# Patient Record
Sex: MALE | Race: WHITE | NOT HISPANIC OR LATINO | Employment: STUDENT | URBAN - METROPOLITAN AREA
[De-identification: names, ages, dates, MRNs, and addresses within clinical notes are randomized per-mention and may not be internally consistent; named-entity substitution may affect disease eponyms.]

---

## 2018-02-02 ENCOUNTER — OFFICE VISIT (OUTPATIENT)
Dept: URGENT CARE | Facility: CLINIC | Age: 18
End: 2018-02-02
Payer: COMMERCIAL

## 2018-02-02 VITALS
DIASTOLIC BLOOD PRESSURE: 70 MMHG | RESPIRATION RATE: 18 BRPM | HEART RATE: 90 BPM | HEIGHT: 68 IN | WEIGHT: 140 LBS | SYSTOLIC BLOOD PRESSURE: 117 MMHG | OXYGEN SATURATION: 99 % | TEMPERATURE: 99 F | BODY MASS INDEX: 21.22 KG/M2

## 2018-02-02 DIAGNOSIS — R05.9 COUGH: ICD-10-CM

## 2018-02-02 DIAGNOSIS — R52 BODY ACHES: ICD-10-CM

## 2018-02-02 DIAGNOSIS — J10.1 INFLUENZA B: Primary | ICD-10-CM

## 2018-02-02 LAB
CTP QC/QA: YES
CTP QC/QA: YES
FLUAV AG NPH QL: NEGATIVE
FLUBV AG NPH QL: POSITIVE
S PYO RRNA THROAT QL PROBE: NEGATIVE

## 2018-02-02 PROCEDURE — 99203 OFFICE O/P NEW LOW 30 MIN: CPT | Mod: S$GLB,,, | Performed by: FAMILY MEDICINE

## 2018-02-02 PROCEDURE — 3008F BODY MASS INDEX DOCD: CPT | Mod: S$GLB,,, | Performed by: FAMILY MEDICINE

## 2018-02-02 PROCEDURE — 87804 INFLUENZA ASSAY W/OPTIC: CPT | Mod: QW,S$GLB,, | Performed by: FAMILY MEDICINE

## 2018-02-02 PROCEDURE — 87880 STREP A ASSAY W/OPTIC: CPT | Mod: QW,S$GLB,, | Performed by: FAMILY MEDICINE

## 2018-02-02 RX ORDER — CODEINE PHOSPHATE AND GUAIFENESIN 10; 100 MG/5ML; MG/5ML
5 SOLUTION ORAL 3 TIMES DAILY PRN
Qty: 120 ML | Refills: 0 | Status: SHIPPED | OUTPATIENT
Start: 2018-02-02 | End: 2018-02-09

## 2018-02-02 RX ORDER — IBUPROFEN 800 MG/1
800 TABLET ORAL 3 TIMES DAILY
Qty: 30 TABLET | Refills: 0 | Status: SHIPPED | OUTPATIENT
Start: 2018-02-02 | End: 2018-02-12

## 2018-02-02 RX ORDER — OSELTAMIVIR PHOSPHATE 75 MG/1
75 CAPSULE ORAL 2 TIMES DAILY
Qty: 10 CAPSULE | Refills: 0 | Status: SHIPPED | OUTPATIENT
Start: 2018-02-02 | End: 2018-02-07

## 2018-02-02 NOTE — PATIENT INSTRUCTIONS
Influenza (Adult)    Influenza is also called the flu. It is a viral illness that affects the air passages of your lungs. It is different from the common cold. The flu can easily be passed from one to person to another. It may be spread through the air by coughing and sneezing. Or it can be spread by touching the sick person and then touching your own eyes, nose, or mouth.  The flu starts 1 to 3 days after you are exposed to the flu virus. It may last for 1 to 2 weeks but many people feel tired or fatigued for many weeks afterward. You usually dont need to take antibiotics unless you have a complication. This might be an ear or sinus infection or pneumonia.  Symptoms of the flu may be mild or severe. They can include extreme tiredness (wanting to stay in bed all day), chills, fevers, muscle aches, soreness with eye movement, headache, and a dry, hacking cough.  Home care  Follow these guidelines when caring for yourself at home:  · Avoid being around cigarette smoke, whether yours or other peoples.  · Acetaminophen or ibuprofen will help ease your fever, muscle aches, and headache. Dont give aspirin to anyone younger than 18 who has the flu. Aspirin can harm the liver.  · Nausea and loss of appetite are common with the flu. Eat light meals. Drink 6 to 8 glasses of liquids every day. Good choices are water, sport drinks, soft drinks without caffeine, juices, tea, and soup. Extra fluids will also help loosen secretions in your nose and lungs.  · Over-the-counter cold medicines will not make the flu go away faster. But the medicines may help with coughing, sore throat, and congestion in your nose and sinuses. Dont use a decongestant if you have high blood pressure.  · Stay home until your fever has been gone for at least 24 hours without using medicine to reduce fever.  Follow-up care  Follow up with your healthcare provider, or as advised, if you are not getting better over the next week.  If you are age 65 or  older, talk with your provider about getting a pneumococcal vaccine every 5 years. You should also get this vaccine if you have chronic asthma or COPD. All adults should get a flu vaccine every fall. Ask your provider about this.  When to seek medical advice  Call your healthcare provider right away if any of these occur:  · Cough with lots of colored mucus (sputum) or blood in your mucus  · Chest pain, shortness of breath, wheezing, or trouble breathing  · Severe headache, or face, neck, or ear pain  · New rash with fever  · Fever of 100.4°F (38°C) or higher, or as directed by your healthcare provider  · Confusion, behavior change, or seizure  · Severe weakness or dizziness  · You get a new fever or cough after getting better for a few days  Date Last Reviewed: 1/1/2017  © 3349-5491 [employer]. [address] [place] [address], [place] [address]. All rights reserved. This information is not intended as a substitute for professional medical care. Always follow your healthcare professional's instructions.      Follow up with your doctor in a few days as needed.  Return to the urgent care or go to the ER if symptoms get worse.    Francis Mustafa MD

## 2018-02-02 NOTE — PROGRESS NOTES
"Subjective:       Patient ID: Fernie Rice is a 17 y.o. male.    Vitals:  height is 5' 8" (1.727 m) and weight is 63.5 kg (140 lb). His temperature is 98.9 °F (37.2 °C). His blood pressure is 117/70 and his pulse is 90. His respiration is 18 and oxygen saturation is 99%.     Chief Complaint: URI    Pt states symptoms headaches, chills, body aches and cough that have been going on for about a week      URI    This is a new problem. The current episode started in the past 7 days. The problem has been waxing and waning. Associated symptoms include congestion, coughing, ear pain, headaches, sinus pain and a sore throat. Pertinent negatives include no abdominal pain, chest pain, nausea or wheezing.     Review of Systems   Constitution: Positive for chills and fever. Negative for malaise/fatigue.   HENT: Positive for congestion, ear pain, sinus pain and sore throat. Negative for hoarse voice.    Eyes: Negative for discharge and redness.   Cardiovascular: Negative for chest pain, dyspnea on exertion and leg swelling.   Respiratory: Positive for cough and sputum production. Negative for shortness of breath and wheezing.    Musculoskeletal: Negative for myalgias.   Gastrointestinal: Negative for abdominal pain and nausea.   Neurological: Positive for headaches.       Objective:      Physical Exam   Constitutional: He is oriented to person, place, and time. He appears well-developed and well-nourished.   HENT:   Head: Normocephalic and atraumatic.   Right Ear: External ear normal.   Left Ear: External ear normal.   Erythematous pharynx, no exudate, no lesion, uvula midline.   Eyes: EOM are normal. Pupils are equal, round, and reactive to light.   Neck: Normal range of motion. Neck supple. No JVD present. No tracheal deviation present. No thyromegaly present.   Cardiovascular: Normal rate, regular rhythm and normal heart sounds.  Exam reveals no gallop and no friction rub.    No murmur heard.  Pulmonary/Chest: Breath sounds " normal. No respiratory distress. He has no wheezes. He has no rales. He exhibits no tenderness.   Abdominal: Soft. Bowel sounds are normal. He exhibits no distension and no mass. There is no tenderness. There is no rebound and no guarding. No hernia.   Musculoskeletal: Normal range of motion. He exhibits no edema, tenderness or deformity.   Lymphadenopathy:     He has no cervical adenopathy.   Neurological: He is alert and oriented to person, place, and time. He displays normal reflexes. No cranial nerve deficit. He exhibits normal muscle tone. Coordination normal.   Skin: Capillary refill takes less than 2 seconds.   Psychiatric: He has a normal mood and affect. His behavior is normal. Judgment and thought content normal.   Vitals reviewed.      Assessment:       1. Influenza B    2. Body aches    3. Cough        Plan:         Influenza B  -     oseltamivir (TAMIFLU) 75 MG capsule; Take 1 capsule (75 mg total) by mouth 2 (two) times daily.  Dispense: 10 capsule; Refill: 0    Body aches  -     POCT Influenza A/B  -     POCT rapid strep A  -     ibuprofen (ADVIL,MOTRIN) 800 MG tablet; Take 1 tablet (800 mg total) by mouth 3 (three) times daily.  Dispense: 30 tablet; Refill: 0    Cough  -     guaifenesin-codeine 100-10 mg/5 ml (CHERATUSSIN AC)  mg/5 mL syrup; Take 5 mLs by mouth 3 (three) times daily as needed for Cough.  Dispense: 120 mL; Refill: 0      Follow up with your doctor in a few days as needed.  Return to the urgent care or go to the ER if symptoms get worse.    Francis Mustafa MD

## 2018-02-16 ENCOUNTER — OFFICE VISIT (OUTPATIENT)
Dept: URGENT CARE | Facility: CLINIC | Age: 18
End: 2018-02-16
Payer: COMMERCIAL

## 2018-02-16 VITALS
RESPIRATION RATE: 18 BRPM | WEIGHT: 140 LBS | HEIGHT: 68 IN | DIASTOLIC BLOOD PRESSURE: 87 MMHG | HEART RATE: 97 BPM | BODY MASS INDEX: 21.22 KG/M2 | OXYGEN SATURATION: 99 % | SYSTOLIC BLOOD PRESSURE: 120 MMHG | TEMPERATURE: 101 F

## 2018-02-16 DIAGNOSIS — J98.01 BRONCHOSPASM: ICD-10-CM

## 2018-02-16 DIAGNOSIS — R50.9 FEVER, UNSPECIFIED FEVER CAUSE: ICD-10-CM

## 2018-02-16 DIAGNOSIS — J11.1 FLU SYNDROME: Primary | ICD-10-CM

## 2018-02-16 DIAGNOSIS — B30.9 VIRAL CONJUNCTIVITIS OF LEFT EYE: ICD-10-CM

## 2018-02-16 PROCEDURE — 99214 OFFICE O/P EST MOD 30 MIN: CPT | Mod: S$GLB,,, | Performed by: PHYSICIAN ASSISTANT

## 2018-02-16 PROCEDURE — 94640 AIRWAY INHALATION TREATMENT: CPT | Mod: S$GLB,,, | Performed by: FAMILY MEDICINE

## 2018-02-16 PROCEDURE — 3008F BODY MASS INDEX DOCD: CPT | Mod: S$GLB,,, | Performed by: PHYSICIAN ASSISTANT

## 2018-02-16 RX ORDER — OLOPATADINE HYDROCHLORIDE 2 MG/ML
1 SOLUTION/ DROPS OPHTHALMIC DAILY
Qty: 1 BOTTLE | Refills: 0 | Status: SHIPPED | OUTPATIENT
Start: 2018-02-16 | End: 2019-08-15

## 2018-02-16 RX ORDER — IPRATROPIUM BROMIDE 0.5 MG/2.5ML
0.5 SOLUTION RESPIRATORY (INHALATION)
Status: COMPLETED | OUTPATIENT
Start: 2018-02-16 | End: 2018-02-16

## 2018-02-16 RX ORDER — PROMETHAZINE HYDROCHLORIDE AND DEXTROMETHORPHAN HYDROBROMIDE 6.25; 15 MG/5ML; MG/5ML
5 SYRUP ORAL
Qty: 118 ML | Refills: 0 | Status: SHIPPED | OUTPATIENT
Start: 2018-02-16 | End: 2018-02-23

## 2018-02-16 RX ORDER — LEVALBUTEROL INHALATION SOLUTION 1.25 MG/3ML
1.25 SOLUTION RESPIRATORY (INHALATION)
Status: COMPLETED | OUTPATIENT
Start: 2018-02-16 | End: 2018-02-16

## 2018-02-16 RX ORDER — IBUPROFEN 200 MG
600 TABLET ORAL
Status: COMPLETED | OUTPATIENT
Start: 2018-02-16 | End: 2018-02-16

## 2018-02-16 RX ORDER — PREDNISONE 20 MG/1
20 TABLET ORAL DAILY
Qty: 3 TABLET | Refills: 0 | Status: SHIPPED | OUTPATIENT
Start: 2018-02-16 | End: 2018-02-19

## 2018-02-16 RX ORDER — OSELTAMIVIR PHOSPHATE 75 MG/1
75 CAPSULE ORAL 2 TIMES DAILY
Qty: 10 CAPSULE | Refills: 0 | Status: SHIPPED | OUTPATIENT
Start: 2018-02-16 | End: 2018-02-21

## 2018-02-16 RX ADMIN — LEVALBUTEROL INHALATION SOLUTION 1.25 MG: 1.25 SOLUTION RESPIRATORY (INHALATION) at 11:02

## 2018-02-16 RX ADMIN — Medication 600 MG: at 11:02

## 2018-02-16 RX ADMIN — IPRATROPIUM BROMIDE 0.5 MG: 0.5 SOLUTION RESPIRATORY (INHALATION) at 11:02

## 2018-02-16 NOTE — PATIENT INSTRUCTIONS
Please return here or go to the Emergency Department for any concerns or worsening of condition.  If you were prescribed antibiotics, please take them to completion.  If you were prescribed a narcotic medication, do not drive or operate heavy equipment or machinery while taking these medications.  Please follow up with your primary care doctor or specialist as needed.    If you  smoke, please stop smoking.    Symptomatic treatment:    Tylenol every 4 hours  Ibuprofen every 6 hours  salt water gargles  Cold-eeze helps to reduce the duration of sore throat symptoms  Cepachol helps to numb the discomfort  Chloroseptic spray  Nasal saline spray reduces inflammation and dryness  Warm face compresses as often as you can  Vicks vapor rub at night  Flonase OTC or Nasacort OTC  Simple foods like chicken noodle soup help  Mucinex DM or use Coricidin HBP if you have hypertension  Zyrtec/Claritin/Xyzal during the day and Benadryl at night may help if allergy component   Pedialyte helps with dehydration if lacking appetite  Rest as much as you can      Influenza (Adult)    Influenza is also called the flu. It is a viral illness that affects the air passages of your lungs. It is different from the common cold. The flu can easily be passed from one to person to another. It may be spread through the air by coughing and sneezing. Or it can be spread by touching the sick person and then touching your own eyes, nose, or mouth.  The flu starts 1 to 3 days after you are exposed to the flu virus. It may last for 1 to 2 weeks but many people feel tired or fatigued for many weeks afterward. You usually dont need to take antibiotics unless you have a complication. This might be an ear or sinus infection or pneumonia.  Symptoms of the flu may be mild or severe. They can include extreme tiredness (wanting to stay in bed all day), chills, fevers, muscle aches, soreness with eye movement, headache, and a dry, hacking cough.  Home care  Follow  these guidelines when caring for yourself at home:  · Avoid being around cigarette smoke, whether yours or other peoples.  · Acetaminophen or ibuprofen will help ease your fever, muscle aches, and headache. Dont give aspirin to anyone younger than 18 who has the flu. Aspirin can harm the liver.  · Nausea and loss of appetite are common with the flu. Eat light meals. Drink 6 to 8 glasses of liquids every day. Good choices are water, sport drinks, soft drinks without caffeine, juices, tea, and soup. Extra fluids will also help loosen secretions in your nose and lungs.  · Over-the-counter cold medicines will not make the flu go away faster. But the medicines may help with coughing, sore throat, and congestion in your nose and sinuses. Dont use a decongestant if you have high blood pressure.  · Stay home until your fever has been gone for at least 24 hours without using medicine to reduce fever.  Follow-up care  Follow up with your healthcare provider, or as advised, if you are not getting better over the next week.  If you are age 65 or older, talk with your provider about getting a pneumococcal vaccine every 5 years. You should also get this vaccine if you have chronic asthma or COPD. All adults should get a flu vaccine every fall. Ask your provider about this.  When to seek medical advice  Call your healthcare provider right away if any of these occur:  · Cough with lots of colored mucus (sputum) or blood in your mucus  · Chest pain, shortness of breath, wheezing, or trouble breathing  · Severe headache, or face, neck, or ear pain  · New rash with fever  · Fever of 100.4°F (38°C) or higher, or as directed by your healthcare provider  · Confusion, behavior change, or seizure  · Severe weakness or dizziness  · You get a new fever or cough after getting better for a few days  Date Last Reviewed: 1/1/2017  © 8692-3978 [email or web address]. [address] [place] [address], [place] [address]. All rights reserved. This  information is not intended as a substitute for professional medical care. Always follow your healthcare professional's instructions.

## 2018-02-16 NOTE — PROGRESS NOTES
"Subjective:       Patient ID: Fernie Rice is a 17 y.o. male.    Vitals:  height is 5' 8" (1.727 m) and weight is 63.5 kg (140 lb). His oral temperature is 100.7 °F (38.2 °C) (abnormal). His blood pressure is 120/87 and his pulse is 97. His respiration is 18 and oxygen saturation is 99%.     Chief Complaint: Cough    Cough   This is a new problem. The current episode started in the past 7 days. The problem has been gradually improving. The problem occurs every few minutes. The cough is non-productive. Associated symptoms include a fever, myalgias, rhinorrhea, sweats and wheezing. Pertinent negatives include no chest pain, chills, ear congestion, ear pain, eye redness, headaches, heartburn, hemoptysis, nasal congestion, postnasal drip, rash, sore throat, shortness of breath or weight loss. The symptoms are aggravated by lying down. He has tried nothing for the symptoms. The treatment provided no relief. His past medical history is significant for asthma. There is no history of bronchiectasis, bronchitis, COPD, emphysema, environmental allergies or pneumonia.     Review of Systems   Constitution: Positive for fever and malaise/fatigue. Negative for chills and weight loss.   HENT: Positive for congestion and rhinorrhea. Negative for ear pain, hoarse voice, postnasal drip and sore throat.    Eyes: Negative for discharge and redness.   Cardiovascular: Negative for chest pain, dyspnea on exertion and leg swelling.   Respiratory: Positive for cough and wheezing. Negative for hemoptysis, shortness of breath and sputum production.    Skin: Negative for rash.   Musculoskeletal: Positive for myalgias. Negative for muscle cramps.   Gastrointestinal: Negative for abdominal pain, heartburn and nausea.   Neurological: Negative for headaches.   Allergic/Immunologic: Negative for environmental allergies.       Objective:      Physical Exam   Constitutional: He is oriented to person, place, and time. He appears well-developed and " well-nourished. He appears listless. He is cooperative.  Non-toxic appearance. He appears ill. No distress.   HENT:   Head: Normocephalic and atraumatic.   Right Ear: Hearing, external ear and ear canal normal. Tympanic membrane is not erythematous and not bulging.   Left Ear: Hearing, external ear and ear canal normal. Tympanic membrane is not erythematous and not bulging.   Nose: Mucosal edema (mild) and rhinorrhea present. No nasal deformity. No epistaxis. Right sinus exhibits no maxillary sinus tenderness and no frontal sinus tenderness. Left sinus exhibits no maxillary sinus tenderness and no frontal sinus tenderness.   Mouth/Throat: Uvula is midline and mucous membranes are normal. No trismus in the jaw. Normal dentition. No uvula swelling. Posterior oropharyngeal erythema present. No oropharyngeal exudate or posterior oropharyngeal edema. Tonsils are 1+ on the right. Tonsils are 1+ on the left. No tonsillar exudate.   Dull TM bilaterally   Eyes: Conjunctivae and lids are normal. No scleral icterus.   Sclera clear bilat   Neck: Trachea normal, full passive range of motion without pain and phonation normal. Neck supple.   Cardiovascular: Normal rate, regular rhythm, normal heart sounds, intact distal pulses and normal pulses.    Pulmonary/Chest: Effort normal. No respiratory distress. He has no decreased breath sounds. He has wheezes (no wheezes post breathing treatment). He has no rhonchi. He has no rales.   Dry cough on exam   Abdominal: Soft. Normal appearance and bowel sounds are normal. He exhibits no distension. There is no tenderness.   Musculoskeletal: Normal range of motion. He exhibits no edema or deformity.   Neurological: He is oriented to person, place, and time. He appears listless. He exhibits normal muscle tone. Coordination normal.   Skin: Skin is warm, dry and intact. He is not diaphoretic. No pallor.   Psychiatric: He has a normal mood and affect. His speech is normal and behavior is normal.  Judgment and thought content normal. Cognition and memory are normal.   Nursing note and vitals reviewed.      Assessment:       1. Flu syndrome    2. Bronchospasm    3. Viral conjunctivitis of left eye    4. Fever, unspecified fever cause        Plan:         Flu syndrome  -     oseltamivir (TAMIFLU) 75 MG capsule; Take 1 capsule (75 mg total) by mouth 2 (two) times daily.  Dispense: 10 capsule; Refill: 0  -     promethazine-dextromethorphan (PROMETHAZINE-DM) 6.25-15 mg/5 mL Syrp; Take 5 mLs by mouth every 4 to 6 hours as needed.  Dispense: 118 mL; Refill: 0  -     predniSONE (DELTASONE) 20 MG tablet; Take 1 tablet (20 mg total) by mouth once daily.  Dispense: 3 tablet; Refill: 0    Bronchospasm  -     levalbuterol nebulizer solution 1.25 mg; Take 3 mLs (1.25 mg total) by nebulization one time.  -     ipratropium 0.02 % nebulizer solution 0.5 mg; Take 2.5 mLs (0.5 mg total) by nebulization one time.  -     predniSONE (DELTASONE) 20 MG tablet; Take 1 tablet (20 mg total) by mouth once daily.  Dispense: 3 tablet; Refill: 0    Viral conjunctivitis of left eye  -     olopatadine (PATADAY) 0.2 % Drop; Place 1 drop into both eyes once daily.  Dispense: 1 Bottle; Refill: 0    Fever, unspecified fever cause  -     ibuprofen tablet 600 mg; Take 3 tablets (600 mg total) by mouth one time.      Patient Instructions   Please return here or go to the Emergency Department for any concerns or worsening of condition.  If you were prescribed antibiotics, please take them to completion.  If you were prescribed a narcotic medication, do not drive or operate heavy equipment or machinery while taking these medications.  Please follow up with your primary care doctor or specialist as needed.    If you  smoke, please stop smoking.    Symptomatic treatment:    Tylenol every 4 hours  Ibuprofen every 6 hours  salt water gargles  Cold-eeze helps to reduce the duration of sore throat symptoms  Cepachol helps to numb the  discomfort  Chloroseptic spray  Nasal saline spray reduces inflammation and dryness  Warm face compresses as often as you can  Vicks vapor rub at night  Flonase OTC or Nasacort OTC  Simple foods like chicken noodle soup help  Mucinex DM or use Coricidin HBP if you have hypertension  Zyrtec/Claritin/Xyzal during the day and Benadryl at night may help if allergy component   Pedialyte helps with dehydration if lacking appetite  Rest as much as you can      Influenza (Adult)    Influenza is also called the flu. It is a viral illness that affects the air passages of your lungs. It is different from the common cold. The flu can easily be passed from one to person to another. It may be spread through the air by coughing and sneezing. Or it can be spread by touching the sick person and then touching your own eyes, nose, or mouth.  The flu starts 1 to 3 days after you are exposed to the flu virus. It may last for 1 to 2 weeks but many people feel tired or fatigued for many weeks afterward. You usually dont need to take antibiotics unless you have a complication. This might be an ear or sinus infection or pneumonia.  Symptoms of the flu may be mild or severe. They can include extreme tiredness (wanting to stay in bed all day), chills, fevers, muscle aches, soreness with eye movement, headache, and a dry, hacking cough.  Home care  Follow these guidelines when caring for yourself at home:  · Avoid being around cigarette smoke, whether yours or other peoples.  · Acetaminophen or ibuprofen will help ease your fever, muscle aches, and headache. Dont give aspirin to anyone younger than 18 who has the flu. Aspirin can harm the liver.  · Nausea and loss of appetite are common with the flu. Eat light meals. Drink 6 to 8 glasses of liquids every day. Good choices are water, sport drinks, soft drinks without caffeine, juices, tea, and soup. Extra fluids will also help loosen secretions in your nose and lungs.  · Over-the-counter  cold medicines will not make the flu go away faster. But the medicines may help with coughing, sore throat, and congestion in your nose and sinuses. Dont use a decongestant if you have high blood pressure.  · Stay home until your fever has been gone for at least 24 hours without using medicine to reduce fever.  Follow-up care  Follow up with your healthcare provider, or as advised, if you are not getting better over the next week.  If you are age 65 or older, talk with your provider about getting a pneumococcal vaccine every 5 years. You should also get this vaccine if you have chronic asthma or COPD. All adults should get a flu vaccine every fall. Ask your provider about this.  When to seek medical advice  Call your healthcare provider right away if any of these occur:  · Cough with lots of colored mucus (sputum) or blood in your mucus  · Chest pain, shortness of breath, wheezing, or trouble breathing  · Severe headache, or face, neck, or ear pain  · New rash with fever  · Fever of 100.4°F (38°C) or higher, or as directed by your healthcare provider  · Confusion, behavior change, or seizure  · Severe weakness or dizziness  · You get a new fever or cough after getting better for a few days  Date Last Reviewed: 1/1/2017  © 5254-7746 [employer]. [address] [place] [address], [place] [address]. All rights reserved. This information is not intended as a substitute for professional medical care. Always follow your healthcare professional's instructions.

## 2018-05-21 ENCOUNTER — OFFICE VISIT (OUTPATIENT)
Dept: URGENT CARE | Facility: CLINIC | Age: 18
End: 2018-05-21
Payer: COMMERCIAL

## 2018-05-21 VITALS
HEIGHT: 68 IN | DIASTOLIC BLOOD PRESSURE: 69 MMHG | WEIGHT: 148 LBS | TEMPERATURE: 97 F | HEART RATE: 91 BPM | OXYGEN SATURATION: 96 % | BODY MASS INDEX: 22.43 KG/M2 | RESPIRATION RATE: 18 BRPM | SYSTOLIC BLOOD PRESSURE: 126 MMHG

## 2018-05-21 DIAGNOSIS — Z87.09 HISTORY OF ASTHMA: ICD-10-CM

## 2018-05-21 DIAGNOSIS — J02.9 PHARYNGITIS, UNSPECIFIED ETIOLOGY: Primary | ICD-10-CM

## 2018-05-21 DIAGNOSIS — J02.9 SORE THROAT: ICD-10-CM

## 2018-05-21 DIAGNOSIS — R05.9 COUGH: ICD-10-CM

## 2018-05-21 LAB
CTP QC/QA: YES
S PYO RRNA THROAT QL PROBE: NEGATIVE

## 2018-05-21 PROCEDURE — 87880 STREP A ASSAY W/OPTIC: CPT | Mod: QW,S$GLB,, | Performed by: FAMILY MEDICINE

## 2018-05-21 PROCEDURE — 3008F BODY MASS INDEX DOCD: CPT | Mod: CPTII,S$GLB,, | Performed by: FAMILY MEDICINE

## 2018-05-21 PROCEDURE — 96372 THER/PROPH/DIAG INJ SC/IM: CPT | Mod: S$GLB,,, | Performed by: FAMILY MEDICINE

## 2018-05-21 PROCEDURE — 99214 OFFICE O/P EST MOD 30 MIN: CPT | Mod: 25,S$GLB,, | Performed by: FAMILY MEDICINE

## 2018-05-21 RX ORDER — AZITHROMYCIN 250 MG/1
TABLET, FILM COATED ORAL
Qty: 6 TABLET | Refills: 0 | Status: SHIPPED | OUTPATIENT
Start: 2018-05-21 | End: 2019-08-14

## 2018-05-21 RX ORDER — BETAMETHASONE SODIUM PHOSPHATE AND BETAMETHASONE ACETATE 3; 3 MG/ML; MG/ML
9 INJECTION, SUSPENSION INTRA-ARTICULAR; INTRALESIONAL; INTRAMUSCULAR; SOFT TISSUE
Status: COMPLETED | OUTPATIENT
Start: 2018-05-21 | End: 2018-05-21

## 2018-05-21 RX ORDER — ALBUTEROL SULFATE 90 UG/1
2 AEROSOL, METERED RESPIRATORY (INHALATION) EVERY 4 HOURS PRN
Qty: 1 INHALER | Refills: 0 | Status: SHIPPED | OUTPATIENT
Start: 2018-05-21 | End: 2018-06-20

## 2018-05-21 RX ADMIN — BETAMETHASONE SODIUM PHOSPHATE AND BETAMETHASONE ACETATE 9 MG: 3; 3 INJECTION, SUSPENSION INTRA-ARTICULAR; INTRALESIONAL; INTRAMUSCULAR; SOFT TISSUE at 12:05

## 2018-05-21 NOTE — PATIENT INSTRUCTIONS
When You Have a Sore Throat    A sore throat can be painful. There are many reasons why you may have a sore throat. Your healthcare provider will work with you to find the cause of your sore throat. He or she will also find the best treatment for you.  What causes a sore throat?  Sore throats can be caused or worsened by:  · Cold or flu viruses  · Bacteria  · Irritants such as tobacco smoke or air pollution  · Acid reflux  A healthy throat  The tonsils are on the sides of the throat near the base of the tongue. They collect viruses and bacteria and help fight infection. The throat (pharynx) is the passage for air. Mucus from the nasal cavity also moves down the passage.  An inflamed throat  The tonsils and pharynx can become inflamed due to a cold or flu virus. Postnasal drip (excess mucus draining from the nasal cavity) can irritate the throat. It can also make the throat or tonsils more likely to be infected by bacteria. Severe, untreated tonsillitis in children or adults can cause a pocket of pus (abscess) to form near the tonsil.  Your evaluation  A medical evaluation can help find the cause of your sore throat. It can also help your healthcare provider choose the best treatment for you. The evaluation may include a health history, physical exam, and diagnostic tests.  Health history  Your healthcare provider may ask you the following:  · How long has the sore throat lasted and how have you been treating it?  · Do you have any other symptoms, such as body aches, fever, or cough?  · Does your sore throat recur? If so, how often? How many days of school or work have you missed because of a sore throat?  · Do you have trouble eating or swallowing?  · Have you been told that you snore or have other sleep problems?  · Do you have bad breath?  · Do you cough up bad-tasting mucus?  Physical exam  During the exam, your healthcare provider checks your ears, nose, and throat for problems. He or she also checks for  "swelling in the neck, and may listen to your chest.  Possible tests  Other tests your healthcare provider may perform include:  · A throat swab to check for bacteria such as streptococcus (the bacteria that causes strep throat)  · A blood test to check for mononucleosis (a viral infection)  · A chest X-ray to rule out pneumonia, especially if you have a cough  Treating a sore throat  Treatment depends on many factors. What is the likely cause? Is the problem recent? Does it keep coming back? In many cases, the best thing to do is to treat the symptoms, rest, and let the problem heal itself. Antibiotics may help clear up some bacterial infections. For cases of severe or recurring tonsillitis, the tonsils may need to be removed.  Relieving your symptoms  · Dont smoke, and avoid secondhand smoke.  · For children, try throat sprays or Popsicles. Adults and older children may try lozenges.  · Drink warm liquids to soothe the throat and help thin mucus. Avoid alcohol, spicy foods, and acidic drinks such as orange juice. These can irritate the throat.  · Gargle with warm saltwater (1 teaspoon of salt to 8 ounces of warm water).  · Use a humidifier to keep air moist and relieve throat dryness.  · Try over-the-counter pain relievers such as acetaminophen or ibuprofen. Use as directed, and dont exceed the recommended dose. Dont give aspirin to children.   Are antibiotics needed?  If your sore throat is due to a bacterial infection, antibiotics may speed healing and prevent complications. Although group A streptococcus ("strep throat" or GAS) is the major treatable infection for a sore throat, GAS causes only 5% to 15% of sore throats in adults who seek medical care. Most sore throats are caused by cold or flu viruses. And antibiotics dont treat viral illness. In fact, using antibiotics when theyre not needed may produce bacteria that are harder to kill. Your healthcare provider will prescribe antibiotics only if he or " she thinks they are likely to help.  If antibiotics are prescribed  Take the medicine exactly as directed. Be sure to finish your prescription even if youre feeling better. And be sure to ask your healthcare provider or pharmacist what side effects are common and what to do about them.  Is surgery needed?  In some cases, tonsils need to be removed. This is often done as outpatient (same-day) surgery. Your healthcare provider may advise removing the tonsils in cases of:  · Several severe bouts of tonsillitis in a year. Severe episodes include those that lead to missed days of school or work, or that need to be treated with antibiotics.  · Tonsillitis that causes breathing problems during sleep  · Tonsillitis caused by food particles collecting in pouches in the tonsils (cryptic tonsillitis)  Call your healthcare provider if any of the following occur:  · Symptoms worsen, or new symptoms develop.  · Swollen tonsils make breathing difficult.  · The pain is severe enough to keep you from drinking liquids.  · A skin rash, hives, or wheezing develops. Any of these could signal an allergic reaction to antibiotics.  · Symptoms dont improve within a week.  · Symptoms dont improve within 2 to 3 days of starting antibiotics.   Date Last Reviewed: 10/1/2016  © 4136-0739 [employer]. [address] [place] [address], [place] [address]. All rights reserved. This information is not intended as a substitute for professional medical care. Always follow your healthcare professional's instructions.      Follow up with your doctor in a few days as needed.  Return to the urgent care or go to the ER if symptoms get worse.    Francis Mustafa MD

## 2018-05-21 NOTE — LETTER
May 21, 2018      [facility] [place]  [address]  [place] [address]  Phone: [phone]  Fax: [phone]       Patient: Fernie Rice   YOB: 2000  Date of Visit: 05/21/2018    To Whom It May Concern:    Luis Rice  was at [facility] on 05/21/2018. He may return to work/school on 5/22/18 with no restrictions.   He can use albuterol inhaler every 4 hours as needed for difficulty of breathing.  If you have any questions or concerns, or if I can be of further assistance, please do not hesitate to contact me.    Sincerely,    Francis Mustafa MD

## 2018-05-21 NOTE — PROGRESS NOTES
"Subjective:       Patient ID: Fernie Rice is a 17 y.o. male.    Vitals:  height is 5' 8" (1.727 m) and weight is 67.1 kg (148 lb). His temperature is 97 °F (36.1 °C). His blood pressure is 126/69 and his pulse is 91. His respiration is 18 and oxygen saturation is 96%.     Chief Complaint: URI    Pt states cough, throat discomfort, sweating, reports a fever and has been using his nebulizer more ferqutnly       URI    This is a new problem. The current episode started in the past 7 days. The problem has been gradually worsening. Associated symptoms include coughing, sinus pain and a sore throat. Pertinent negatives include no abdominal pain, chest pain, congestion, ear pain, headaches, nausea or wheezing.     Review of Systems   Constitution: Negative for chills, fever and malaise/fatigue.   HENT: Positive for sinus pain and sore throat. Negative for congestion, ear pain and hoarse voice.    Eyes: Negative for discharge and redness.   Cardiovascular: Negative for chest pain, dyspnea on exertion and leg swelling.   Respiratory: Positive for cough. Negative for shortness of breath, sputum production and wheezing.    Musculoskeletal: Negative for myalgias.   Gastrointestinal: Negative for abdominal pain and nausea.   Neurological: Negative for headaches.       Objective:      Physical Exam   Constitutional: He is oriented to person, place, and time. He appears well-developed and well-nourished.   HENT:   Head: Normocephalic and atraumatic.   Right Ear: External ear normal.   Left Ear: External ear normal.   Erythematous pharynx, no lesion, no exudate, uvula midline.   Eyes: EOM are normal. Pupils are equal, round, and reactive to light.   Neck: Normal range of motion. Neck supple. No JVD present. No tracheal deviation present. No thyromegaly present.   Cardiovascular: Normal rate, regular rhythm and normal heart sounds.  Exam reveals no gallop and no friction rub.    No murmur heard.  Pulmonary/Chest: Breath sounds normal. " No respiratory distress. He has no wheezes. He has no rales. He exhibits no tenderness.   Abdominal: Soft. Bowel sounds are normal. He exhibits no distension and no mass. There is no tenderness. There is no rebound and no guarding. No hernia.   Musculoskeletal: Normal range of motion. He exhibits no edema, tenderness or deformity.   Lymphadenopathy:     He has no cervical adenopathy.   Neurological: He is alert and oriented to person, place, and time. He displays normal reflexes. No cranial nerve deficit. He exhibits normal muscle tone. Coordination normal.   Skin: Skin is warm. Capillary refill takes less than 2 seconds. No rash noted. No erythema. No pallor.   Psychiatric: He has a normal mood and affect. His behavior is normal. Judgment and thought content normal.   Vitals reviewed.      Assessment:       1. Pharyngitis, unspecified etiology    2. Sore throat    3. History of asthma        Plan:         Pharyngitis, unspecified etiology  -     betamethasone acetate-betamethasone sodium phosphate injection 9 mg; Inject 1.5 mLs (9 mg total) into the muscle one time.  -     azithromycin (Z-ANANT) 250 MG tablet; Take 2 tablets by mouth x 1 for day 1 Then take 1 tablet by mouth daily for day 2 - 5  Dispense: 6 tablet; Refill: 0    Sore throat  -     POCT rapid strep A    History of asthma  -     albuterol 90 mcg/actuation inhaler; Inhale 2 puffs into the lungs every 4 (four) hours as needed for Wheezing. Rescue  Dispense: 1 Inhaler; Refill: 0      Follow up with your doctor in a few days as needed.  Return to the urgent care or go to the ER if symptoms get worse.    Francis Mustafa MD

## 2018-05-22 RX ORDER — PROMETHAZINE HYDROCHLORIDE AND DEXTROMETHORPHAN HYDROBROMIDE 6.25; 15 MG/5ML; MG/5ML
5 SYRUP ORAL 4 TIMES DAILY PRN
Qty: 140 ML | Refills: 0 | Status: SHIPPED | OUTPATIENT
Start: 2018-05-22 | End: 2018-05-29

## 2018-05-22 RX ORDER — ALBUTEROL SULFATE 0.83 MG/ML
2.5 SOLUTION RESPIRATORY (INHALATION) EVERY 6 HOURS PRN
Qty: 1 BOX | Refills: 0 | Status: SHIPPED | OUTPATIENT
Start: 2018-05-22 | End: 2018-06-21

## 2018-05-22 RX ORDER — METHYLPREDNISOLONE 4 MG/1
TABLET ORAL
Qty: 1 PACKAGE | Refills: 0 | Status: SHIPPED | OUTPATIENT
Start: 2018-05-22 | End: 2018-05-28

## 2019-08-14 ENCOUNTER — OFFICE VISIT (OUTPATIENT)
Dept: URGENT CARE | Facility: CLINIC | Age: 19
End: 2019-08-14
Payer: COMMERCIAL

## 2019-08-14 VITALS
HEIGHT: 68 IN | WEIGHT: 150 LBS | HEART RATE: 100 BPM | SYSTOLIC BLOOD PRESSURE: 121 MMHG | TEMPERATURE: 99 F | BODY MASS INDEX: 22.73 KG/M2 | OXYGEN SATURATION: 96 % | DIASTOLIC BLOOD PRESSURE: 76 MMHG | RESPIRATION RATE: 19 BRPM

## 2019-08-14 DIAGNOSIS — R68.89 FLU-LIKE SYMPTOMS: ICD-10-CM

## 2019-08-14 DIAGNOSIS — R11.2 NAUSEA AND VOMITING, INTRACTABILITY OF VOMITING NOT SPECIFIED, UNSPECIFIED VOMITING TYPE: Primary | ICD-10-CM

## 2019-08-14 DIAGNOSIS — R52 GENERALIZED BODY ACHES: ICD-10-CM

## 2019-08-14 LAB
CTP QC/QA: YES
FLUAV AG NPH QL: NEGATIVE
FLUBV AG NPH QL: NEGATIVE

## 2019-08-14 PROCEDURE — 3008F BODY MASS INDEX DOCD: CPT | Mod: CPTII,S$GLB,, | Performed by: NURSE PRACTITIONER

## 2019-08-14 PROCEDURE — 99214 OFFICE O/P EST MOD 30 MIN: CPT | Mod: S$GLB,,, | Performed by: NURSE PRACTITIONER

## 2019-08-14 PROCEDURE — 87804 POCT INFLUENZA A/B: ICD-10-PCS | Mod: 59,QW,S$GLB, | Performed by: NURSE PRACTITIONER

## 2019-08-14 PROCEDURE — 87804 INFLUENZA ASSAY W/OPTIC: CPT | Mod: QW,S$GLB,, | Performed by: NURSE PRACTITIONER

## 2019-08-14 PROCEDURE — 99214 PR OFFICE/OUTPT VISIT, EST, LEVL IV, 30-39 MIN: ICD-10-PCS | Mod: S$GLB,,, | Performed by: NURSE PRACTITIONER

## 2019-08-14 PROCEDURE — 3008F PR BODY MASS INDEX (BMI) DOCUMENTED: ICD-10-PCS | Mod: CPTII,S$GLB,, | Performed by: NURSE PRACTITIONER

## 2019-08-14 RX ORDER — ONDANSETRON 4 MG/1
4 TABLET, FILM COATED ORAL EVERY 6 HOURS PRN
Qty: 30 TABLET | Refills: 0 | Status: SHIPPED | OUTPATIENT
Start: 2019-08-14 | End: 2019-08-15

## 2019-08-14 RX ORDER — DEXTROAMPHETAMINE SACCHARATE, AMPHETAMINE ASPARTATE, DEXTROAMPHETAMINE SULFATE AND AMPHETAMINE SULFATE 5; 5; 5; 5 MG/1; MG/1; MG/1; MG/1
TABLET ORAL
Refills: 0 | COMMUNITY
Start: 2019-06-11 | End: 2019-12-31

## 2019-08-14 RX ORDER — VILAZODONE HYDROCHLORIDE 40 MG/1
TABLET ORAL
Refills: 3 | COMMUNITY
Start: 2019-06-20 | End: 2019-12-31

## 2019-08-14 NOTE — PATIENT INSTRUCTIONS
PLEASE READ YOUR DISCHARGE INSTRUCTIONS ENTIRELY AS IT CONTAINS IMPORTANT INFORMATION.      Please drink plenty of fluids.    Please get plenty of rest.    Please return here or go to the Emergency Department for any concerns or worsening of condition.       Take tylenol (acetominophen) for fever, chills or body aches every 4 hours. do not exceed 4000 mg/ day.    Take Motrin (Ibuprofen) every 4 hours for fever, chills, pain or inflammation.    Use an antihistmine such as claritin or zyrtec to dry you out. Use pseudoephedrine (behind the counter) to decongest (beware this can raise your blood pressure). Use mucinex (guaifenisin) to break up mucous    Use over the counter flonase: one spray each nostril twice daily OR two sprays each nostril once daily.   If you find this dries your nose out or your nose bleeds, try using over the counter nasal saline a few minutes prior to using the flonase to moisten the lining of your nose.     Please return or see your primary care doctor if you develop new or worsening symptoms.     Please arrange follow up with your primary medical clinic as soon as possible. You must understand that you've received an Urgent Care treatment only and that you may be released before all of your medical problems are known or treated. You, the patient, will arrange for follow up as instructed. If your symptoms worsen or fail to improve you should go to the Emergency Room.  WE CANNOT RULE OUT ALL POSSIBLE CAUSES OF YOUR SYMPTOMS IN THE URGENT CARE SETTING PLEASE GO TO THE ER IF YOU FEELS YOUR CONDITION IS WORSENING OR YOU WOULD LIKE EMERGENT EVALUATION.    Viral Syndrome (Adult)  A viral illness may cause a number of symptoms. The symptoms depend on the part of the body that the virus affects. If it settles in your nose, throat, and lungs, it may cause cough, sore throat, congestion, and sometimes headache. If it settles in your stomach and intestinal tract, it may cause vomiting and diarrhea.  "Sometimes it causes vague symptoms like "aching all over," feeling tired, loss of appetite, or fever.  A viral illness usually lasts 1 to 2 weeks, but sometimes it lasts longer. In some cases, a more serious infection can look like a viral syndrome in the first few days of the illness. You may need another exam and additional tests to know the difference. Watch for the warning signs listed below.  Home care  Follow these guidelines for taking care of yourself at home:  · If symptoms are severe, rest at home for the first 2 to 3 days.  · Stay away from cigarette smoke - both your smoke and the smoke from others.  · You may use over-the-counter acetaminophen or ibuprofen for fever, muscle aching, and headache, unless another medicine was prescribed for this. If you have chronic liver or kidney disease or ever had a stomach ulcer or GI bleeding, talk with your doctor before using these medicines. No one who is younger than 18 and ill with a fever should take aspirin. It may cause severe disease or death.  · Your appetite may be poor, so a light diet is fine. Avoid dehydration by drinking 8 to 12 8-ounce glasses of fluids each day. This may include water; orange juice; lemonade; apple, grape, and cranberry juice; clear fruit drinks; electrolyte replacement and sports drinks; and decaffeinated teas and coffee. If you have been diagnosed with a kidney disease, ask your doctor how much and what types of fluids you should drink to prevent dehydration. If you have kidney disease, drinking too much fluid can cause it build up in the your body and be dangerous to your health.  · Over-the-counter remedies won't shorten the length of the illness but may be helpful for cough, sore throat; and nasal and sinus congestion. Don't use decongestants if you have high blood pressure.  Follow-up care  Follow up with your healthcare provider if you do not improve over the next week.  Call 911  Get emergency medical care if any of the " following occur:  · Convulsion  · Feeling weak, dizzy, or like you are going to faint  · Chest pain, shortness of breath, wheezing, or difficulty breathing  When to seek medical advice  Call your healthcare provider right away if any of these occur:  · Cough with lots of colored sputum (mucus) or blood in your sputum  · Chest pain, shortness of breath, wheezing, or difficulty breathing  · Severe headache; face, neck, or ear pain  · Severe, constant pain in the lower right side of your belly (abdominal)  · Continued vomiting (cant keep liquids down)  · Frequent diarrhea (more than 5 times a day); blood (red or black color) or mucus in diarrhea  · Feeling weak, dizzy, or like you are going to faint  · Extreme thirst  · Fever of 100.4°F (38°C) or higher, or as directed by your healthcare provider  Date Last Reviewed: 9/25/2015  © 7616-1954 [employer]. [address] [place] [address], [place] [address]. All rights reserved. This information is not intended as a substitute for professional medical care. Always follow your healthcare professional's instructions.

## 2019-08-14 NOTE — PROGRESS NOTES
"Subjective:       Patient ID: Fernie Rice is a 18 y.o. male.    Vitals:  height is 5' 8" (1.727 m) and weight is 68 kg (150 lb). His oral temperature is 99 °F (37.2 °C). His blood pressure is 121/76 and his pulse is 100. His respiration is 19 and oxygen saturation is 96%.     Chief Complaint: URI    Patient vomited liquid yesterday. Body aches, headaches 8/10, sweating yesterday since Saturday, no fever, tried tylenol and Advil but mild relieve, no diarrhea    URI    This is a new problem. The current episode started in the past 7 days (4 days). The problem has been gradually worsening. There has been no fever. Associated symptoms include headaches and vomiting. Pertinent negatives include no abdominal pain, chest pain, congestion, coughing, diarrhea, dysuria, ear pain, joint pain, joint swelling, nausea, neck pain, plugged ear sensation, rash, rhinorrhea, sinus pain, sneezing, sore throat, swollen glands or wheezing. Associated symptoms comments: Body aches, sweats. He has tried acetaminophen for the symptoms. The treatment provided mild relief.       Constitution: Positive for chills, sweating, fatigue and generalized weakness. Negative for fever.   HENT: Negative for ear pain, ear discharge, congestion, sinus pain and sore throat.    Neck: Negative for neck pain and painful lymph nodes.   Cardiovascular: Negative for chest pain and leg swelling.   Eyes: Negative for double vision and blurred vision.   Respiratory: Negative for cough, shortness of breath and wheezing.    Gastrointestinal: Positive for vomiting. Negative for abdominal pain, nausea and diarrhea.   Genitourinary: Negative for dysuria, frequency and urgency.   Musculoskeletal: Negative for joint pain, joint swelling, muscle cramps and muscle ache.   Skin: Negative for color change, pale and rash.   Allergic/Immunologic: Negative for seasonal allergies and sneezing.   Neurological: Positive for headaches. Negative for dizziness, history of vertigo, " light-headedness and passing out.   Hematologic/Lymphatic: Negative for swollen lymph nodes, easy bruising/bleeding and history of blood clots. Does not bruise/bleed easily.   Psychiatric/Behavioral: Negative for nervous/anxious, sleep disturbance and depression. The patient is not nervous/anxious.        Results for orders placed or performed in visit on 08/14/19   POCT Influenza A/B   Result Value Ref Range    Rapid Influenza A Ag Negative Negative    Rapid Influenza B Ag Negative Negative     Acceptable Yes        Objective:      Physical Exam   Constitutional: He is oriented to person, place, and time. He appears well-developed and well-nourished. He is cooperative.  Non-toxic appearance. He does not appear ill. No distress.   HENT:   Head: Normocephalic and atraumatic.   Right Ear: Hearing, tympanic membrane, external ear and ear canal normal. No drainage, swelling or tenderness. No middle ear effusion. No decreased hearing is noted.   Left Ear: Hearing, tympanic membrane, external ear and ear canal normal. No drainage, swelling or tenderness.  No middle ear effusion. No decreased hearing is noted.   Nose: Nose normal. No mucosal edema, rhinorrhea, sinus tenderness or nasal deformity. No epistaxis. Right sinus exhibits no maxillary sinus tenderness and no frontal sinus tenderness. Left sinus exhibits no maxillary sinus tenderness and no frontal sinus tenderness.   Mouth/Throat: Uvula is midline, oropharynx is clear and moist and mucous membranes are normal. No trismus in the jaw. Normal dentition. No uvula swelling. No oropharyngeal exudate, posterior oropharyngeal edema or posterior oropharyngeal erythema.   Eyes: Conjunctivae and lids are normal. No scleral icterus.   Sclera clear bilat   Neck: Trachea normal, full passive range of motion without pain and phonation normal. Neck supple.   Cardiovascular: Normal rate, regular rhythm, normal heart sounds, intact distal pulses and normal pulses.    Pulmonary/Chest: Effort normal and breath sounds normal. No respiratory distress.   Abdominal: Soft. Normal appearance and bowel sounds are normal. He exhibits no distension. There is no tenderness.   Musculoskeletal: Normal range of motion. He exhibits no edema or deformity.   Neurological: He is alert and oriented to person, place, and time. He exhibits normal muscle tone. Coordination normal.   Skin: Skin is warm, dry and intact. He is not diaphoretic. No pallor.   Psychiatric: He has a normal mood and affect. His speech is normal and behavior is normal. Judgment and thought content normal. Cognition and memory are normal.   Nursing note and vitals reviewed.      Assessment:       1. Nausea and vomiting, intractability of vomiting not specified, unspecified vomiting type    2. Generalized body aches    3. Flu-like symptoms        Plan:         Nausea and vomiting, intractability of vomiting not specified, unspecified vomiting type  -     ondansetron (ZOFRAN) 4 MG tablet; Take 1 tablet (4 mg total) by mouth every 6 (six) hours as needed for Nausea.  Dispense: 30 tablet; Refill: 0    Generalized body aches  -     POCT Influenza A/B    Flu-like symptoms      Patient Instructions   PLEASE READ YOUR DISCHARGE INSTRUCTIONS ENTIRELY AS IT CONTAINS IMPORTANT INFORMATION.      Please drink plenty of fluids.    Please get plenty of rest.    Please return here or go to the Emergency Department for any concerns or worsening of condition.       Take tylenol (acetominophen) for fever, chills or body aches every 4 hours. do not exceed 4000 mg/ day.    Take Motrin (Ibuprofen) every 4 hours for fever, chills, pain or inflammation.    Use an antihistmine such as claritin or zyrtec to dry you out. Use pseudoephedrine (behind the counter) to decongest (beware this can raise your blood pressure). Use mucinex (guaifenisin) to break up mucous    Use over the counter flonase: one spray each nostril twice daily OR two sprays each  "nostril once daily.   If you find this dries your nose out or your nose bleeds, try using over the counter nasal saline a few minutes prior to using the flonase to moisten the lining of your nose.     Please return or see your primary care doctor if you develop new or worsening symptoms.     Please arrange follow up with your primary medical clinic as soon as possible. You must understand that you've received an Urgent Care treatment only and that you may be released before all of your medical problems are known or treated. You, the patient, will arrange for follow up as instructed. If your symptoms worsen or fail to improve you should go to the Emergency Room.  WE CANNOT RULE OUT ALL POSSIBLE CAUSES OF YOUR SYMPTOMS IN THE URGENT CARE SETTING PLEASE GO TO THE ER IF YOU FEELS YOUR CONDITION IS WORSENING OR YOU WOULD LIKE EMERGENT EVALUATION.    Viral Syndrome (Adult)  A viral illness may cause a number of symptoms. The symptoms depend on the part of the body that the virus affects. If it settles in your nose, throat, and lungs, it may cause cough, sore throat, congestion, and sometimes headache. If it settles in your stomach and intestinal tract, it may cause vomiting and diarrhea. Sometimes it causes vague symptoms like "aching all over," feeling tired, loss of appetite, or fever.  A viral illness usually lasts 1 to 2 weeks, but sometimes it lasts longer. In some cases, a more serious infection can look like a viral syndrome in the first few days of the illness. You may need another exam and additional tests to know the difference. Watch for the warning signs listed below.  Home care  Follow these guidelines for taking care of yourself at home:  · If symptoms are severe, rest at home for the first 2 to 3 days.  · Stay away from cigarette smoke - both your smoke and the smoke from others.  · You may use over-the-counter acetaminophen or ibuprofen for fever, muscle aching, and headache, unless another medicine was " prescribed for this. If you have chronic liver or kidney disease or ever had a stomach ulcer or GI bleeding, talk with your doctor before using these medicines. No one who is younger than 18 and ill with a fever should take aspirin. It may cause severe disease or death.  · Your appetite may be poor, so a light diet is fine. Avoid dehydration by drinking 8 to 12 8-ounce glasses of fluids each day. This may include water; orange juice; lemonade; apple, grape, and cranberry juice; clear fruit drinks; electrolyte replacement and sports drinks; and decaffeinated teas and coffee. If you have been diagnosed with a kidney disease, ask your doctor how much and what types of fluids you should drink to prevent dehydration. If you have kidney disease, drinking too much fluid can cause it build up in the your body and be dangerous to your health.  · Over-the-counter remedies won't shorten the length of the illness but may be helpful for cough, sore throat; and nasal and sinus congestion. Don't use decongestants if you have high blood pressure.  Follow-up care  Follow up with your healthcare provider if you do not improve over the next week.  Call 911  Get emergency medical care if any of the following occur:  · Convulsion  · Feeling weak, dizzy, or like you are going to faint  · Chest pain, shortness of breath, wheezing, or difficulty breathing  When to seek medical advice  Call your healthcare provider right away if any of these occur:  · Cough with lots of colored sputum (mucus) or blood in your sputum  · Chest pain, shortness of breath, wheezing, or difficulty breathing  · Severe headache; face, neck, or ear pain  · Severe, constant pain in the lower right side of your belly (abdominal)  · Continued vomiting (cant keep liquids down)  · Frequent diarrhea (more than 5 times a day); blood (red or black color) or mucus in diarrhea  · Feeling weak, dizzy, or like you are going to faint  · Extreme thirst  · Fever of 100.4°F (38°C)  or higher, or as directed by your healthcare provider  Date Last Reviewed: 9/25/2015  © 6935-8187 [employer] [address] [place] [address], [place] [address]. All rights reserved. This information is not intended as a substitute for professional medical care. Always follow your healthcare professional's instructions.

## 2019-08-15 ENCOUNTER — HOSPITAL ENCOUNTER (INPATIENT)
Facility: HOSPITAL | Age: 19
LOS: 5 days | Discharge: HOME | DRG: 871 | End: 2019-08-20
Attending: EMERGENCY MEDICINE | Admitting: EMERGENCY MEDICINE
Payer: COMMERCIAL

## 2019-08-15 DIAGNOSIS — D84.9 IMMUNOSUPPRESSED STATUS: ICD-10-CM

## 2019-08-15 DIAGNOSIS — A41.9 SEPSIS DUE TO PNEUMONIA: ICD-10-CM

## 2019-08-15 DIAGNOSIS — J18.9 ATYPICAL PNEUMONIA: Primary | ICD-10-CM

## 2019-08-15 DIAGNOSIS — B00.0 ECZEMA HERPETICUM: ICD-10-CM

## 2019-08-15 DIAGNOSIS — R00.0 TACHYCARDIA: ICD-10-CM

## 2019-08-15 DIAGNOSIS — J18.9 SEPSIS DUE TO PNEUMONIA: ICD-10-CM

## 2019-08-15 DIAGNOSIS — B00.59 HSV (HERPES SIMPLEX VIRUS) INFECTION OF EYELID: ICD-10-CM

## 2019-08-15 DIAGNOSIS — R50.9 FEVER: ICD-10-CM

## 2019-08-15 DIAGNOSIS — J45.909 ASTHMA, UNSPECIFIED ASTHMA SEVERITY, UNSPECIFIED WHETHER COMPLICATED, UNSPECIFIED WHETHER PERSISTENT: Chronic | ICD-10-CM

## 2019-08-15 LAB
ALBUMIN SERPL BCP-MCNC: 3.1 G/DL (ref 3.5–5.2)
ALP SERPL-CCNC: 75 U/L (ref 55–135)
ALT SERPL W/O P-5'-P-CCNC: 20 U/L (ref 10–44)
ANION GAP SERPL CALC-SCNC: 13 MMOL/L (ref 8–16)
AST SERPL-CCNC: 13 U/L (ref 10–40)
BACTERIA #/AREA URNS AUTO: ABNORMAL /HPF
BASOPHILS # BLD AUTO: 0.02 K/UL (ref 0–0.2)
BASOPHILS NFR BLD: 0.1 % (ref 0–1.9)
BILIRUB SERPL-MCNC: 1.4 MG/DL (ref 0.1–1)
BILIRUB UR QL STRIP: NEGATIVE
BUN SERPL-MCNC: 8 MG/DL (ref 6–20)
CALCIUM SERPL-MCNC: 9.8 MG/DL (ref 8.7–10.5)
CHLORIDE SERPL-SCNC: 96 MMOL/L (ref 95–110)
CLARITY UR REFRACT.AUTO: CLEAR
CO2 SERPL-SCNC: 26 MMOL/L (ref 23–29)
COLOR UR AUTO: YELLOW
CREAT SERPL-MCNC: 0.8 MG/DL (ref 0.5–1.4)
DIFFERENTIAL METHOD: ABNORMAL
EOSINOPHIL # BLD AUTO: 0.2 K/UL (ref 0–0.5)
EOSINOPHIL NFR BLD: 1 % (ref 0–8)
ERYTHROCYTE [DISTWIDTH] IN BLOOD BY AUTOMATED COUNT: 12.4 % (ref 11.5–14.5)
EST. GFR  (AFRICAN AMERICAN): >60 ML/MIN/1.73 M^2
EST. GFR  (NON AFRICAN AMERICAN): >60 ML/MIN/1.73 M^2
GLUCOSE SERPL-MCNC: 116 MG/DL (ref 70–110)
GLUCOSE UR QL STRIP: NEGATIVE
HCT VFR BLD AUTO: 38.8 % (ref 40–54)
HGB BLD-MCNC: 13.1 G/DL (ref 14–18)
HGB UR QL STRIP: ABNORMAL
HYALINE CASTS UR QL AUTO: 0 /LPF
IMM GRANULOCYTES # BLD AUTO: 0.09 K/UL (ref 0–0.04)
IMM GRANULOCYTES NFR BLD AUTO: 0.5 % (ref 0–0.5)
KETONES UR QL STRIP: ABNORMAL
LEUKOCYTE ESTERASE UR QL STRIP: NEGATIVE
LYMPHOCYTES # BLD AUTO: 0.6 K/UL (ref 1–4.8)
LYMPHOCYTES NFR BLD: 3.3 % (ref 18–48)
MCH RBC QN AUTO: 29.2 PG (ref 27–31)
MCHC RBC AUTO-ENTMCNC: 33.8 G/DL (ref 32–36)
MCV RBC AUTO: 87 FL (ref 82–98)
MICROSCOPIC COMMENT: ABNORMAL
MONOCYTES # BLD AUTO: 0.3 K/UL (ref 0.3–1)
MONOCYTES NFR BLD: 1.6 % (ref 4–15)
NEUTROPHILS # BLD AUTO: 15.9 K/UL (ref 1.8–7.7)
NEUTROPHILS NFR BLD: 93.5 % (ref 38–73)
NITRITE UR QL STRIP: NEGATIVE
NRBC BLD-RTO: 0 /100 WBC
PH UR STRIP: 7 [PH] (ref 5–8)
PLATELET # BLD AUTO: 374 K/UL (ref 150–350)
PMV BLD AUTO: 9.6 FL (ref 9.2–12.9)
POTASSIUM SERPL-SCNC: 3.7 MMOL/L (ref 3.5–5.1)
PROT SERPL-MCNC: 8.4 G/DL (ref 6–8.4)
PROT UR QL STRIP: ABNORMAL
RBC # BLD AUTO: 4.48 M/UL (ref 4.6–6.2)
RBC #/AREA URNS AUTO: 11 /HPF (ref 0–4)
SODIUM SERPL-SCNC: 135 MMOL/L (ref 136–145)
SP GR UR STRIP: 1.01 (ref 1–1.03)
URN SPEC COLLECT METH UR: ABNORMAL
WBC # BLD AUTO: 17.06 K/UL (ref 3.9–12.7)
WBC #/AREA URNS AUTO: 3 /HPF (ref 0–5)

## 2019-08-15 PROCEDURE — 99285 PR EMERGENCY DEPT VISIT,LEVEL V: ICD-10-PCS | Mod: ,,, | Performed by: PHYSICIAN ASSISTANT

## 2019-08-15 PROCEDURE — 11000001 HC ACUTE MED/SURG PRIVATE ROOM

## 2019-08-15 PROCEDURE — 85025 COMPLETE CBC W/AUTO DIFF WBC: CPT

## 2019-08-15 PROCEDURE — 81001 URINALYSIS AUTO W/SCOPE: CPT

## 2019-08-15 PROCEDURE — 96375 TX/PRO/DX INJ NEW DRUG ADDON: CPT

## 2019-08-15 PROCEDURE — 99285 EMERGENCY DEPT VISIT HI MDM: CPT | Mod: 25

## 2019-08-15 PROCEDURE — 87040 BLOOD CULTURE FOR BACTERIA: CPT

## 2019-08-15 PROCEDURE — 63600175 PHARM REV CODE 636 W HCPCS: Performed by: PHYSICIAN ASSISTANT

## 2019-08-15 PROCEDURE — 93010 EKG 12-LEAD: ICD-10-PCS | Mod: ,,, | Performed by: INTERNAL MEDICINE

## 2019-08-15 PROCEDURE — 93005 ELECTROCARDIOGRAM TRACING: CPT

## 2019-08-15 PROCEDURE — 80053 COMPREHEN METABOLIC PANEL: CPT

## 2019-08-15 PROCEDURE — 96374 THER/PROPH/DIAG INJ IV PUSH: CPT

## 2019-08-15 PROCEDURE — 96361 HYDRATE IV INFUSION ADD-ON: CPT

## 2019-08-15 PROCEDURE — 25000003 PHARM REV CODE 250: Performed by: PHYSICIAN ASSISTANT

## 2019-08-15 PROCEDURE — 93010 ELECTROCARDIOGRAM REPORT: CPT | Mod: ,,, | Performed by: INTERNAL MEDICINE

## 2019-08-15 PROCEDURE — 99285 EMERGENCY DEPT VISIT HI MDM: CPT | Mod: ,,, | Performed by: PHYSICIAN ASSISTANT

## 2019-08-15 RX ORDER — AZITHROMYCIN 250 MG/1
500 TABLET, FILM COATED ORAL
Status: COMPLETED | OUTPATIENT
Start: 2019-08-15 | End: 2019-08-15

## 2019-08-15 RX ORDER — METHOCARBAMOL 500 MG/1
500 TABLET, FILM COATED ORAL
Status: COMPLETED | OUTPATIENT
Start: 2019-08-15 | End: 2019-08-15

## 2019-08-15 RX ORDER — RAMELTEON 8 MG/1
8 TABLET ORAL NIGHTLY PRN
Status: DISCONTINUED | OUTPATIENT
Start: 2019-08-16 | End: 2019-08-20 | Stop reason: HOSPADM

## 2019-08-15 RX ORDER — CEFTRIAXONE 1 G/1
1 INJECTION, POWDER, FOR SOLUTION INTRAMUSCULAR; INTRAVENOUS
Status: COMPLETED | OUTPATIENT
Start: 2019-08-15 | End: 2019-08-15

## 2019-08-15 RX ORDER — ALBUTEROL SULFATE 90 UG/1
1 AEROSOL, METERED RESPIRATORY (INHALATION) EVERY 4 HOURS PRN
Status: DISCONTINUED | OUTPATIENT
Start: 2019-08-16 | End: 2019-08-17

## 2019-08-15 RX ORDER — ACETAMINOPHEN 500 MG
1000 TABLET ORAL
Status: COMPLETED | OUTPATIENT
Start: 2019-08-15 | End: 2019-08-15

## 2019-08-15 RX ORDER — CETIRIZINE HYDROCHLORIDE 10 MG/1
10 TABLET ORAL DAILY
Status: DISCONTINUED | OUTPATIENT
Start: 2019-08-16 | End: 2019-08-20 | Stop reason: HOSPADM

## 2019-08-15 RX ORDER — IBUPROFEN 600 MG/1
600 TABLET ORAL
Status: COMPLETED | OUTPATIENT
Start: 2019-08-15 | End: 2019-08-15

## 2019-08-15 RX ORDER — ONDANSETRON 2 MG/ML
4 INJECTION INTRAMUSCULAR; INTRAVENOUS
Status: COMPLETED | OUTPATIENT
Start: 2019-08-15 | End: 2019-08-15

## 2019-08-15 RX ORDER — ACETAMINOPHEN 325 MG/1
650 TABLET ORAL EVERY 8 HOURS PRN
Status: DISCONTINUED | OUTPATIENT
Start: 2019-08-16 | End: 2019-08-20 | Stop reason: HOSPADM

## 2019-08-15 RX ORDER — SODIUM CHLORIDE 0.9 % (FLUSH) 0.9 %
10 SYRINGE (ML) INJECTION
Status: CANCELLED | OUTPATIENT
Start: 2019-08-15

## 2019-08-15 RX ORDER — ONDANSETRON 4 MG/1
4 TABLET, ORALLY DISINTEGRATING ORAL EVERY 8 HOURS PRN
Status: DISCONTINUED | OUTPATIENT
Start: 2019-08-16 | End: 2019-08-20 | Stop reason: HOSPADM

## 2019-08-15 RX ORDER — HYDROCODONE BITARTRATE AND ACETAMINOPHEN 10; 325 MG/1; MG/1
1 TABLET ORAL EVERY 6 HOURS PRN
Status: DISCONTINUED | OUTPATIENT
Start: 2019-08-16 | End: 2019-08-20 | Stop reason: HOSPADM

## 2019-08-15 RX ORDER — SODIUM CHLORIDE 0.9 % (FLUSH) 0.9 %
10 SYRINGE (ML) INJECTION
Status: DISCONTINUED | OUTPATIENT
Start: 2019-08-16 | End: 2019-08-20 | Stop reason: HOSPADM

## 2019-08-15 RX ORDER — VILAZODONE HYDROCHLORIDE 10 MG/1
40 TABLET ORAL DAILY
Status: DISCONTINUED | OUTPATIENT
Start: 2019-08-16 | End: 2019-08-20 | Stop reason: HOSPADM

## 2019-08-15 RX ORDER — AZITHROMYCIN 250 MG/1
500 TABLET, FILM COATED ORAL DAILY
Status: DISCONTINUED | OUTPATIENT
Start: 2019-08-16 | End: 2019-08-17

## 2019-08-15 RX ORDER — KETOROLAC TROMETHAMINE 30 MG/ML
15 INJECTION, SOLUTION INTRAMUSCULAR; INTRAVENOUS
Status: COMPLETED | OUTPATIENT
Start: 2019-08-15 | End: 2019-08-15

## 2019-08-15 RX ORDER — HYDROCODONE BITARTRATE AND ACETAMINOPHEN 5; 325 MG/1; MG/1
1 TABLET ORAL EVERY 6 HOURS PRN
Status: DISCONTINUED | OUTPATIENT
Start: 2019-08-16 | End: 2019-08-20 | Stop reason: HOSPADM

## 2019-08-15 RX ORDER — CEFTRIAXONE 1 G/1
1 INJECTION, POWDER, FOR SOLUTION INTRAMUSCULAR; INTRAVENOUS
Status: DISCONTINUED | OUTPATIENT
Start: 2019-08-16 | End: 2019-08-17

## 2019-08-15 RX ADMIN — AZITHROMYCIN 500 MG: 250 TABLET, FILM COATED ORAL at 08:08

## 2019-08-15 RX ADMIN — ONDANSETRON 4 MG: 2 INJECTION INTRAMUSCULAR; INTRAVENOUS at 05:08

## 2019-08-15 RX ADMIN — ACETAMINOPHEN 1000 MG: 500 TABLET ORAL at 05:08

## 2019-08-15 RX ADMIN — KETOROLAC TROMETHAMINE 15 MG: 30 INJECTION, SOLUTION INTRAMUSCULAR at 05:08

## 2019-08-15 RX ADMIN — IBUPROFEN 600 MG: 600 TABLET ORAL at 08:08

## 2019-08-15 RX ADMIN — METHOCARBAMOL TABLETS 500 MG: 500 TABLET, COATED ORAL at 08:08

## 2019-08-15 RX ADMIN — SODIUM CHLORIDE 1000 ML: 0.9 INJECTION, SOLUTION INTRAVENOUS at 08:08

## 2019-08-15 RX ADMIN — CEFTRIAXONE SODIUM 1 G: 1 INJECTION, POWDER, FOR SOLUTION INTRAMUSCULAR; INTRAVENOUS at 08:08

## 2019-08-15 RX ADMIN — SODIUM CHLORIDE 1000 ML: 0.9 INJECTION, SOLUTION INTRAVENOUS at 05:08

## 2019-08-15 NOTE — ED TRIAGE NOTES
"To the ED with c/o "feeling like my body is shutting down"  Symptoms started Saturday, chills, fever, body aches, nausea, vomiting, anorexia. Head hurts.  Urgent care yesterday, tested for flu, negative.   "

## 2019-08-15 NOTE — ED PROVIDER NOTES
Encounter Date: 8/15/2019       History     Chief Complaint   Patient presents with    Generalized Body Aches     body aches with head and vomting since saturday. Seen at , not flu. Denies fevers at home.      18 year old male with medical history of Asthma, Esophagitis, Atopic Dermatitis (on Dupixent injections) presenting to the ED with the chief complaint of generalized body aches and fever. Patient reports developing generalized body aches 6 days ago while driving in his car. He reports associated intermittent occipital headaches and dry cough. He reports developing nausea and non-bloody vomiting over the past 2 days (4 episodes total). He denies vision changes, photophobia, sore throat, sinus congestion, SOB, chest pain, diarrhea, constipation, dysuria, hematuria, penile discharge, testicular pain, concerns for STD. He denies having any rashes currently. No history of IVDU. He is a daily marijuana smoker.         Review of patient's allergies indicates:   Allergen Reactions    Fish containing products Other (See Comments)    Peanut Anaphylaxis    Amoxicillin Hives    Bactrim [sulfamethoxazole-trimethoprim] Hives    Corn containing products Other (See Comments)     Past Medical History:   Diagnosis Date    ADHD (attention deficit hyperactivity disorder)     Asthma     Esophagitis     Food impaction of esophagus     Multiple food allergies     Vernal keratoconjunctivitis      Past Surgical History:   Procedure Laterality Date    ADENOIDECTOMY      ESOPHAGOGASTRODUODENOSCOPY (EGD) N/A 11/22/2014    Performed by Aman Hayes MD at [facility] OR [address] [place] [address]     History reviewed. No pertinent family history.  Social History     Tobacco Use    Smoking status: Current Some Day Smoker    Smokeless tobacco: Never Used   Substance Use Topics    Alcohol use: No    Drug use: Yes     Types: Marijuana     Review of Systems   Constitutional: Positive for fever.   HENT: Negative for congestion, sore throat and  trouble swallowing.    Eyes: Negative for pain and redness.   Respiratory: Positive for cough. Negative for shortness of breath.    Cardiovascular: Negative for chest pain.   Gastrointestinal: Positive for nausea and vomiting. Negative for constipation and diarrhea.   Genitourinary: Negative for dysuria, hematuria, penile pain, penile swelling and testicular pain.   Musculoskeletal: Positive for arthralgias and myalgias.   Skin: Negative for rash and wound.   Allergic/Immunologic: Positive for immunocompromised state.   Neurological: Positive for weakness (generalized) and headaches.     Physical Exam     Initial Vitals [08/15/19 1446]   BP Pulse Resp Temp SpO2   136/81 (!) 112 18 (!) 102 °F (38.9 °C) 95 %      MAP       --         Physical Exam    Constitutional: He appears well-developed and well-nourished. He is not diaphoretic. No distress.   HENT:   Head: Normocephalic and atraumatic.   Mouth/Throat: Oropharynx is clear and moist. No oropharyngeal exudate.   Clear oropharynx. No erythema, tonsillar edema or exudate   Eyes: EOM are normal. Pupils are equal, round, and reactive to light.   Neck: Normal range of motion. Neck supple.   No nuchal rigidity   Cardiovascular: Normal rate, regular rhythm and intact distal pulses.   Pulmonary/Chest: Breath sounds normal. No respiratory distress. He has no wheezes.   Abdominal: Soft. He exhibits no distension. There is no tenderness.   No CVA tenderness   Musculoskeletal: Normal range of motion. He exhibits no edema or tenderness.   No midline spinal tenderness   Neurological: He is alert and oriented to person, place, and time. He has normal strength. No cranial nerve deficit or sensory deficit.   Negative Kernig's and Brudzinski's sign   Skin: Skin is warm and dry. No erythema.       ED Course   Procedures  Labs Reviewed   CBC W/ AUTO DIFFERENTIAL - Abnormal; Notable for the following components:       Result Value    WBC 17.06 (*)     RBC 4.48 (*)     Hemoglobin 13.1  (*)     Hematocrit 38.8 (*)     Platelets 374 (*)     Gran # (ANC) 15.9 (*)     Immature Grans (Abs) 0.09 (*)     Lymph # 0.6 (*)     Gran% 93.5 (*)     Lymph% 3.3 (*)     Mono% 1.6 (*)     All other components within normal limits   COMPREHENSIVE METABOLIC PANEL - Abnormal; Notable for the following components:    Sodium 135 (*)     Glucose 116 (*)     Albumin 3.1 (*)     Total Bilirubin 1.4 (*)     All other components within normal limits   URINALYSIS, REFLEX TO URINE CULTURE - Abnormal; Notable for the following components:    Protein, UA 2+ (*)     Ketones, UA 1+ (*)     Occult Blood UA 2+ (*)     All other components within normal limits    Narrative:     Preferred Collection Type->Urine, Clean Catch   URINALYSIS MICROSCOPIC - Abnormal; Notable for the following components:    RBC, UA 11 (*)     All other components within normal limits    Narrative:     Preferred Collection Type->Urine, Clean Catch   CULTURE, BLOOD   CULTURE, BLOOD          Imaging Results          X-Ray Chest PA And Lateral (Final result)  Result time 08/15/19 18:34:11    Final result by Zia Chávez MD (08/15/19 18:34:11)                 Impression:      Findings of atypical pneumonia.      Electronically signed by: Zia Chávez  Date:    08/15/2019  Time:    18:34             Narrative:    EXAMINATION:  XR CHEST PA AND LATERAL    CLINICAL HISTORY:  Fever, unspecified    TECHNIQUE:  PA and lateral views of the chest were performed.    COMPARISON:  11/22/2014 chest    FINDINGS:  Frontal and lateral views presented.    The heart and hilar shadows and trachea appear normal.  No pneumothorax or pleural effusion.  Visualized spine and bowel gas pattern appears normal.    There is new irregular patchy and streaky opacities in the mid and inferior lung.  This likely represents atypical pneumonia.  No discrete lobar consolidation seen.  No nodule or cavitary focus.    Left cervical rib.                                 Medical Decision Making:    History:   Old Medical Records: I decided to obtain old medical records.  Old Records Summarized: records from clinic visits.  Independently Interpreted Test(s):   I have ordered and independently interpreted EKG Reading(s) - see summary below       <> Summary of EKG Reading(s):  bpm. No STEMI  Clinical Tests:   Lab Tests: Ordered and Reviewed  Radiological Study: Ordered and Reviewed  Medical Tests: Ordered and Reviewed  Other:   I have discussed this case with another health care provider.  Hospital Medicine       APC / Resident Notes:   18 year old male with medical history of Asthma, Esophagitis, Atopic Dermatitis (on Dupixent injections) presenting to the ED c/o generalized body aches, fever, N/V for 6 days. DDx includes but not limited to viral syndrome, pharyngitis, pneumonia, UTI, GERD, cholecystitis, symptomatic cholelithiasis, rheumatic fever, hypersensitivity reaction, rheumatoid arthritis. I have considered but do not suspect meningitis. Will check labs, UA, CXR. Will give fluids, anti-emetics, analgesics as needed.     Work-up shows WBC 17, H/H 13/38, Plt 374, Crt 0.8, K 3.7, Tbil elevated 1.4, AST/ALT 13/20, AG 13  CXR shows finding of atypical pneumonia    Will treat for CAP with Azithromycin and Rocephin. Patient is immunosuppressed. Will admit to medicine for ongoing management. Patient expresses understanding and agreeable to the plan. Return to ED precautions given for new, worsening, or concerning symptoms. I have discussed the care of this patient with my supervising physician.                  Clinical Impression:       ICD-10-CM ICD-9-CM   1. Atypical pneumonia J18.9 486   2. Fever R50.9 780.60   3. Tachycardia R00.0 785.0   4. Immunosuppressed status D89.9 279.9         Disposition:   Disposition: Admitted  Condition: Serious                             Nelson Edwards PA-C  08/16/19 0051

## 2019-08-15 NOTE — ED NOTES
LOC: The patient is awake, alert, and oriented to place, time, situation. Affect is appropriate.  Speech is appropriate and clear.     APPEARANCE: Patient resting uncomfortably, in no acute distress.  Patient is clean and well groomed.    SKIN: The skin is warm and dry; color consistent with ethnicity.  Patient has normal skin turgor and dry  mucus membranes.  Skin intact; no breakdown or bruising noted. Reporting body aches.     MUSCULOSKELETAL: Patient moving upper and lower extremities without difficulty.  Denies weakness.     RESPIRATORY: Airway is open and patent. Respirations spontaneous, and non-labored.  Patient has a normal effort and rate.  No accessory muscle use noted. Denies cough.     CARDIAC:  No peripheral edema noted. No complaints of chest pain.      ABDOMEN: Soft and non  to palpation.  No distention noted.     NEUROLOGIC: Eyes open spontaneously.  Reporting headache and black spot in vision.  Behavior appropriate to situation.  Follows commands; facial expression symmetrical.  Purposeful motor response noted; normal sensation in all extremities.

## 2019-08-16 PROBLEM — J18.9 SEPSIS DUE TO PNEUMONIA: Status: ACTIVE | Noted: 2019-08-16

## 2019-08-16 PROBLEM — A41.9 SEPSIS DUE TO PNEUMONIA: Status: ACTIVE | Noted: 2019-08-16

## 2019-08-16 PROBLEM — M54.6 ACUTE MIDLINE THORACIC BACK PAIN: Status: ACTIVE | Noted: 2019-08-16

## 2019-08-16 PROBLEM — M54.9 BACK PAIN: Status: ACTIVE | Noted: 2019-08-16

## 2019-08-16 LAB
ALBUMIN SERPL BCP-MCNC: 2.7 G/DL (ref 3.5–5.2)
ALLENS TEST: ABNORMAL
ALP SERPL-CCNC: 95 U/L (ref 55–135)
ALT SERPL W/O P-5'-P-CCNC: 24 U/L (ref 10–44)
ANION GAP SERPL CALC-SCNC: 12 MMOL/L (ref 8–16)
AST SERPL-CCNC: 22 U/L (ref 10–40)
BASOPHILS # BLD AUTO: 0.02 K/UL (ref 0–0.2)
BASOPHILS NFR BLD: 0.1 % (ref 0–1.9)
BILIRUB SERPL-MCNC: 0.9 MG/DL (ref 0.1–1)
BUN SERPL-MCNC: 11 MG/DL (ref 6–20)
CALCIUM SERPL-MCNC: 9.5 MG/DL (ref 8.7–10.5)
CHLORIDE SERPL-SCNC: 102 MMOL/L (ref 95–110)
CO2 SERPL-SCNC: 25 MMOL/L (ref 23–29)
CREAT SERPL-MCNC: 0.7 MG/DL (ref 0.5–1.4)
DELSYS: ABNORMAL
DIFFERENTIAL METHOD: ABNORMAL
EOSINOPHIL # BLD AUTO: 0.5 K/UL (ref 0–0.5)
EOSINOPHIL NFR BLD: 3.7 % (ref 0–8)
ERYTHROCYTE [DISTWIDTH] IN BLOOD BY AUTOMATED COUNT: 12.5 % (ref 11.5–14.5)
EST. GFR  (AFRICAN AMERICAN): >60 ML/MIN/1.73 M^2
EST. GFR  (NON AFRICAN AMERICAN): >60 ML/MIN/1.73 M^2
GLUCOSE SERPL-MCNC: 104 MG/DL (ref 70–110)
HCO3 UR-SCNC: 26.2 MMOL/L (ref 24–28)
HCT VFR BLD AUTO: 40 % (ref 40–54)
HGB BLD-MCNC: 12.7 G/DL (ref 14–18)
IMM GRANULOCYTES # BLD AUTO: 0.08 K/UL (ref 0–0.04)
IMM GRANULOCYTES NFR BLD AUTO: 0.6 % (ref 0–0.5)
LACTATE SERPL-SCNC: 0.7 MMOL/L (ref 0.5–2.2)
LYMPHOCYTES # BLD AUTO: 0.7 K/UL (ref 1–4.8)
LYMPHOCYTES NFR BLD: 5.1 % (ref 18–48)
MAGNESIUM SERPL-MCNC: 1.7 MG/DL (ref 1.6–2.6)
MCH RBC QN AUTO: 28.7 PG (ref 27–31)
MCHC RBC AUTO-ENTMCNC: 31.8 G/DL (ref 32–36)
MCV RBC AUTO: 91 FL (ref 82–98)
MODE: ABNORMAL
MONOCYTES # BLD AUTO: 0.1 K/UL (ref 0.3–1)
MONOCYTES NFR BLD: 1 % (ref 4–15)
NEUTROPHILS # BLD AUTO: 12.7 K/UL (ref 1.8–7.7)
NEUTROPHILS NFR BLD: 89.5 % (ref 38–73)
NRBC BLD-RTO: 0 /100 WBC
PCO2 BLDA: 42.3 MMHG (ref 35–45)
PH SMN: 7.4 [PH] (ref 7.35–7.45)
PHOSPHATE SERPL-MCNC: 2.1 MG/DL (ref 2.7–4.5)
PLATELET # BLD AUTO: 342 K/UL (ref 150–350)
PMV BLD AUTO: 9.4 FL (ref 9.2–12.9)
PO2 BLDA: 113 MMHG (ref 80–100)
POC BE: 1 MMOL/L
POC SATURATED O2: 98 % (ref 95–100)
POC TCO2: 27 MMOL/L (ref 23–27)
POTASSIUM SERPL-SCNC: 4 MMOL/L (ref 3.5–5.1)
PROT SERPL-MCNC: 7.4 G/DL (ref 6–8.4)
RBC # BLD AUTO: 4.42 M/UL (ref 4.6–6.2)
SAMPLE: ABNORMAL
SITE: ABNORMAL
SODIUM SERPL-SCNC: 139 MMOL/L (ref 136–145)
WBC # BLD AUTO: 14.2 K/UL (ref 3.9–12.7)

## 2019-08-16 PROCEDURE — 99222 1ST HOSP IP/OBS MODERATE 55: CPT | Mod: ,,, | Performed by: HOSPITALIST

## 2019-08-16 PROCEDURE — 80053 COMPREHEN METABOLIC PANEL: CPT

## 2019-08-16 PROCEDURE — 85025 COMPLETE CBC W/AUTO DIFF WBC: CPT

## 2019-08-16 PROCEDURE — 83605 ASSAY OF LACTIC ACID: CPT

## 2019-08-16 PROCEDURE — 25000242 PHARM REV CODE 250 ALT 637 W/ HCPCS: Performed by: STUDENT IN AN ORGANIZED HEALTH CARE EDUCATION/TRAINING PROGRAM

## 2019-08-16 PROCEDURE — 99900035 HC TECH TIME PER 15 MIN (STAT)

## 2019-08-16 PROCEDURE — 99222 PR INITIAL HOSPITAL CARE,LEVL II: ICD-10-PCS | Mod: ,,, | Performed by: HOSPITALIST

## 2019-08-16 PROCEDURE — 36600 WITHDRAWAL OF ARTERIAL BLOOD: CPT

## 2019-08-16 PROCEDURE — 83735 ASSAY OF MAGNESIUM: CPT

## 2019-08-16 PROCEDURE — 82803 BLOOD GASES ANY COMBINATION: CPT

## 2019-08-16 PROCEDURE — 25000003 PHARM REV CODE 250: Performed by: STUDENT IN AN ORGANIZED HEALTH CARE EDUCATION/TRAINING PROGRAM

## 2019-08-16 PROCEDURE — 87632 RESP VIRUS 6-11 TARGETS: CPT

## 2019-08-16 PROCEDURE — 94761 N-INVAS EAR/PLS OXIMETRY MLT: CPT

## 2019-08-16 PROCEDURE — 36415 COLL VENOUS BLD VENIPUNCTURE: CPT

## 2019-08-16 PROCEDURE — 63600175 PHARM REV CODE 636 W HCPCS: Performed by: STUDENT IN AN ORGANIZED HEALTH CARE EDUCATION/TRAINING PROGRAM

## 2019-08-16 PROCEDURE — 84100 ASSAY OF PHOSPHORUS: CPT

## 2019-08-16 PROCEDURE — 11000001 HC ACUTE MED/SURG PRIVATE ROOM

## 2019-08-16 RX ORDER — HYDROCODONE BITARTRATE AND ACETAMINOPHEN 10; 325 MG/1; MG/1
1 TABLET ORAL ONCE
Status: COMPLETED | OUTPATIENT
Start: 2019-08-16 | End: 2019-08-16

## 2019-08-16 RX ORDER — SCOLOPAMINE TRANSDERMAL SYSTEM 1 MG/1
1 PATCH, EXTENDED RELEASE TRANSDERMAL
Status: DISCONTINUED | OUTPATIENT
Start: 2019-08-16 | End: 2019-08-20 | Stop reason: HOSPADM

## 2019-08-16 RX ORDER — SODIUM CHLORIDE 9 MG/ML
INJECTION, SOLUTION INTRAVENOUS CONTINUOUS
Status: DISCONTINUED | OUTPATIENT
Start: 2019-08-16 | End: 2019-08-16

## 2019-08-16 RX ORDER — KETOROLAC TROMETHAMINE 30 MG/ML
15 INJECTION, SOLUTION INTRAMUSCULAR; INTRAVENOUS ONCE AS NEEDED
Status: COMPLETED | OUTPATIENT
Start: 2019-08-17 | End: 2019-08-16

## 2019-08-16 RX ORDER — ONDANSETRON 4 MG/1
4 TABLET, ORALLY DISINTEGRATING ORAL ONCE AS NEEDED
Status: COMPLETED | OUTPATIENT
Start: 2019-08-16 | End: 2019-08-17

## 2019-08-16 RX ORDER — POTASSIUM CHLORIDE 20 MEQ/1
40 TABLET, EXTENDED RELEASE ORAL ONCE
Status: COMPLETED | OUTPATIENT
Start: 2019-08-16 | End: 2019-08-16

## 2019-08-16 RX ORDER — ONDANSETRON 4 MG/1
4 TABLET, ORALLY DISINTEGRATING ORAL ONCE
Status: DISCONTINUED | OUTPATIENT
Start: 2019-08-17 | End: 2019-08-16

## 2019-08-16 RX ORDER — KETOROLAC TROMETHAMINE 30 MG/ML
15 INJECTION, SOLUTION INTRAMUSCULAR; INTRAVENOUS ONCE
Status: DISCONTINUED | OUTPATIENT
Start: 2019-08-17 | End: 2019-08-16

## 2019-08-16 RX ADMIN — SODIUM CHLORIDE: 0.9 INJECTION, SOLUTION INTRAVENOUS at 01:08

## 2019-08-16 RX ADMIN — ALBUTEROL SULFATE 1 PUFF: 90 AEROSOL, METERED RESPIRATORY (INHALATION) at 05:08

## 2019-08-16 RX ADMIN — HYDROCODONE BITARTRATE AND ACETAMINOPHEN 1 TABLET: 10; 325 TABLET ORAL at 05:08

## 2019-08-16 RX ADMIN — KETOROLAC TROMETHAMINE 15 MG: 30 INJECTION, SOLUTION INTRAMUSCULAR at 11:08

## 2019-08-16 RX ADMIN — ONDANSETRON 4 MG: 4 TABLET, ORALLY DISINTEGRATING ORAL at 05:08

## 2019-08-16 RX ADMIN — ACETAMINOPHEN 650 MG: 325 TABLET ORAL at 12:08

## 2019-08-16 RX ADMIN — VILAZODONE HYDROCHLORIDE 40 MG: 10 TABLET ORAL at 10:08

## 2019-08-16 RX ADMIN — CEFTRIAXONE SODIUM 1 G: 1 INJECTION, POWDER, FOR SOLUTION INTRAMUSCULAR; INTRAVENOUS at 08:08

## 2019-08-16 RX ADMIN — POTASSIUM CHLORIDE 40 MEQ: 20 TABLET, EXTENDED RELEASE ORAL at 01:08

## 2019-08-16 RX ADMIN — ONDANSETRON 4 MG: 4 TABLET, ORALLY DISINTEGRATING ORAL at 08:08

## 2019-08-16 RX ADMIN — SCOPALAMINE 1 PATCH: 1 PATCH, EXTENDED RELEASE TRANSDERMAL at 11:08

## 2019-08-16 RX ADMIN — HYDROCODONE BITARTRATE AND ACETAMINOPHEN 1 TABLET: 10; 325 TABLET ORAL at 07:08

## 2019-08-16 RX ADMIN — HYDROCODONE BITARTRATE AND ACETAMINOPHEN 1 TABLET: 10; 325 TABLET ORAL at 12:08

## 2019-08-16 RX ADMIN — AZITHROMYCIN 500 MG: 250 TABLET, FILM COATED ORAL at 10:08

## 2019-08-16 RX ADMIN — HYDROCODONE BITARTRATE AND ACETAMINOPHEN 1 TABLET: 10; 325 TABLET ORAL at 11:08

## 2019-08-16 RX ADMIN — CETIRIZINE HYDROCHLORIDE 10 MG: 10 TABLET, FILM COATED ORAL at 10:08

## 2019-08-16 NOTE — PLAN OF CARE
08/16/19 0811   Discharge Assessment   Assessment Type Discharge Planning Assessment   Confirmed/corrected address and phone number on facesheet? Yes   Assessment information obtained from? Patient   Expected Length of Stay (days) 1   Communicated expected length of stay with patient/caregiver yes   Prior to hospitilization cognitive status: Alert/Oriented   Prior to hospitalization functional status: Independent   Current cognitive status: Alert/Oriented   Current Functional Status: Independent   Lives With parent(s)   Able to Return to Prior Arrangements yes   Is patient able to care for self after discharge? Yes   Who are your caregiver(s) and their phone number(s)? Megan [phone]   Patient's perception of discharge disposition home or selfcare   Readmission Within the Last 30 Days no previous admission in last 30 days   Patient currently being followed by outpatient case management? No   Patient currently receives any other outside agency services? No   Equipment Currently Used at Home none   Do you have any problems affording any of your prescribed medications? No   Does the patient have transportation home? Yes   Transportation Anticipated family or friend will provide   Does the patient receive services at the Coumadin Clinic? No   Discharge Plan A Home   Discharge Plan B Home   DME Needed Upon Discharge  none   Patient/Family in Agreement with Plan yes

## 2019-08-16 NOTE — SUBJECTIVE & OBJECTIVE
"Past Medical History:   Diagnosis Date    ADHD (attention deficit hyperactivity disorder)     Asthma     Esophagitis     Food impaction of esophagus     Multiple food allergies     Vernal keratoconjunctivitis        Past Surgical History:   Procedure Laterality Date    ADENOIDECTOMY      ESOPHAGOGASTRODUODENOSCOPY (EGD) N/A 11/22/2014    Performed by Aman Hayes MD at [facility] OR [address] [place] [address]       Review of patient's allergies indicates:   Allergen Reactions    Fish containing products Other (See Comments)    Peanut Anaphylaxis    Amoxicillin Hives    Bactrim [sulfamethoxazole-trimethoprim] Hives    Corn containing products Other (See Comments)     Medications  Albuterol inhaler as needed  Adderall 20mg on weekdays   Viibryd 40mg daily   Dupixent q2w  Zytec 10mg daily    Family History:  Mother: asthma    Tobacco Use    Smoking status:  "Vapes" daily for years    Smokeless tobacco: Never Used   Substance and Sexual Activity    Alcohol use: No    Drug use: Yes     Types: Marijuana daily    Sexual activity: Yes     Partners: Female     Birth control/protection: Condom     Comment: quit 2 weeks ago.     Review of Systems   Constitutional: Positive for appetite change, chills, diaphoresis, fatigue and fever.   HENT: Negative for congestion, rhinorrhea and sinus pressure.    Eyes: Negative for redness and visual disturbance.   Respiratory: Positive for cough (nonproductive), chest tightness and shortness of breath. Negative for wheezing.    Cardiovascular: Negative for chest pain and palpitations.   Gastrointestinal: Positive for diarrhea (1 episode looser stools), nausea and vomiting. Negative for abdominal pain and constipation.   Genitourinary: Negative for difficulty urinating, dysuria and hematuria.   Musculoskeletal: Positive for myalgias. Negative for arthralgias.   Skin: Negative for pallor and rash.   Neurological: Positive for headaches. Negative for light-headedness.     Objective:     Vital " Signs (Most Recent):  Temp: 97.3 °F (36.3 °C) (08/15/19 2259)  Pulse: 101 (08/15/19 2259)  Resp: 18 (08/15/19 2259)  BP: 135/83 (08/15/19 2259)  SpO2: 96 % (08/15/19 2259) Vital Signs (24h Range):  Temp:  [97.3 °F (36.3 °C)-102 °F (38.9 °C)] 97.3 °F (36.3 °C)  Pulse:  [] 101  Resp:  [18] 18  SpO2:  [95 %-98 %] 96 %  BP: (125-136)/(68-83) 135/83     Weight: 75.8 kg (167 lb 1.7 oz)  Body mass index is 25.41 kg/m².    Physical Exam   Constitutional: He is oriented to person, place, and time. He appears well-developed.   HENT:   Head: Normocephalic and atraumatic.   Nose: Nose normal.   Mouth/Throat: Oropharynx is clear and moist. No oropharyngeal exudate.   Eyes: Pupils are equal, round, and reactive to light. Conjunctivae are normal. Right eye exhibits no discharge. Left eye exhibits no discharge. No scleral icterus.   Neck: Normal range of motion. Neck supple. No JVD present.   Cardiovascular: Normal rate, regular rhythm, normal heart sounds and intact distal pulses. Exam reveals no gallop and no friction rub.   No murmur heard.  Pulmonary/Chest: Effort normal and breath sounds normal. No respiratory distress. He has no wheezes. He has no rales.   Abdominal: Soft. Bowel sounds are normal. He exhibits no distension. There is no tenderness.   Musculoskeletal: Normal range of motion. He exhibits no edema.   Lymphadenopathy:     He has no cervical adenopathy.   Neurological: He is alert and oriented to person, place, and time.   Skin: Skin is warm and dry. No rash noted. No erythema.   Psychiatric: He has a normal mood and affect. His behavior is normal.        Significant Labs:   CBC:   Recent Labs   Lab 08/15/19  1653   WBC 17.06*   HGB 13.1*   HCT 38.8*   *     CMP:   Recent Labs   Lab 08/15/19  1653   *   K 3.7   CL 96   CO2 26   *   BUN 8   CREATININE 0.8   CALCIUM 9.8   PROT 8.4   ALBUMIN 3.1*   BILITOT 1.4*   ALKPHOS 75   AST 13   ALT 20   ANIONGAP 13   EGFRNONAA >60.0     Urine Studies:    Recent Labs   Lab 08/15/19  1922   COLORU Yellow   APPEARANCEUA Clear   PHUR 7.0   SPECGRAV 1.010   PROTEINUA 2+*   GLUCUA Negative   KETONESU 1+*   BILIRUBINUA Negative   OCCULTUA 2+*   NITRITE Negative   LEUKOCYTESUR Negative   RBCUA 11*   WBCUA 3   BACTERIA Occasional   HYALINECASTS 0     Flu negative    Significant Imaging:  Chest XR  FINDINGS:  Frontal and lateral views presented.    The heart and hilar shadows and trachea appear normal.  No pneumothorax or pleural effusion.  Visualized spine and bowel gas pattern appears normal.    There is new irregular patchy and streaky opacities in the mid and inferior lung.  This likely represents atypical pneumonia.  No discrete lobar consolidation seen.  No nodule or cavitary focus.    Left cervical rib.      Impression       Findings of atypical pneumonia.

## 2019-08-16 NOTE — HPI
Mr. Rice is a 17 yo male with history of atopic dermatitis, intermittent asthma, esophagitis and depression who presents with chief complaint of generalized body aches for 6 days. The aches occur mainly in his upper abdomen beneath his rib cage and upper back but have occurred elsewhere.  The pain is worse when he takes a deep breath and coughs, relieved by percocet taken once at home.  During the same time he has also had subjective fevers, chills, diaphoresis, decreased PO intake, occipital headaches, intermittent non-productive cough, and occasional shortness of breath related to pain. Beginning 2 days ago, he began to develop intermittent nausea and non-bloody, non-bilious vomiting. He had one episode of looser stool yesterday. He has not had congestion, runny nose, sore throat, rash, chest pain.  He said he has had one sick contact a few weeks ago with a friend who developed pneumonia.     In the ED, VS significant for patient febrile with Tm 102, tachycardic with , other VSS.  Labs were significant for WBC 17, UA positive for 11 RBC, Flu negative.  Chest xray obtained which revealed patchy and interstitial opacities bilaterally in mid and inferior lung zones concerning for atypical pneumonia. Otherwise there was no focal consolidation. He received doses of acetaminophen, ibuprofen and ketoralac for his pain with minimal improvement, 2L NS bolus, 1 dose of IV rochephin and 1 dose of azithromycin.

## 2019-08-16 NOTE — ASSESSMENT & PLAN NOTE
Likely in the setting of lung infection causing pleural irritation given pain worse with coughing and breathing.  Plan:  Treat underlying infection  Norco PRN for mod-severe pain

## 2019-08-16 NOTE — H&P
[facility] Hospital Medicine  History & Physical    Patient Name: Fernie Rice  MRN: [id number]  Admission Date: 8/15/2019  Attending Physician: William Burger MD   Primary Care Provider: Fernie Garcia MD    [facility] Medicine Team: [facility] HOSP MED 2 Lito Finney MD     Patient information was obtained from patient, parent and ER records.     Subjective:     Principal Problem:Atypical pneumonia    Chief Complaint:   Chief Complaint   Patient presents with    Generalized Body Aches     body aches with head and vomting since saturday. Seen at , not flu. Denies fevers at home.         HPI: Mr. Rice is a 19 yo male with history of atopic dermatitis, intermittent asthma, esophagitis and depression who presents with chief complaint of generalized body aches for 6 days. The aches occur mainly in his upper abdomen beneath his rib cage and upper back but have occurred elsewhere.  The pain is worse when he takes a deep breath and coughs, relieved by percocet taken once at home.  During the same time he has also had subjective fevers, chills, diaphoresis, decreased PO intake, occipital headaches, intermittent non-productive cough, and occasional shortness of breath related to pain. Beginning 2 days ago, he began to develop intermittent nausea and non-bloody, non-bilious vomiting. He had one episode of looser stool yesterday. He has not had congestion, runny nose, sore throat, rash, chest pain.  He said he has had one sick contact a few weeks ago with a friend who developed pneumonia.     In the ED, VS significant for patient febrile with Tm 102, tachycardic with , other VSS.  Labs were significant for WBC 17, UA positive for 11 RBC, Flu negative.  Chest xray obtained which revealed patchy and interstitial opacities bilaterally in mid and inferior lung zones concerning for atypical pneumonia. Otherwise there was no focal consolidation. He received doses of acetaminophen, ibuprofen and ketoralac for his  "pain with minimal improvement, 2L NS bolus, 1 dose of IV rochephin and 1 dose of azithromycin.     Past Medical History:   Diagnosis Date    ADHD (attention deficit hyperactivity disorder)     Asthma     Esophagitis     Food impaction of esophagus     Multiple food allergies     Vernal keratoconjunctivitis        Past Surgical History:   Procedure Laterality Date    ADENOIDECTOMY      ESOPHAGOGASTRODUODENOSCOPY (EGD) N/A 11/22/2014    Performed by Aman Hayes MD at [facility] OR [address] [place] [address]       Review of patient's allergies indicates:   Allergen Reactions    Fish containing products Other (See Comments)    Peanut Anaphylaxis    Amoxicillin Hives    Bactrim [sulfamethoxazole-trimethoprim] Hives    Corn containing products Other (See Comments)     Medications  Albuterol inhaler as needed  Adderall 20mg on weekdays   Viibryd 40mg daily   Dupixent q2w  Zytec 10mg daily    Family History:  Mother: asthma    Tobacco Use    Smoking status:  "Vapes" daily for years    Smokeless tobacco: Never Used   Substance and Sexual Activity    Alcohol use: No    Drug use: Yes     Types: Marijuana daily    Sexual activity: Yes     Partners: Female     Birth control/protection: Condom     Comment: quit 2 weeks ago.     Review of Systems   Constitutional: Positive for appetite change, chills, diaphoresis, fatigue and fever.   HENT: Negative for congestion, rhinorrhea and sinus pressure.    Eyes: Negative for redness and visual disturbance.   Respiratory: Positive for cough (nonproductive), chest tightness and shortness of breath. Negative for wheezing.    Cardiovascular: Negative for chest pain and palpitations.   Gastrointestinal: Positive for diarrhea (1 episode looser stools), nausea and vomiting. Negative for abdominal pain and constipation.   Genitourinary: Negative for difficulty urinating, dysuria and hematuria.   Musculoskeletal: Positive for myalgias. Negative for arthralgias.   Skin: Negative for pallor and rash. "   Neurological: Positive for headaches. Negative for light-headedness.     Objective:     Vital Signs (Most Recent):  Temp: 97.3 °F (36.3 °C) (08/15/19 2259)  Pulse: 101 (08/15/19 2259)  Resp: 18 (08/15/19 2259)  BP: 135/83 (08/15/19 2259)  SpO2: 96 % (08/15/19 2259) Vital Signs (24h Range):  Temp:  [97.3 °F (36.3 °C)-102 °F (38.9 °C)] 97.3 °F (36.3 °C)  Pulse:  [] 101  Resp:  [18] 18  SpO2:  [95 %-98 %] 96 %  BP: (125-136)/(68-83) 135/83     Weight: 75.8 kg (167 lb 1.7 oz)  Body mass index is 25.41 kg/m².    Physical Exam   Constitutional: He is oriented to person, place, and time. He appears well-developed.   HENT:   Head: Normocephalic and atraumatic.   Nose: Nose normal.   Mouth/Throat: Oropharynx is clear and moist. No oropharyngeal exudate.   Eyes: Pupils are equal, round, and reactive to light. Conjunctivae are normal. Right eye exhibits no discharge. Left eye exhibits no discharge. No scleral icterus.   Neck: Normal range of motion. Neck supple. No JVD present.   Cardiovascular: Normal rate, regular rhythm, normal heart sounds and intact distal pulses. Exam reveals no gallop and no friction rub.   No murmur heard.  Pulmonary/Chest: Effort normal and breath sounds normal. No respiratory distress. He has no wheezes. He has no rales.   Abdominal: Soft. Bowel sounds are normal. He exhibits no distension. There is no tenderness.   Musculoskeletal: Normal range of motion. He exhibits no edema.   Lymphadenopathy:     He has no cervical adenopathy.   Neurological: He is alert and oriented to person, place, and time.   Skin: Skin is warm and dry. No rash noted. No erythema.   Psychiatric: He has a normal mood and affect. His behavior is normal.        Significant Labs:   CBC:   Recent Labs   Lab 08/15/19  1653   WBC 17.06*   HGB 13.1*   HCT 38.8*   *     CMP:   Recent Labs   Lab 08/15/19  1653   *   K 3.7   CL 96   CO2 26   *   BUN 8   CREATININE 0.8   CALCIUM 9.8   PROT 8.4   ALBUMIN 3.1*    BILITOT 1.4*   ALKPHOS 75   AST 13   ALT 20   ANIONGAP 13   EGFRNONAA >60.0     Urine Studies:   Recent Labs   Lab 08/15/19  1922   COLORU Yellow   APPEARANCEUA Clear   PHUR 7.0   SPECGRAV 1.010   PROTEINUA 2+*   GLUCUA Negative   KETONESU 1+*   BILIRUBINUA Negative   OCCULTUA 2+*   NITRITE Negative   LEUKOCYTESUR Negative   RBCUA 11*   WBCUA 3   BACTERIA Occasional   HYALINECASTS 0     Flu negative    Significant Imaging:  Chest XR  FINDINGS:  Frontal and lateral views presented.    The heart and hilar shadows and trachea appear normal.  No pneumothorax or pleural effusion.  Visualized spine and bowel gas pattern appears normal.    There is new irregular patchy and streaky opacities in the mid and inferior lung.  This likely represents atypical pneumonia.  No discrete lobar consolidation seen.  No nodule or cavitary focus.    Left cervical rib.      Impression       Findings of atypical pneumonia.         Assessment/Plan:   Atypical pneumonia  Patient with cough, fevers, decreased PO intake, nausea, vomiting, generalized body aches for 6 days. CXR with interstitial and patchy opacities without focal consolidation consistent with atypical pneumonia. Mycoplasmae pneumoniae vs viral.  Likely due to immunosuppression in the setting of dupixent biologic.  Smoking history and asthma history also puts the patient at increased risk. Patient flu negative. Blood cultures pending.     Plan:  -M. Pneumoniae PCR ordered  -Viral respiratory panel ordered  -Will treat with Ceftriaxone 1g IV q24 and azitrhomycin 500mg daily with planned total duration 5 days for now  -Follow up blood cultures and tailor antibiotic therapy appropriately if positive  -Will keep overnight for observation with likely discharge on oral antibiotics tomorrow    Sepsis due to pneumonia  Patient tachycardic, febrile with fever 102, and elevated white count, SIRS 3/4. Afebrile and improved tachycardia with 2L bolus and starting antibiotics.  Plan:  Will  start IVF overnight @ 100cc/hour for 9 hours    Back pain  Likely in the setting of lung infection causing pleural irritation given pain worse with coughing and breathing.  Plan:  Treat underlying infection  Norco PRN for mod-severe pain        VTE Risk Mitigation (From admission, onward)        Ordered     IP VTE LOW RISK PATIENT  Once      08/15/19 2358     Place sequential compression device  Until discontinued      08/15/19 2358             Lito Finney MD  Department of Hospital Medicine   [facility]

## 2019-08-16 NOTE — CARE UPDATE
"RAPID RESPONSE NURSE PROACTIVE ROUNDING NOTE     Time of Visit: 1236    Admit Date: 8/15/2019  LOS: 1  Code Status: No Order   Date of Visit: 2019  : 2000  Age: 18 y.o.  Sex: male  Race: White  Bed: 4/[facility] A:   MRN: [id number]  Was the patient discharged from an ICU this admission? no   Was the patient discharged from a PACU within last 24 hours?  no  Did the patient receive conscious sedation/general anesthesia in last 24 hours?  no  Was the patient in the ED within the past 24 hours?  yes  Was the patient started on NIPPV within the past 24 hours?  no  Attending Physician: Destiny Connelly MD  Primary Service: [facility] HOSP MED 2    ASSESSMENT     Diagnosis: Sepsis due to pneumonia    Abnormal Vital Signs: /71 (BP Location: Right arm, Patient Position: Lying)   Pulse (!) 122   Temp (!) 102.6 °F (39.2 °C) (Oral)   Resp 18   Ht 5' 8" (1.727 m)   Wt 75.8 kg (167 lb 1.7 oz)   SpO2 (!) 94%   BMI 25.41 kg/m²      Clinical Issues: Circulatory    Patient  has a past medical history of ADHD (attention deficit hyperactivity disorder), Asthma, Esophagitis, Food impaction of esophagus, Multiple food allergies, and Vernal keratoconjunctivitis.    Upon assessment, pt sats 94% while on 3L NC (was prior 86% on room air). Temp rechecked after 650 mg tylenol given, 102.6. Pt states that he does not feel well at all. Tachycardic 120s, may be in response to temp. Primary team notified. Suggested for possible lactic, repeat chest x-ray to be done. Orders have been placed.     INTERVENTIONS/ RECOMMENDATIONS     Monitor temp closely along with respiratory status. Wean O2 as tolerated    Discussed plan of care with Consuelo WADE.    PHYSICIAN ESCALATION     Yes/No  yes    Orders received and case discussed with Dr. Chavis.    Disposition: Remain in room 624A.    FOLLOW-UP     Call back the Rapid Response Nurse, Leanne Toscano RN at [phone] for additional questions or concerns.          "

## 2019-08-16 NOTE — ASSESSMENT & PLAN NOTE
Patient tachycardic, febrile with fever 102, and elevated white count, SIRS 3/4. Afebrile and improved tachycardia with 2L bolus and starting antibiotics.  Plan:  Will start IVF overnight @ 100cc/hour for 9 hours

## 2019-08-16 NOTE — ASSESSMENT & PLAN NOTE
Patient with cough, fevers, decreased PO intake, nausea, vomiting, generalized body aches for 6 days. CXR with interstitial and patchy opacities without focal consolidation consistent with atypical pneumonia. Mycoplasmae pneumoniae vs viral.  Likely due to immunosuppression in the setting of dupixent biologic.  Smoking history and asthma history also puts the patient at increased risk. Patient flu negative. Blood cultures pending.     Plan:  -M. Pneumoniae PCR ordered  -Viral respiratory panel ordered  -Will treat with Ceftriaxone 1g IV q24 and azitrhomycin 500mg daily with planned total duration 5 days for now  -Follow up blood cultures and tailor antibiotic therapy appropriately if positive  -Will keep overnight for observation with likely discharge on oral antibiotics tomorrow

## 2019-08-17 PROBLEM — J96.01 ACUTE HYPOXEMIC RESPIRATORY FAILURE: Status: ACTIVE | Noted: 2019-08-17

## 2019-08-17 LAB
ALBUMIN SERPL BCP-MCNC: 2.5 G/DL (ref 3.5–5.2)
ALP SERPL-CCNC: 88 U/L (ref 55–135)
ALT SERPL W/O P-5'-P-CCNC: 17 U/L (ref 10–44)
ANION GAP SERPL CALC-SCNC: 11 MMOL/L (ref 8–16)
AST SERPL-CCNC: 16 U/L (ref 10–40)
BASOPHILS # BLD AUTO: 0.01 K/UL (ref 0–0.2)
BASOPHILS # BLD AUTO: 0.02 K/UL (ref 0–0.2)
BASOPHILS NFR BLD: 0.1 % (ref 0–1.9)
BASOPHILS NFR BLD: 0.1 % (ref 0–1.9)
BILIRUB SERPL-MCNC: 0.9 MG/DL (ref 0.1–1)
BNP SERPL-MCNC: 59 PG/ML (ref 0–99)
BUN SERPL-MCNC: 7 MG/DL (ref 6–20)
CALCIUM SERPL-MCNC: 9.7 MG/DL (ref 8.7–10.5)
CHLORIDE SERPL-SCNC: 99 MMOL/L (ref 95–110)
CO2 SERPL-SCNC: 28 MMOL/L (ref 23–29)
CREAT SERPL-MCNC: 0.7 MG/DL (ref 0.5–1.4)
DIFFERENTIAL METHOD: ABNORMAL
DIFFERENTIAL METHOD: ABNORMAL
EOSINOPHIL # BLD AUTO: 0.3 K/UL (ref 0–0.5)
EOSINOPHIL # BLD AUTO: 0.5 K/UL (ref 0–0.5)
EOSINOPHIL NFR BLD: 2.1 % (ref 0–8)
EOSINOPHIL NFR BLD: 3.3 % (ref 0–8)
ERYTHROCYTE [DISTWIDTH] IN BLOOD BY AUTOMATED COUNT: 12.5 % (ref 11.5–14.5)
ERYTHROCYTE [DISTWIDTH] IN BLOOD BY AUTOMATED COUNT: 12.6 % (ref 11.5–14.5)
EST. GFR  (AFRICAN AMERICAN): >60 ML/MIN/1.73 M^2
EST. GFR  (NON AFRICAN AMERICAN): >60 ML/MIN/1.73 M^2
GLUCOSE SERPL-MCNC: 105 MG/DL (ref 70–110)
HCT VFR BLD AUTO: 34.7 % (ref 40–54)
HCT VFR BLD AUTO: 35.3 % (ref 40–54)
HGB BLD-MCNC: 11.6 G/DL (ref 14–18)
HGB BLD-MCNC: 11.7 G/DL (ref 14–18)
IMM GRANULOCYTES # BLD AUTO: 0.11 K/UL (ref 0–0.04)
IMM GRANULOCYTES # BLD AUTO: 0.11 K/UL (ref 0–0.04)
IMM GRANULOCYTES NFR BLD AUTO: 0.8 % (ref 0–0.5)
IMM GRANULOCYTES NFR BLD AUTO: 0.8 % (ref 0–0.5)
LACTATE SERPL-SCNC: 1 MMOL/L (ref 0.5–2.2)
LACTATE SERPL-SCNC: 2.5 MMOL/L (ref 0.5–2.2)
LYMPHOCYTES # BLD AUTO: 0.7 K/UL (ref 1–4.8)
LYMPHOCYTES # BLD AUTO: 0.9 K/UL (ref 1–4.8)
LYMPHOCYTES NFR BLD: 5.4 % (ref 18–48)
LYMPHOCYTES NFR BLD: 6.3 % (ref 18–48)
MAGNESIUM SERPL-MCNC: 1.8 MG/DL (ref 1.6–2.6)
MCH RBC QN AUTO: 28.9 PG (ref 27–31)
MCH RBC QN AUTO: 29.5 PG (ref 27–31)
MCHC RBC AUTO-ENTMCNC: 33.1 G/DL (ref 32–36)
MCHC RBC AUTO-ENTMCNC: 33.4 G/DL (ref 32–36)
MCV RBC AUTO: 87 FL (ref 82–98)
MCV RBC AUTO: 89 FL (ref 82–98)
MONOCYTES # BLD AUTO: 0.2 K/UL (ref 0.3–1)
MONOCYTES # BLD AUTO: 0.2 K/UL (ref 0.3–1)
MONOCYTES NFR BLD: 1.1 % (ref 4–15)
MONOCYTES NFR BLD: 1.2 % (ref 4–15)
NEUTROPHILS # BLD AUTO: 11.8 K/UL (ref 1.8–7.7)
NEUTROPHILS # BLD AUTO: 12.2 K/UL (ref 1.8–7.7)
NEUTROPHILS NFR BLD: 88.3 % (ref 38–73)
NEUTROPHILS NFR BLD: 90.5 % (ref 38–73)
NRBC BLD-RTO: 0 /100 WBC
NRBC BLD-RTO: 0 /100 WBC
PHOSPHATE SERPL-MCNC: 3.9 MG/DL (ref 2.7–4.5)
PLATELET # BLD AUTO: 355 K/UL (ref 150–350)
PLATELET # BLD AUTO: 365 K/UL (ref 150–350)
PMV BLD AUTO: 9.3 FL (ref 9.2–12.9)
PMV BLD AUTO: 9.9 FL (ref 9.2–12.9)
POTASSIUM SERPL-SCNC: 3.9 MMOL/L (ref 3.5–5.1)
PROT SERPL-MCNC: 7.1 G/DL (ref 6–8.4)
RBC # BLD AUTO: 3.96 M/UL (ref 4.6–6.2)
RBC # BLD AUTO: 4.01 M/UL (ref 4.6–6.2)
SODIUM SERPL-SCNC: 138 MMOL/L (ref 136–145)
WBC # BLD AUTO: 13.05 K/UL (ref 3.9–12.7)
WBC # BLD AUTO: 13.79 K/UL (ref 3.9–12.7)

## 2019-08-17 PROCEDURE — 83880 ASSAY OF NATRIURETIC PEPTIDE: CPT

## 2019-08-17 PROCEDURE — 83735 ASSAY OF MAGNESIUM: CPT

## 2019-08-17 PROCEDURE — 63600175 PHARM REV CODE 636 W HCPCS: Performed by: HOSPITALIST

## 2019-08-17 PROCEDURE — 25000242 PHARM REV CODE 250 ALT 637 W/ HCPCS: Performed by: STUDENT IN AN ORGANIZED HEALTH CARE EDUCATION/TRAINING PROGRAM

## 2019-08-17 PROCEDURE — 84100 ASSAY OF PHOSPHORUS: CPT

## 2019-08-17 PROCEDURE — 63600175 PHARM REV CODE 636 W HCPCS: Performed by: STUDENT IN AN ORGANIZED HEALTH CARE EDUCATION/TRAINING PROGRAM

## 2019-08-17 PROCEDURE — 36415 COLL VENOUS BLD VENIPUNCTURE: CPT

## 2019-08-17 PROCEDURE — 80053 COMPREHEN METABOLIC PANEL: CPT

## 2019-08-17 PROCEDURE — 63600175 PHARM REV CODE 636 W HCPCS: Performed by: INTERNAL MEDICINE

## 2019-08-17 PROCEDURE — 94761 N-INVAS EAR/PLS OXIMETRY MLT: CPT

## 2019-08-17 PROCEDURE — 87040 BLOOD CULTURE FOR BACTERIA: CPT

## 2019-08-17 PROCEDURE — 25000003 PHARM REV CODE 250: Performed by: STUDENT IN AN ORGANIZED HEALTH CARE EDUCATION/TRAINING PROGRAM

## 2019-08-17 PROCEDURE — 85025 COMPLETE CBC W/AUTO DIFF WBC: CPT

## 2019-08-17 PROCEDURE — 11000001 HC ACUTE MED/SURG PRIVATE ROOM

## 2019-08-17 PROCEDURE — 27000221 HC OXYGEN, UP TO 24 HOURS

## 2019-08-17 PROCEDURE — 86703 HIV-1/HIV-2 1 RESULT ANTBDY: CPT

## 2019-08-17 PROCEDURE — 83605 ASSAY OF LACTIC ACID: CPT | Mod: 91

## 2019-08-17 PROCEDURE — 94640 AIRWAY INHALATION TREATMENT: CPT

## 2019-08-17 PROCEDURE — 99233 SBSQ HOSP IP/OBS HIGH 50: CPT | Mod: ,,, | Performed by: HOSPITALIST

## 2019-08-17 PROCEDURE — 83605 ASSAY OF LACTIC ACID: CPT

## 2019-08-17 PROCEDURE — 99233 PR SUBSEQUENT HOSPITAL CARE,LEVL III: ICD-10-PCS | Mod: ,,, | Performed by: HOSPITALIST

## 2019-08-17 RX ORDER — IPRATROPIUM BROMIDE AND ALBUTEROL SULFATE 2.5; .5 MG/3ML; MG/3ML
3 SOLUTION RESPIRATORY (INHALATION) EVERY 4 HOURS PRN
Status: DISCONTINUED | OUTPATIENT
Start: 2019-08-17 | End: 2019-08-20 | Stop reason: HOSPADM

## 2019-08-17 RX ORDER — IPRATROPIUM BROMIDE AND ALBUTEROL SULFATE 2.5; .5 MG/3ML; MG/3ML
3 SOLUTION RESPIRATORY (INHALATION) ONCE AS NEEDED
Status: DISCONTINUED | OUTPATIENT
Start: 2019-08-17 | End: 2019-08-17

## 2019-08-17 RX ORDER — IPRATROPIUM BROMIDE AND ALBUTEROL SULFATE 2.5; .5 MG/3ML; MG/3ML
3 SOLUTION RESPIRATORY (INHALATION) EVERY 4 HOURS
Status: DISCONTINUED | OUTPATIENT
Start: 2019-08-17 | End: 2019-08-20 | Stop reason: HOSPADM

## 2019-08-17 RX ORDER — IPRATROPIUM BROMIDE AND ALBUTEROL SULFATE 2.5; .5 MG/3ML; MG/3ML
3 SOLUTION RESPIRATORY (INHALATION) EVERY 6 HOURS PRN
Status: DISCONTINUED | OUTPATIENT
Start: 2019-08-17 | End: 2019-08-17

## 2019-08-17 RX ORDER — IPRATROPIUM BROMIDE AND ALBUTEROL SULFATE 2.5; .5 MG/3ML; MG/3ML
3 SOLUTION RESPIRATORY (INHALATION) ONCE AS NEEDED
Status: COMPLETED | OUTPATIENT
Start: 2019-08-17 | End: 2019-08-17

## 2019-08-17 RX ORDER — SODIUM,POTASSIUM PHOSPHATES 280-250MG
2 POWDER IN PACKET (EA) ORAL ONCE
Status: COMPLETED | OUTPATIENT
Start: 2019-08-17 | End: 2019-08-17

## 2019-08-17 RX ORDER — METHYLPREDNISOLONE SOD SUCC 125 MG
125 VIAL (EA) INJECTION DAILY
Status: DISCONTINUED | OUTPATIENT
Start: 2019-08-17 | End: 2019-08-17

## 2019-08-17 RX ORDER — METHYLPREDNISOLONE SOD SUCC 125 MG
125 VIAL (EA) INJECTION ONCE
Status: COMPLETED | OUTPATIENT
Start: 2019-08-17 | End: 2019-08-17

## 2019-08-17 RX ORDER — CEFEPIME HYDROCHLORIDE 2 G/1
2 INJECTION, POWDER, FOR SOLUTION INTRAVENOUS
Status: DISCONTINUED | OUTPATIENT
Start: 2019-08-17 | End: 2019-08-17

## 2019-08-17 RX ORDER — VANCOMYCIN 2 GRAM/500 ML IN 0.9 % SODIUM CHLORIDE INTRAVENOUS
25
Status: DISCONTINUED | OUTPATIENT
Start: 2019-08-17 | End: 2019-08-17

## 2019-08-17 RX ORDER — CEFEPIME HYDROCHLORIDE 2 G/1
2 INJECTION, POWDER, FOR SOLUTION INTRAVENOUS
Status: DISCONTINUED | OUTPATIENT
Start: 2019-08-17 | End: 2019-08-19

## 2019-08-17 RX ADMIN — IPRATROPIUM BROMIDE AND ALBUTEROL SULFATE 3 ML: .5; 3 SOLUTION RESPIRATORY (INHALATION) at 07:08

## 2019-08-17 RX ADMIN — CETIRIZINE HYDROCHLORIDE 10 MG: 10 TABLET, FILM COATED ORAL at 09:08

## 2019-08-17 RX ADMIN — HYDROCODONE BITARTRATE AND ACETAMINOPHEN 1 TABLET: 10; 325 TABLET ORAL at 12:08

## 2019-08-17 RX ADMIN — ONDANSETRON 4 MG: 4 TABLET, ORALLY DISINTEGRATING ORAL at 07:08

## 2019-08-17 RX ADMIN — IPRATROPIUM BROMIDE AND ALBUTEROL SULFATE 3 ML: .5; 3 SOLUTION RESPIRATORY (INHALATION) at 10:08

## 2019-08-17 RX ADMIN — VILAZODONE HYDROCHLORIDE 40 MG: 10 TABLET ORAL at 09:08

## 2019-08-17 RX ADMIN — IPRATROPIUM BROMIDE AND ALBUTEROL SULFATE 3 ML: .5; 3 SOLUTION RESPIRATORY (INHALATION) at 12:08

## 2019-08-17 RX ADMIN — ACETAMINOPHEN 650 MG: 325 TABLET ORAL at 11:08

## 2019-08-17 RX ADMIN — METHYLPREDNISOLONE SODIUM SUCCINATE 125 MG: 125 INJECTION, POWDER, FOR SOLUTION INTRAMUSCULAR; INTRAVENOUS at 06:08

## 2019-08-17 RX ADMIN — Medication 2000 MG: at 01:08

## 2019-08-17 RX ADMIN — ONDANSETRON 4 MG: 4 TABLET, ORALLY DISINTEGRATING ORAL at 10:08

## 2019-08-17 RX ADMIN — AZITHROMYCIN 500 MG: 250 TABLET, FILM COATED ORAL at 09:08

## 2019-08-17 RX ADMIN — IPRATROPIUM BROMIDE AND ALBUTEROL SULFATE 3 ML: .5; 3 SOLUTION RESPIRATORY (INHALATION) at 05:08

## 2019-08-17 RX ADMIN — POTASSIUM & SODIUM PHOSPHATES POWDER PACK 280-160-250 MG 2 PACKET: 280-160-250 PACK at 02:08

## 2019-08-17 RX ADMIN — ACETAMINOPHEN 650 MG: 325 TABLET ORAL at 12:08

## 2019-08-17 RX ADMIN — IPRATROPIUM BROMIDE AND ALBUTEROL SULFATE 3 ML: .5; 3 SOLUTION RESPIRATORY (INHALATION) at 01:08

## 2019-08-17 RX ADMIN — IPRATROPIUM BROMIDE AND ALBUTEROL SULFATE 3 ML: .5; 3 SOLUTION RESPIRATORY (INHALATION) at 11:08

## 2019-08-17 RX ADMIN — CEFEPIME 2 G: 2 INJECTION, POWDER, FOR SOLUTION INTRAVENOUS at 09:08

## 2019-08-17 RX ADMIN — HYDROCODONE BITARTRATE AND ACETAMINOPHEN 1 TABLET: 10; 325 TABLET ORAL at 07:08

## 2019-08-17 RX ADMIN — ONDANSETRON 4 MG: 4 TABLET, ORALLY DISINTEGRATING ORAL at 12:08

## 2019-08-17 RX ADMIN — CEFEPIME 2 G: 2 INJECTION, POWDER, FOR SOLUTION INTRAVENOUS at 01:08

## 2019-08-17 NOTE — SUBJECTIVE & OBJECTIVE
Interval History: Patient appeared improved in the morning with his main complaint being his back and chest wall pain. Throughout the morning, patient desaturated multiple times, during which he became febrile, had SOB, chills, facial flushing relieved by tylenol and duonebs.    Review of Systems   Constitutional: Positive for appetite change, chills, diaphoresis, fatigue and fever.   HENT: Negative for congestion, rhinorrhea and sinus pressure.    Eyes: Negative for redness and visual disturbance.   Respiratory: Positive for cough (nonproductive), chest tightness and shortness of breath. Negative for wheezing.    Cardiovascular: Negative for chest pain and palpitations.   Gastrointestinal: Positive for diarrhea (1 episode looser stools), nausea and vomiting. Negative for abdominal pain and constipation.   Genitourinary: Negative for difficulty urinating, dysuria and hematuria.   Musculoskeletal: Positive for myalgias. Negative for arthralgias.   Skin: Negative for pallor and rash.   Neurological: Positive for headaches. Negative for light-headedness.     Objective:     Vital Signs (Most Recent):  Temp: 98.9 °F (37.2 °C) (08/17/19 1325)  Pulse: 77 (08/17/19 1712)  Resp: 18 (08/17/19 1712)  BP: 131/81 (08/17/19 1651)  SpO2: 98 % (08/17/19 1712) Vital Signs (24h Range):  Temp:  [97.6 °F (36.4 °C)-101.6 °F (38.7 °C)] 98.9 °F (37.2 °C)  Pulse:  [] 77  Resp:  [12-32] 18  SpO2:  [88 %-98 %] 98 %  BP: (125-155)/(71-81) 131/81     Weight: 75.8 kg (167 lb 1.7 oz)  Body mass index is 25.41 kg/m².  No intake or output data in the 24 hours ending 08/17/19 1813   Physical Exam   Constitutional: He is oriented to person, place, and time. He appears well-developed and well-nourished.   HENT:   Head: Normocephalic and atraumatic.   Nose: Nose normal.   Mouth/Throat: Oropharynx is clear and moist. No oropharyngeal exudate.   Eyes: Pupils are equal, round, and reactive to light. Conjunctivae are normal. Right eye exhibits no  discharge. Left eye exhibits no discharge. No scleral icterus.   Neck: Normal range of motion. Neck supple. No JVD present.   Cardiovascular: Regular rhythm, normal heart sounds and intact distal pulses. Tachycardia present. Exam reveals no gallop and no friction rub.   No murmur heard.  Pulmonary/Chest: Effort normal. No respiratory distress. He has no wheezes. He has no rales.   On 3L, coarse breath sounds bilaterally   Abdominal: Soft. Bowel sounds are normal. He exhibits no distension. There is no tenderness.   Musculoskeletal: Normal range of motion. He exhibits no edema.   Lymphadenopathy:     He has no cervical adenopathy.   Neurological: He is alert and oriented to person, place, and time.   Skin: Skin is warm and dry. No rash noted.   Facial flushing   Psychiatric: He has a normal mood and affect. His behavior is normal.       Significant Labs: All pertinent labs within the past 24 hours have been reviewed.  Recent Results (from the past 24 hour(s))   CBC auto differential    Collection Time: 08/17/19 12:55 AM   Result Value Ref Range    WBC 13.05 (H) 3.90 - 12.70 K/uL    RBC 4.01 (L) 4.60 - 6.20 M/uL    Hemoglobin 11.6 (L) 14.0 - 18.0 g/dL    Hematocrit 34.7 (L) 40.0 - 54.0 %    Mean Corpuscular Volume 87 82 - 98 fL    Mean Corpuscular Hemoglobin 28.9 27.0 - 31.0 pg    Mean Corpuscular Hemoglobin Conc 33.4 32.0 - 36.0 g/dL    RDW 12.5 11.5 - 14.5 %    Platelets 355 (H) 150 - 350 K/uL    MPV 9.3 9.2 - 12.9 fL    Immature Granulocytes 0.8 (H) 0.0 - 0.5 %    Gran # (ANC) 11.8 (H) 1.8 - 7.7 K/uL    Immature Grans (Abs) 0.11 (H) 0.00 - 0.04 K/uL    Lymph # 0.7 (L) 1.0 - 4.8 K/uL    Mono # 0.2 (L) 0.3 - 1.0 K/uL    Eos # 0.3 0.0 - 0.5 K/uL    Baso # 0.01 0.00 - 0.20 K/uL    nRBC 0 0 /100 WBC    Gran% 90.5 (H) 38.0 - 73.0 %    Lymph% 5.4 (L) 18.0 - 48.0 %    Mono% 1.1 (L) 4.0 - 15.0 %    Eosinophil% 2.1 0.0 - 8.0 %    Basophil% 0.1 0.0 - 1.9 %    Differential Method Automated    Lactic acid, plasma    Collection  Time: 08/17/19 12:55 AM   Result Value Ref Range    Lactate (Lactic Acid) 1.0 0.5 - 2.2 mmol/L   Blood culture    Collection Time: 08/17/19 12:55 AM   Result Value Ref Range    Blood Culture, Routine No Growth to date    Blood culture    Collection Time: 08/17/19 12:56 AM   Result Value Ref Range    Blood Culture, Routine No Growth to date    Comprehensive Metabolic Panel (CMP)    Collection Time: 08/17/19  3:57 AM   Result Value Ref Range    Sodium 138 136 - 145 mmol/L    Potassium 3.9 3.5 - 5.1 mmol/L    Chloride 99 95 - 110 mmol/L    CO2 28 23 - 29 mmol/L    Glucose 105 70 - 110 mg/dL    BUN, Bld 7 6 - 20 mg/dL    Creatinine 0.7 0.5 - 1.4 mg/dL    Calcium 9.7 8.7 - 10.5 mg/dL    Total Protein 7.1 6.0 - 8.4 g/dL    Albumin 2.5 (L) 3.5 - 5.2 g/dL    Total Bilirubin 0.9 0.1 - 1.0 mg/dL    Alkaline Phosphatase 88 55 - 135 U/L    AST 16 10 - 40 U/L    ALT 17 10 - 44 U/L    Anion Gap 11 8 - 16 mmol/L    eGFR if African American >60.0 >60 mL/min/1.73 m^2    eGFR if non African American >60.0 >60 mL/min/1.73 m^2   Magnesium    Collection Time: 08/17/19  3:57 AM   Result Value Ref Range    Magnesium 1.8 1.6 - 2.6 mg/dL   Phosphorus    Collection Time: 08/17/19  3:57 AM   Result Value Ref Range    Phosphorus 3.9 2.7 - 4.5 mg/dL   CBC with Automated Differential    Collection Time: 08/17/19  3:57 AM   Result Value Ref Range    WBC 13.79 (H) 3.90 - 12.70 K/uL    RBC 3.96 (L) 4.60 - 6.20 M/uL    Hemoglobin 11.7 (L) 14.0 - 18.0 g/dL    Hematocrit 35.3 (L) 40.0 - 54.0 %    Mean Corpuscular Volume 89 82 - 98 fL    Mean Corpuscular Hemoglobin 29.5 27.0 - 31.0 pg    Mean Corpuscular Hemoglobin Conc 33.1 32.0 - 36.0 g/dL    RDW 12.6 11.5 - 14.5 %    Platelets 365 (H) 150 - 350 K/uL    MPV 9.9 9.2 - 12.9 fL    Immature Granulocytes 0.8 (H) 0.0 - 0.5 %    Gran # (ANC) 12.2 (H) 1.8 - 7.7 K/uL    Immature Grans (Abs) 0.11 (H) 0.00 - 0.04 K/uL    Lymph # 0.9 (L) 1.0 - 4.8 K/uL    Mono # 0.2 (L) 0.3 - 1.0 K/uL    Eos # 0.5 0.0 - 0.5 K/uL     Baso # 0.02 0.00 - 0.20 K/uL    nRBC 0 0 /100 WBC    Gran% 88.3 (H) 38.0 - 73.0 %    Lymph% 6.3 (L) 18.0 - 48.0 %    Mono% 1.2 (L) 4.0 - 15.0 %    Eosinophil% 3.3 0.0 - 8.0 %    Basophil% 0.1 0.0 - 1.9 %    Differential Method Automated    Lactic acid, plasma    Collection Time: 08/17/19 12:07 PM   Result Value Ref Range    Lactate (Lactic Acid) 2.5 (H) 0.5 - 2.2 mmol/L   Brain natriuretic peptide    Collection Time: 08/17/19  1:42 PM   Result Value Ref Range    BNP 59 0 - 99 pg/mL       Significant Imaging: I have reviewed all pertinent imaging results/findings within the past 24 hours.

## 2019-08-17 NOTE — ASSESSMENT & PLAN NOTE
Likely in the setting of lung infection causing pleural irritation given pain worse with coughing and breathing.    - Has PRN norco 5 and 10

## 2019-08-17 NOTE — PROGRESS NOTES
Pharmacokinetic Initial Assessment: IV Vancomycin    Assessment/Plan:    Initiate intravenous vancomycin with loading dose of 2000 mg once followed by a maintenance dose of vancomycin 1250mg IV every 12 hours  Desired empiric serum trough concentration is 15 to 20 mcg/mL  Draw vancomycin trough level 30 min prior to third dose on 8/18 at approximately 12:30  Pharmacy will continue to follow and monitor vancomycin.      Please contact pharmacy at extension [phone]  with any questions regarding this assessment.     Thank you for the consult,   Omar Mustafa       Patient brief summary:  Fernie Rice is a 18 y.o. male initiated on antimicrobial therapy with IV Vancomycin for treatment of suspected Pneumonia    Drug Allergies:   Review of patient's allergies indicates:   Allergen Reactions    Fish containing products Other (See Comments)    Peanut Anaphylaxis    Amoxicillin Hives     Tolerates ceftriaxone    Bactrim [sulfamethoxazole-trimethoprim] Hives    Corn containing products Other (See Comments)       Actual Body Weight:   75.8Kg      Renal Function:   Estimated Creatinine Clearance: 162.9 mL/min (based on SCr of 0.7 mg/dL).,     Dialysis Method (if applicable):      CBC (last 72 hours):  Recent Labs   Lab Result Units 08/15/19  1653 08/16/19  0524 08/17/19  0055 08/17/19  0357   WBC K/uL 17.06* 14.20* 13.05* 13.79*   Hemoglobin g/dL 13.1* 12.7* 11.6* 11.7*   Hematocrit % 38.8* 40.0 34.7* 35.3*   Platelets K/uL 374* 342 355* 365*   Gran% % 93.5* 89.5* 90.5* 88.3*   Lymph% % 3.3* 5.1* 5.4* 6.3*   Mono% % 1.6* 1.0* 1.1* 1.2*   Eosinophil% % 1.0 3.7 2.1 3.3   Basophil% % 0.1 0.1 0.1 0.1   Differential Method  Automated Automated Automated Automated       Metabolic Panel (last 72 hours):  Recent Labs   Lab Result Units 08/15/19  1653 08/15/19  1922 08/16/19  0524 08/17/19  0357   Sodium mmol/L 135*  --  139 138   Potassium mmol/L 3.7  --  4.0 3.9   Chloride mmol/L 96  --  102 99   CO2 mmol/L 26  --  25 28   Glucose  mg/dL 116*  --  104 105   Glucose, UA   --  Negative  --   --    BUN, Bld mg/dL 8  --  11 7   Creatinine mg/dL 0.8  --  0.7 0.7   Albumin g/dL 3.1*  --  2.7* 2.5*   Total Bilirubin mg/dL 1.4*  --  0.9 0.9   Alkaline Phosphatase U/L 75  --  95 88   AST U/L 13  --  22 16   ALT U/L 20  --  24 17   Magnesium mg/dL  --   --  1.7 1.8   Phosphorus mg/dL  --   --  2.1* 3.9       Drug levels (last 3 results):  No results for input(s): VANCOMYCINRA, VANCOMYCINPE, VANCOMYCINTR in the last 72 hours.    Microbiologic Results:  Microbiology Results (last 7 days)     Procedure Component Value Units Date/Time    Blood culture [[id number]] Collected:  08/17/19 0056    Order Status:  Completed Specimen:  Blood Updated:  08/17/19 0915     Blood Culture, Routine No Growth to date    Blood culture [[id number]] Collected:  08/17/19 0055    Order Status:  Completed Specimen:  Blood Updated:  08/17/19 0915     Blood Culture, Routine No Growth to date    Blood culture [[id number]]     Order Status:  Canceled Specimen:  Blood     Blood culture [[id number]]     Order Status:  Canceled Specimen:  Blood     Blood culture #1 **CANNOT BE ORDERED STAT** [[id number]] Collected:  08/15/19 2043    Order Status:  Completed Specimen:  Blood from Peripheral, Antecubital, Left Updated:  08/16/19 2212     Blood Culture, Routine No Growth to date      No Growth to date    Blood culture #2 **CANNOT BE ORDERED STAT** [[id number]] Collected:  08/15/19 2045    Order Status:  Completed Specimen:  Blood from Peripheral, Antecubital, Left Updated:  08/16/19 2212     Blood Culture, Routine No Growth to date      No Growth to date    Respiratory Viral Panel by PCR [facility]; Nasal Swab [[id number]] Collected:  08/16/19 0620    Order Status:  Sent Specimen:  Respiratory Updated:  08/16/19 0691

## 2019-08-17 NOTE — ASSESSMENT & PLAN NOTE
On 8/17 he had a similar episode as previously with fever, tachycardia and nausea but de-saturated to 84% and was put on 3L NC and given duonebs. ABG was normal. Shortly afterwards patient desaturated to 87% while on 3L and was increased to 4.5L, then down to 3L. Repeat lactic acid increased to 2.5. BNP normal 59. Repeat CXR showed increased opacities with questionable pulmonary edema, CT chest confirmed that and suggested a possible inflammatory process. Pulm was consulted.    - Escalated abx from ceftriaxone/azithromycin to vancomycin/cefepime  - Changed duonebs from PRN to scheduled q4  - Pulm consulted and I spoke to them about the patient, awaiting recs  - Holding off on giving fluids as there is pulmonary edema on the CXR, but also holding off on diuretics as patient has atypical PNA (for which the treatment is fluids)  - Most recently sat 98% on 3L NC, afebrile  - Vital signs q4

## 2019-08-17 NOTE — NURSING
"1942: Pt observed w/ severe rigors at beginning of shift. Pt had just received dose of Norco prior to beginning of shift, pt states this had just started prior to this RN entering the room. Pt's temp taken, 98.4F, pt given blankets and heat pack, rigors resolved shortly after.   2011: Pt's family called , reporting pt needing antiemetic d/t pt currently vomiting. Pt given PRN PO zofran, asked pt if he had eaten prior to taking Norco earlier, pt states that he had not been eating all day. Explained to pt that n/v is common when taking PO opioids on empty stomach.  2224: Pt's family called  asking pt's temperature be taken. Pt's temperature and HR elevated, reddened areas around pt's face also noted. Page out to on call provider, pt declining prn tylenol at this time.  2250: On call provider at bedside  2300: Pt's temp and HR still elevated, pt also reporting some SOB, asking for neb tx stating "the inhaler doesn't work for me"  2315: New orders for one time dose of IV toradol, scopolamine patch, additional one time prn PO zofran, and one time duo-neb ordered.   2352: Toradol given, pt reporting increasing pain as well.  0016: Pt's temp elevated to 101.1, pt given prn tylenol and norco for pain, acetaminophen levels still under 3000, one time prn PO zofran given at this time as well for nausea prevention.  0108: Pt's temp and HR now WNL.  0154: Pt received prn duo-neb tx, pt reports resolution from SOB.   0400: VS remain stable, pt denies any SOB, remains on 3L O2 via NC.    "

## 2019-08-17 NOTE — ASSESSMENT & PLAN NOTE
Patient with cough, fevers, decreased PO intake, nausea, vomiting, generalized body aches for 6 days. CXR with interstitial and patchy opacities without focal consolidation consistent with atypical pneumonia. Mycoplasmae pneumoniae vs viral.  Likely due to immunosuppression in the setting of dupixent biologic.  Smoking history and asthma history also puts the patient at increased risk. Patient flu negative.     - Escalated to IV vanc/cefepime  - M. Pneumoniae PCR, viral respiratory panel and HIV pending  - Follow up blood cultures and tailor antibiotic therapy appropriately if positive, no growth after 2 days  - Pulm consulted  - See acute hypoxemic respiratory failure above

## 2019-08-17 NOTE — NURSING
11:26 MD arrived in room  MD will ordered ABT  Pt. Is on 3.5L NC.   Tylenol given for temp 100.2 axillary  Will continue to monitor

## 2019-08-17 NOTE — PROGRESS NOTES
[facility] Hospital Medicine  Progress Note    Patient Name: Fernie Rice  MRN: [id number]  Patient Class: IP- Inpatient   Admission Date: 8/15/2019  Length of Stay: 2 days  Attending Physician: Destiny Connelly MD  Primary Care Provider: Fernie Garcia MD    [facility] Medicine Team: [facility] Brooks Chavis DO    Subjective:     Principal Problem:Sepsis due to pneumonia        HPI:  Mr. Rice is a 19 yo male with history of atopic dermatitis, intermittent asthma, esophagitis and depression who presents with chief complaint of generalized body aches for 6 days. The aches occur mainly in his upper abdomen beneath his rib cage and upper back but have occurred elsewhere.  The pain is worse when he takes a deep breath and coughs, relieved by percocet taken once at home.  During the same time he has also had subjective fevers, chills, diaphoresis, decreased PO intake, occipital headaches, intermittent non-productive cough, and occasional shortness of breath related to pain. Beginning 2 days ago, he began to develop intermittent nausea and non-bloody, non-bilious vomiting. He had one episode of looser stool yesterday. He has not had congestion, runny nose, sore throat, rash, chest pain.  He said he has had one sick contact a few weeks ago with a friend who developed pneumonia.     In the ED, VS significant for patient febrile with Tm 102, tachycardic with , other VSS.  Labs were significant for WBC 17, UA positive for 11 RBC, Flu negative.  Chest xray obtained which revealed patchy and interstitial opacities bilaterally in mid and inferior lung zones concerning for atypical pneumonia. Otherwise there was no focal consolidation. He received doses of acetaminophen, ibuprofen and ketoralac for his pain with minimal improvement, 2L NS bolus, 1 dose of IV rochephin and 1 dose of azithromycin.     Overview/Hospital Course:  On 8/16 patient had 2 episodes of spiked fever, nausea and tachycardia that was  relieved by PRN tylenol and zofran, patient was breathing well and O2 sat remained normal on RA. Repeat CXR showed increased opacities and mediastinal LAD. On 8/17 he had a similar episode but de-saturated to 84% and was put on 3L NC and given duonebs. ABG was normal. Shortly afterwards patient desaturated to 87% while on 3L and was increased to 4L. Repeat CXR showed increased opacities with questionable pulmonary edema, CT chest confirmed that and suggested a possible inflammatory process. Pulm was consulted.    Interval History: Patient appeared improved in the morning with his main complaint being his back and chest wall pain. Throughout the morning, patient desaturated multiple times, during which he became febrile, had SOB, chills, facial flushing relieved by tylenol and duonebs.    Review of Systems   Constitutional: Positive for appetite change, chills, diaphoresis, fatigue and fever.   HENT: Negative for congestion, rhinorrhea and sinus pressure.    Eyes: Negative for redness and visual disturbance.   Respiratory: Positive for cough (nonproductive), chest tightness and shortness of breath. Negative for wheezing.    Cardiovascular: Negative for chest pain and palpitations.   Gastrointestinal: Positive for diarrhea (1 episode looser stools), nausea and vomiting. Negative for abdominal pain and constipation.   Genitourinary: Negative for difficulty urinating, dysuria and hematuria.   Musculoskeletal: Positive for myalgias. Negative for arthralgias.   Skin: Negative for pallor and rash.   Neurological: Positive for headaches. Negative for light-headedness.     Objective:     Vital Signs (Most Recent):  Temp: 98.9 °F (37.2 °C) (08/17/19 1325)  Pulse: 77 (08/17/19 1712)  Resp: 18 (08/17/19 1712)  BP: 131/81 (08/17/19 1651)  SpO2: 98 % (08/17/19 1712) Vital Signs (24h Range):  Temp:  [97.6 °F (36.4 °C)-101.6 °F (38.7 °C)] 98.9 °F (37.2 °C)  Pulse:  [] 77  Resp:  [12-32] 18  SpO2:  [88 %-98 %] 98 %  BP:  (125-155)/(71-81) 131/81     Weight: 75.8 kg (167 lb 1.7 oz)  Body mass index is 25.41 kg/m².  No intake or output data in the 24 hours ending 08/17/19 1813   Physical Exam   Constitutional: He is oriented to person, place, and time. He appears well-developed and well-nourished.   HENT:   Head: Normocephalic and atraumatic.   Nose: Nose normal.   Mouth/Throat: Oropharynx is clear and moist. No oropharyngeal exudate.   Eyes: Pupils are equal, round, and reactive to light. Conjunctivae are normal. Right eye exhibits no discharge. Left eye exhibits no discharge. No scleral icterus.   Neck: Normal range of motion. Neck supple. No JVD present.   Cardiovascular: Regular rhythm, normal heart sounds and intact distal pulses. Tachycardia present. Exam reveals no gallop and no friction rub.   No murmur heard.  Pulmonary/Chest: Effort normal. No respiratory distress. He has no wheezes. He has no rales.   On 3L, coarse breath sounds bilaterally   Abdominal: Soft. Bowel sounds are normal. He exhibits no distension. There is no tenderness.   Musculoskeletal: Normal range of motion. He exhibits no edema.   Lymphadenopathy:     He has no cervical adenopathy.   Neurological: He is alert and oriented to person, place, and time.   Skin: Skin is warm and dry. No rash noted.   Facial flushing   Psychiatric: He has a normal mood and affect. His behavior is normal.       Significant Labs: All pertinent labs within the past 24 hours have been reviewed.  Recent Results (from the past 24 hour(s))   CBC auto differential    Collection Time: 08/17/19 12:55 AM   Result Value Ref Range    WBC 13.05 (H) 3.90 - 12.70 K/uL    RBC 4.01 (L) 4.60 - 6.20 M/uL    Hemoglobin 11.6 (L) 14.0 - 18.0 g/dL    Hematocrit 34.7 (L) 40.0 - 54.0 %    Mean Corpuscular Volume 87 82 - 98 fL    Mean Corpuscular Hemoglobin 28.9 27.0 - 31.0 pg    Mean Corpuscular Hemoglobin Conc 33.4 32.0 - 36.0 g/dL    RDW 12.5 11.5 - 14.5 %    Platelets 355 (H) 150 - 350 K/uL    MPV  9.3 9.2 - 12.9 fL    Immature Granulocytes 0.8 (H) 0.0 - 0.5 %    Gran # (ANC) 11.8 (H) 1.8 - 7.7 K/uL    Immature Grans (Abs) 0.11 (H) 0.00 - 0.04 K/uL    Lymph # 0.7 (L) 1.0 - 4.8 K/uL    Mono # 0.2 (L) 0.3 - 1.0 K/uL    Eos # 0.3 0.0 - 0.5 K/uL    Baso # 0.01 0.00 - 0.20 K/uL    nRBC 0 0 /100 WBC    Gran% 90.5 (H) 38.0 - 73.0 %    Lymph% 5.4 (L) 18.0 - 48.0 %    Mono% 1.1 (L) 4.0 - 15.0 %    Eosinophil% 2.1 0.0 - 8.0 %    Basophil% 0.1 0.0 - 1.9 %    Differential Method Automated    Lactic acid, plasma    Collection Time: 08/17/19 12:55 AM   Result Value Ref Range    Lactate (Lactic Acid) 1.0 0.5 - 2.2 mmol/L   Blood culture    Collection Time: 08/17/19 12:55 AM   Result Value Ref Range    Blood Culture, Routine No Growth to date    Blood culture    Collection Time: 08/17/19 12:56 AM   Result Value Ref Range    Blood Culture, Routine No Growth to date    Comprehensive Metabolic Panel (CMP)    Collection Time: 08/17/19  3:57 AM   Result Value Ref Range    Sodium 138 136 - 145 mmol/L    Potassium 3.9 3.5 - 5.1 mmol/L    Chloride 99 95 - 110 mmol/L    CO2 28 23 - 29 mmol/L    Glucose 105 70 - 110 mg/dL    BUN, Bld 7 6 - 20 mg/dL    Creatinine 0.7 0.5 - 1.4 mg/dL    Calcium 9.7 8.7 - 10.5 mg/dL    Total Protein 7.1 6.0 - 8.4 g/dL    Albumin 2.5 (L) 3.5 - 5.2 g/dL    Total Bilirubin 0.9 0.1 - 1.0 mg/dL    Alkaline Phosphatase 88 55 - 135 U/L    AST 16 10 - 40 U/L    ALT 17 10 - 44 U/L    Anion Gap 11 8 - 16 mmol/L    eGFR if African American >60.0 >60 mL/min/1.73 m^2    eGFR if non African American >60.0 >60 mL/min/1.73 m^2   Magnesium    Collection Time: 08/17/19  3:57 AM   Result Value Ref Range    Magnesium 1.8 1.6 - 2.6 mg/dL   Phosphorus    Collection Time: 08/17/19  3:57 AM   Result Value Ref Range    Phosphorus 3.9 2.7 - 4.5 mg/dL   CBC with Automated Differential    Collection Time: 08/17/19  3:57 AM   Result Value Ref Range    WBC 13.79 (H) 3.90 - 12.70 K/uL    RBC 3.96 (L) 4.60 - 6.20 M/uL    Hemoglobin 11.7  (L) 14.0 - 18.0 g/dL    Hematocrit 35.3 (L) 40.0 - 54.0 %    Mean Corpuscular Volume 89 82 - 98 fL    Mean Corpuscular Hemoglobin 29.5 27.0 - 31.0 pg    Mean Corpuscular Hemoglobin Conc 33.1 32.0 - 36.0 g/dL    RDW 12.6 11.5 - 14.5 %    Platelets 365 (H) 150 - 350 K/uL    MPV 9.9 9.2 - 12.9 fL    Immature Granulocytes 0.8 (H) 0.0 - 0.5 %    Gran # (ANC) 12.2 (H) 1.8 - 7.7 K/uL    Immature Grans (Abs) 0.11 (H) 0.00 - 0.04 K/uL    Lymph # 0.9 (L) 1.0 - 4.8 K/uL    Mono # 0.2 (L) 0.3 - 1.0 K/uL    Eos # 0.5 0.0 - 0.5 K/uL    Baso # 0.02 0.00 - 0.20 K/uL    nRBC 0 0 /100 WBC    Gran% 88.3 (H) 38.0 - 73.0 %    Lymph% 6.3 (L) 18.0 - 48.0 %    Mono% 1.2 (L) 4.0 - 15.0 %    Eosinophil% 3.3 0.0 - 8.0 %    Basophil% 0.1 0.0 - 1.9 %    Differential Method Automated    Lactic acid, plasma    Collection Time: 08/17/19 12:07 PM   Result Value Ref Range    Lactate (Lactic Acid) 2.5 (H) 0.5 - 2.2 mmol/L   Brain natriuretic peptide    Collection Time: 08/17/19  1:42 PM   Result Value Ref Range    BNP 59 0 - 99 pg/mL       Significant Imaging: I have reviewed all pertinent imaging results/findings within the past 24 hours.      Assessment/Plan:      * Sepsis due to pneumonia  On admission patient tachycardic, febrile with fever 102, and elevated white count, SIRS 3/4. Given 2L IVF bolus and IV abx.    - Follow CBC and CMP daily, follow vitals q4  - See atypical pneumonia above    Acute hypoxemic respiratory failure  On 8/17 he had a similar episode as previously with fever, tachycardia and nausea but de-saturated to 84% and was put on 3L NC and given duonebs. ABG was normal. Shortly afterwards patient desaturated to 87% while on 3L and was increased to 4.5L, then down to 3L. Repeat lactic acid increased to 2.5. BNP normal 59. Repeat CXR showed increased opacities with questionable pulmonary edema, CT chest confirmed that and suggested a possible inflammatory process. Pulm was consulted.    - Escalated abx from  ceftriaxone/azithromycin to vancomycin/cefepime  - Changed duonebs from PRN to scheduled q4  - Pulm consulted and I spoke to them about the patient, awaiting recs  - Holding off on giving fluids as there is pulmonary edema on the CXR, but also holding off on diuretics as patient has atypical PNA (for which the treatment is fluids)  - Most recently sat 98% on 3L NC, afebrile  - Vital signs q4    Acute midline thoracic back pain  Likely in the setting of lung infection causing pleural irritation given pain worse with coughing and breathing.    - Has PRN norco 5 and 10    Atypical pneumonia  Patient with cough, fevers, decreased PO intake, nausea, vomiting, generalized body aches for 6 days. CXR with interstitial and patchy opacities without focal consolidation consistent with atypical pneumonia. Mycoplasmae pneumoniae vs viral.  Likely due to immunosuppression in the setting of dupixent biologic.  Smoking history and asthma history also puts the patient at increased risk. Patient flu negative.     - Escalated to IV vanc/cefepime  - M. Pneumoniae PCR, viral respiratory panel and HIV pending  - Follow up blood cultures and tailor antibiotic therapy appropriately if positive, no growth after 2 days  - Pulm consulted  - See acute hypoxemic respiratory failure above          VTE Risk Mitigation (From admission, onward)        Ordered     IP VTE LOW RISK PATIENT  Once      08/15/19 8140     Place sequential compression device  Until discontinued      08/15/19 6461                Brooks Chavis DO  Department of Hospital Medicine   [facility]

## 2019-08-17 NOTE — PLAN OF CARE
Problem: Fall Injury Risk  Goal: Absence of Fall and Fall-Related Injury  Outcome: Ongoing (interventions implemented as appropriate)  Meds given as ordered tolerated well. PRN pain meds given as needed. Nausea meds given as needed. Vitals stable. Safety maintained. Will continue to monitor.

## 2019-08-17 NOTE — ASSESSMENT & PLAN NOTE
On admission patient tachycardic, febrile with fever 102, and elevated white count, SIRS 3/4. Given 2L IVF bolus and IV abx.    - Follow CBC and CMP daily, follow vitals q4  - See atypical pneumonia above

## 2019-08-17 NOTE — CARE UPDATE
"RAPID RESPONSE NURSE PROACTIVE ROUNDING NOTE     Time of Visit: 1156    Admit Date: 8/15/2019  LOS: 2  Code Status: Full Code   Date of Visit: 2019  : 2000  Age: 18 y.o.  Sex: male  Race: White  Bed: 4/[facility] A:   MRN: [id number]  Was the patient discharged from an ICU this admission? no   Was the patient discharged from a PACU within last 24 hours?  no  Did the patient receive conscious sedation/general anesthesia in last 24 hours?  no  Was the patient in the ED within the past 24 hours?  yes  Was the patient started on NIPPV within the past 24 hours?  no  Attending Physician: Destiny Connelly MD  Primary Service: [facility]    ASSESSMENT     Diagnosis: Sepsis due to pneumonia    Abnormal Vital Signs: BP (!) 146/76   Pulse (!) 129   Temp (!) 101.6 °F (38.7 °C) (Oral)   Resp (!) 32   Ht 5' 8" (1.727 m)   Wt 75.8 kg (167 lb 1.7 oz)   SpO2 (!) 92%   BMI 25.41 kg/m²      Clinical Issues: Respiratory    Patient  has a past medical history of ADHD (attention deficit hyperactivity disorder), Asthma, Esophagitis, Food impaction of esophagus, Multiple food allergies, and Vernal keratoconjunctivitis.    Called by bedside RN to evaluate patient. Per bedside RN, patient has fever, dyspnea, tachypnea, and O2 sat 90%. Upon arrival to the room, patient is lying in bed, appears uncomfortable, converses appropriately, states, "I feel like crap." Patient received neb treatment approximately an hour ago, states he feels no relief from this. Temp 101.6, , O2 sat 94% on 3.5L per NC.       INTERVENTIONS/ RECOMMENDATIONS     New antibiotics, Chest Xray, CT of Chest ordered. Continue current PoC, ensure patient receives nebs and tylenol prn. Will continue to follow.    Discussed plan of care with RNConsuelo.    PHYSICIAN ESCALATION     Yes/No  no    Disposition: Remain in room 624.    FOLLOW-UP     Call back the Rapid Response Nurse, Lui Pulido RN at [phone] for additional questions or concerns.          "

## 2019-08-17 NOTE — HOSPITAL COURSE
On 8/16 patient had 2 episodes of spiked fever, nausea and tachycardia that was relieved by PRN tylenol and zofran, patient was breathing well and O2 sat remained normal on RA. Repeat CXR showed increased opacities and mediastinal LAD. On 8/17 he had a similar episode but de-saturated to 84% and was put on 3L NC and given duonebs. ABG was normal. Shortly afterwards patient desaturated to 87% while on 3L and was increased to 4L. Repeat CXR showed increased opacities with questionable pulmonary edema, CT chest confirmed that and suggested a possible inflammatory process. Pulm was consulted for assistance. Patient's respiratory status improving with scheduled duonebs, so likely just atypical PNA compounded by his asthma. HIV negative. Respiratory and blood cultures negative. Respiratory viral panel and Mycoplasma pending. ALEX negative and ANCA per pulm pending. Patient weaned off supplemental O2 and remained vitally stable on room air. Discharged with f/u with PCP, advised patient to switch from pediatrician to internist.

## 2019-08-17 NOTE — PROGRESS NOTES
Pharmacist Renal Dose Adjustment Note    Fernie Rice is a 18 y.o. male being treated with the medication cefepime    Patient Data:    Vital Signs (Most Recent):  Temp: 98.9 °F (37.2 °C) (08/17/19 1325)  Pulse: 103 (08/17/19 1325)  Resp: 18 (08/17/19 1325)  BP: 129/75 (08/17/19 1325)  SpO2: (!) 94 % (08/17/19 1325)   Vital Signs (72h Range):  Temp:  [97.3 °F (36.3 °C)-102.9 °F (39.4 °C)]   Pulse:  []   Resp:  [12-32]   BP: (123-157)/(66-87)   SpO2:  [87 %-98 %]      Recent Labs   Lab 08/15/19  1653 08/16/19  0524 08/17/19  0357   CREATININE 0.8 0.7 0.7     Serum creatinine: 0.7 mg/dL 08/17/19 0357  Estimated creatinine clearance: 162.9 mL/min    Cefepime 2g IV Q12H will be changed to cefepime 2g IV Q8H     Pharmacist's Name: Mikayla Araujo, PharmD, BCPS  Pharmacist's Extension: [phone]

## 2019-08-17 NOTE — PLAN OF CARE
Problem: Adult Inpatient Plan of Care  Goal: Plan of Care Review  Outcome: Outcome(s) achieved Date Met: 08/17/19 08/17/19 4472   Plan of Care Review   Plan of Care Reviewed With patient;family   Progress improving

## 2019-08-18 LAB
ALBUMIN SERPL BCP-MCNC: 2.4 G/DL (ref 3.5–5.2)
ALP SERPL-CCNC: 79 U/L (ref 55–135)
ALT SERPL W/O P-5'-P-CCNC: 17 U/L (ref 10–44)
ANION GAP SERPL CALC-SCNC: 8 MMOL/L (ref 8–16)
AST SERPL-CCNC: 21 U/L (ref 10–40)
BASOPHILS # BLD AUTO: 0.01 K/UL (ref 0–0.2)
BASOPHILS NFR BLD: 0.1 % (ref 0–1.9)
BILIRUB SERPL-MCNC: 0.3 MG/DL (ref 0.1–1)
BUN SERPL-MCNC: 11 MG/DL (ref 6–20)
CALCIUM SERPL-MCNC: 9.9 MG/DL (ref 8.7–10.5)
CHLORIDE SERPL-SCNC: 100 MMOL/L (ref 95–110)
CO2 SERPL-SCNC: 30 MMOL/L (ref 23–29)
CREAT SERPL-MCNC: 0.7 MG/DL (ref 0.5–1.4)
DIFFERENTIAL METHOD: ABNORMAL
EOSINOPHIL # BLD AUTO: 0 K/UL (ref 0–0.5)
EOSINOPHIL NFR BLD: 0 % (ref 0–8)
ERYTHROCYTE [DISTWIDTH] IN BLOOD BY AUTOMATED COUNT: 12.7 % (ref 11.5–14.5)
EST. GFR  (AFRICAN AMERICAN): >60 ML/MIN/1.73 M^2
EST. GFR  (NON AFRICAN AMERICAN): >60 ML/MIN/1.73 M^2
GLUCOSE SERPL-MCNC: 145 MG/DL (ref 70–110)
HCT VFR BLD AUTO: 33.7 % (ref 40–54)
HGB BLD-MCNC: 11.2 G/DL (ref 14–18)
IMM GRANULOCYTES # BLD AUTO: 0.07 K/UL (ref 0–0.04)
IMM GRANULOCYTES NFR BLD AUTO: 0.6 % (ref 0–0.5)
LYMPHOCYTES # BLD AUTO: 0.3 K/UL (ref 1–4.8)
LYMPHOCYTES NFR BLD: 2.1 % (ref 18–48)
MAGNESIUM SERPL-MCNC: 2.1 MG/DL (ref 1.6–2.6)
MCH RBC QN AUTO: 29.2 PG (ref 27–31)
MCHC RBC AUTO-ENTMCNC: 33.2 G/DL (ref 32–36)
MCV RBC AUTO: 88 FL (ref 82–98)
MONOCYTES # BLD AUTO: 0.1 K/UL (ref 0.3–1)
MONOCYTES NFR BLD: 1.1 % (ref 4–15)
NEUTROPHILS # BLD AUTO: 11.9 K/UL (ref 1.8–7.7)
NEUTROPHILS NFR BLD: 96.1 % (ref 38–73)
NRBC BLD-RTO: 0 /100 WBC
PHOSPHATE SERPL-MCNC: 2.3 MG/DL (ref 2.7–4.5)
PLATELET # BLD AUTO: 420 K/UL (ref 150–350)
PMV BLD AUTO: 9.4 FL (ref 9.2–12.9)
POTASSIUM SERPL-SCNC: 4.2 MMOL/L (ref 3.5–5.1)
PROT SERPL-MCNC: 7.2 G/DL (ref 6–8.4)
RBC # BLD AUTO: 3.84 M/UL (ref 4.6–6.2)
SODIUM SERPL-SCNC: 138 MMOL/L (ref 136–145)
WBC # BLD AUTO: 12.33 K/UL (ref 3.9–12.7)

## 2019-08-18 PROCEDURE — 84100 ASSAY OF PHOSPHORUS: CPT

## 2019-08-18 PROCEDURE — 63600175 PHARM REV CODE 636 W HCPCS: Performed by: INTERNAL MEDICINE

## 2019-08-18 PROCEDURE — 85025 COMPLETE CBC W/AUTO DIFF WBC: CPT

## 2019-08-18 PROCEDURE — 99900035 HC TECH TIME PER 15 MIN (STAT)

## 2019-08-18 PROCEDURE — 99223 PR INITIAL HOSPITAL CARE,LEVL III: ICD-10-PCS | Mod: ,,, | Performed by: EMERGENCY MEDICINE

## 2019-08-18 PROCEDURE — 27000221 HC OXYGEN, UP TO 24 HOURS

## 2019-08-18 PROCEDURE — 36415 COLL VENOUS BLD VENIPUNCTURE: CPT

## 2019-08-18 PROCEDURE — 86038 ANTINUCLEAR ANTIBODIES: CPT

## 2019-08-18 PROCEDURE — 63600175 PHARM REV CODE 636 W HCPCS: Performed by: HOSPITALIST

## 2019-08-18 PROCEDURE — 94761 N-INVAS EAR/PLS OXIMETRY MLT: CPT

## 2019-08-18 PROCEDURE — 86255 FLUORESCENT ANTIBODY SCREEN: CPT

## 2019-08-18 PROCEDURE — 87070 CULTURE OTHR SPECIMN AEROBIC: CPT

## 2019-08-18 PROCEDURE — 99223 1ST HOSP IP/OBS HIGH 75: CPT | Mod: ,,, | Performed by: EMERGENCY MEDICINE

## 2019-08-18 PROCEDURE — 83735 ASSAY OF MAGNESIUM: CPT

## 2019-08-18 PROCEDURE — 94640 AIRWAY INHALATION TREATMENT: CPT

## 2019-08-18 PROCEDURE — 25000242 PHARM REV CODE 250 ALT 637 W/ HCPCS: Performed by: STUDENT IN AN ORGANIZED HEALTH CARE EDUCATION/TRAINING PROGRAM

## 2019-08-18 PROCEDURE — 80053 COMPREHEN METABOLIC PANEL: CPT

## 2019-08-18 PROCEDURE — 99233 SBSQ HOSP IP/OBS HIGH 50: CPT | Mod: ,,, | Performed by: HOSPITALIST

## 2019-08-18 PROCEDURE — 11000001 HC ACUTE MED/SURG PRIVATE ROOM

## 2019-08-18 PROCEDURE — 87205 SMEAR GRAM STAIN: CPT

## 2019-08-18 PROCEDURE — 99233 PR SUBSEQUENT HOSPITAL CARE,LEVL III: ICD-10-PCS | Mod: ,,, | Performed by: HOSPITALIST

## 2019-08-18 PROCEDURE — 25000003 PHARM REV CODE 250: Performed by: STUDENT IN AN ORGANIZED HEALTH CARE EDUCATION/TRAINING PROGRAM

## 2019-08-18 RX ADMIN — CEFEPIME 2 G: 2 INJECTION, POWDER, FOR SOLUTION INTRAVENOUS at 04:08

## 2019-08-18 RX ADMIN — CETIRIZINE HYDROCHLORIDE 10 MG: 10 TABLET, FILM COATED ORAL at 08:08

## 2019-08-18 RX ADMIN — IPRATROPIUM BROMIDE AND ALBUTEROL SULFATE 3 ML: .5; 3 SOLUTION RESPIRATORY (INHALATION) at 07:08

## 2019-08-18 RX ADMIN — CEFEPIME 2 G: 2 INJECTION, POWDER, FOR SOLUTION INTRAVENOUS at 01:08

## 2019-08-18 RX ADMIN — ONDANSETRON 4 MG: 4 TABLET, ORALLY DISINTEGRATING ORAL at 08:08

## 2019-08-18 RX ADMIN — VANCOMYCIN HYDROCHLORIDE 1250 MG: 1.25 INJECTION, POWDER, LYOPHILIZED, FOR SOLUTION INTRAVENOUS at 01:08

## 2019-08-18 RX ADMIN — IPRATROPIUM BROMIDE AND ALBUTEROL SULFATE 3 ML: .5; 3 SOLUTION RESPIRATORY (INHALATION) at 03:08

## 2019-08-18 RX ADMIN — IPRATROPIUM BROMIDE AND ALBUTEROL SULFATE 3 ML: .5; 3 SOLUTION RESPIRATORY (INHALATION) at 04:08

## 2019-08-18 RX ADMIN — IPRATROPIUM BROMIDE AND ALBUTEROL SULFATE 3 ML: .5; 3 SOLUTION RESPIRATORY (INHALATION) at 11:08

## 2019-08-18 RX ADMIN — PREDNISONE 50 MG: 20 TABLET ORAL at 01:08

## 2019-08-18 RX ADMIN — VANCOMYCIN HYDROCHLORIDE 1250 MG: 1.25 INJECTION, POWDER, LYOPHILIZED, FOR SOLUTION INTRAVENOUS at 12:08

## 2019-08-18 RX ADMIN — HYDROCODONE BITARTRATE AND ACETAMINOPHEN 1 TABLET: 10; 325 TABLET ORAL at 08:08

## 2019-08-18 RX ADMIN — RAMELTEON 8 MG: 8 TABLET ORAL at 08:08

## 2019-08-18 RX ADMIN — CEFEPIME 2 G: 2 INJECTION, POWDER, FOR SOLUTION INTRAVENOUS at 08:08

## 2019-08-18 RX ADMIN — VILAZODONE HYDROCHLORIDE 40 MG: 10 TABLET ORAL at 08:08

## 2019-08-18 NOTE — PLAN OF CARE
Problem: Fall Injury Risk  Goal: Absence of Fall and Fall-Related Injury  Outcome: Ongoing (interventions implemented as appropriate)  Meds given as ordered tolerated well. PRN pain meds given as needed. No signs or symptoms of distress/discomfort noted. ABT therapy. Vitals stable. Safety maintained.Will continue to monitor.

## 2019-08-18 NOTE — CONSULTS
[facility]  Pulmonology  Consult Note    Patient Name: Fernie Rice  MRN: [id number]  Admission Date: 8/15/2019  Hospital Length of Stay: 3 days  Code Status: Full Code  Attending Physician: Destiny Connelly MD  Primary Care Provider: Fernie Garcia MD   Principal Problem: Sepsis due to pneumonia    Inpatient consult to Pulmonology  Consult performed by: Neto Collins MD  Consult ordered by: Brooks Chavis DO        Subjective:     HPI:  Mr. Rice is a 17 yo male with history of atopic dermatitis, intermittent asthma, esophagitis and depression who presents with chief complaint of generalized body aches for 6 days, fever, chills, cough/sob. He states that he thought he had the flu. He actually went to urgent care who tested him and said he didn't have the flu and told him it was likely viral and sent him home. He denies any sick contacts. He presented to the ED where he was found to be febrile with temp 102, tachycardic, had leukocytosis of 17. Chest xray obtained which revealed patchy and interstitial opacities bilaterally in mid and inferior lung zones concerning for atypical pneumonia. Otherwise there was no focal consolidation. He was started on broad spectrum abx and admitted. While on the floor, pt started having increasing oxygen requirements. CT chest was done showing b/l opacities diffuse in both lung fields. Pulmonary was thus consulted for further recommendations. 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 He denies any bet birds. Denies any recent travel. Denies any sick contacts.     Past Medical History:   Diagnosis Date    ADHD (attention deficit hyperactivity disorder)     Asthma     Esophagitis     Food impaction of esophagus     Multiple food allergies     Vernal keratoconjunctivitis        Past Surgical History:   Procedure Laterality Date    ADENOIDECTOMY      ESOPHAGOGASTRODUODENOSCOPY  (EGD) N/A 11/22/2014    Performed by Aman Hayes MD at [facility] OR [address] [place] [address]       Review of patient's allergies indicates:   Allergen Reactions    Fish containing products Other (See Comments)    Peanut Anaphylaxis    Amoxicillin Hives     Tolerates ceftriaxone    Bactrim [sulfamethoxazole-trimethoprim] Hives    Corn containing products Other (See Comments)       Family History     None        Tobacco Use    Smoking status: Current Some Day Smoker    Smokeless tobacco: Never Used   Substance and Sexual Activity    Alcohol use: No    Drug use: Yes     Types: Marijuana    Sexual activity: Yes     Partners: Female     Birth control/protection: Condom     Comment: quit 2 weeks ago.         Review of Systems   Constitutional: Positive for appetite change, chills, diaphoresis, fatigue and fever. Negative for activity change and unexpected weight change.   HENT: Negative.    Eyes: Negative.    Respiratory: Positive for cough and shortness of breath. Negative for choking, wheezing and stridor.    Cardiovascular: Negative.    Gastrointestinal: Positive for nausea. Negative for blood in stool, constipation and diarrhea.   Endocrine: Negative.    Genitourinary: Negative.    Musculoskeletal: Negative.    Skin: Negative.    Allergic/Immunologic: Negative.    Neurological: Negative.    Hematological: Negative.    Psychiatric/Behavioral: Negative.      Objective:     Vital Signs (Most Recent):  Temp: 96.5 °F (35.8 °C) (08/18/19 1635)  Pulse: 92 (08/18/19 1635)  Resp: 18 (08/18/19 1635)  BP: (!) 143/71 (08/18/19 1635)  SpO2: 97 % (08/18/19 1635) Vital Signs (24h Range):  Temp:  [96 °F (35.6 °C)-99.4 °F (37.4 °C)] 96.5 °F (35.8 °C)  Pulse:  [] 92  Resp:  [12-20] 18  SpO2:  [92 %-99 %] 97 %  BP: (119-143)/(65-77) 143/71     Weight: 75.8 kg (167 lb 1.7 oz)  Body mass index is 25.41 kg/m².    No intake or output data in the 24 hours ending 08/18/19 1849    Physical Exam   Constitutional: He is oriented to person,  place, and time. He appears well-developed and well-nourished. No distress.   HENT:   Head: Normocephalic and atraumatic.   Right Ear: External ear normal.   Left Ear: External ear normal.   Eyes: Pupils are equal, round, and reactive to light. EOM are normal. Right eye exhibits no discharge. Left eye exhibits no discharge.   Neck: Normal range of motion. Neck supple. No tracheal deviation present. No thyromegaly present.   Cardiovascular: Normal rate, regular rhythm and normal heart sounds.   No murmur heard.  Pulmonary/Chest: Effort normal and breath sounds normal. No stridor. No respiratory distress. He has no wheezes.   Abdominal: Soft. Bowel sounds are normal. He exhibits no distension. There is no tenderness.   Musculoskeletal: Normal range of motion. He exhibits no edema or deformity.   Neurological: He is alert and oriented to person, place, and time.   Skin: Skin is warm. Capillary refill takes less than 2 seconds. He is not diaphoretic. No erythema.   Psychiatric: He has a normal mood and affect. His behavior is normal.       Vents:  Oxygen Concentration (%): 32 (08/18/19 0323)    Lines/Drains/Airways     Peripheral Intravenous Line                 Peripheral IV - Single Lumen 08/15/19 1641 20 G Left Antecubital 3 days                Significant Labs:    CBC/Anemia Profile:  Recent Labs   Lab 08/17/19  0055 08/17/19  0357 08/18/19  0502   WBC 13.05* 13.79* 12.33   HGB 11.6* 11.7* 11.2*   HCT 34.7* 35.3* 33.7*   * 365* 420*   MCV 87 89 88   RDW 12.5 12.6 12.7        Chemistries:  Recent Labs   Lab 08/17/19  0357 08/18/19  0502    138   K 3.9 4.2   CL 99 100   CO2 28 30*   BUN 7 11   CREATININE 0.7 0.7   CALCIUM 9.7 9.9   ALBUMIN 2.5* 2.4*   PROT 7.1 7.2   BILITOT 0.9 0.3   ALKPHOS 88 79   ALT 17 17   AST 16 21   MG 1.8 2.1   PHOS 3.9 2.3*       BMP:   Recent Labs   Lab 08/18/19  0502   *      K 4.2      CO2 30*   BUN 11   CREATININE 0.7   CALCIUM 9.9   MG 2.1     All  pertinent labs within the past 24 hours have been reviewed.    Significant Imaging:   I have reviewed and interpreted all pertinent imaging results/findings within the past 24 hours.    Assessment/Plan:     Acute hypoxemic respiratory failure  - recommend goal of spO2 >92%. Recommend weaning as able    Pneumonia due to infectious organism  Pt presented with fever, leukocytosis and imaging consistent with pneumonia. CT showed b/l infiltrated throughout. Initially we were thinking this could be acute eosinophilic pneumonia due to vaping but he's been vaping for 2 years (probably less likely). Could be viral pneumonia vs bacterial CAP vs vasculitis. We started steroids yesterday to help with inflammatory process. He states that he is feeling better today.  - recommend continuing prednisone 50mg for 5 total days  - would suggest treating him for CAP. So far nothing has grown from his sputum culture (sputum culture for bacterial infection however was just collected yesterday, which is after 2-3 days of antibiotics). Could consider changing to augmentin 875mg bid for 7 day total course  - recommend repeat CXR in 6 weeks to demonstrate radiographic clearance. He can come to our clinic for that or see his PCP.  - ok to continue duonebs (pt does have h/o asthma, but not actively wheezing)          Thank you for your consult. I will sign off. Please contact us if you have any additional questions.     Neto Collins MD  Pulmonology Fellow  [facility]

## 2019-08-18 NOTE — ASSESSMENT & PLAN NOTE
Patient with cough, fevers, decreased PO intake, nausea, vomiting, generalized body aches for 6 days. CXR with interstitial and patchy opacities without focal consolidation consistent with atypical pneumonia. Mycoplasmae pneumoniae vs viral.  Likely due to immunosuppression in the setting of dupixent biologic.  Smoking history and asthma history also puts the patient at increased risk. Patient flu negative.     - Escalated to IV vanc/cefepime  - M. Pneumoniae PCR, viral respiratory panel, HIV, respiratory culture pending  - Follow up blood cultures and tailor antibiotic therapy appropriately if positive  - Pulm consulted   - See acute hypoxemic respiratory failure above

## 2019-08-18 NOTE — ASSESSMENT & PLAN NOTE
Pt presented with fever, leukocytosis and imaging consistent with pneumonia. CT showed b/l infiltrated throughout. Initially we were thinking this could be acute eosinophilic pneumonia due to vaping but he's been vaping for 2 years (probably less likely). Could be viral pneumonia vs bacterial CAP vs vasculitis. We started steroids yesterday to help with inflammatory process. He states that he is feeling better today.  - recommend continuing prednisone 50mg for 5 total days  - would suggest treating him for CAP. So far nothing has grown from his sputum culture (sputum culture for bacterial infection however was just collected yesterday, which is after 2-3 days of antibiotics). Could consider changing to augmentin 875mg bid for 7 day total course  - recommend repeat CXR in 6 weeks to demonstrate radiographic clearance. He can come to our clinic for that or see his PCP.  - ok to continue duonebs (pt does have h/o asthma, but not actively wheezing)

## 2019-08-18 NOTE — PLAN OF CARE
Problem: Adult Inpatient Plan of Care  Goal: Plan of Care Review  Outcome: Ongoing (interventions implemented as appropriate)     08/18/19 7742   Plan of Care Review   Plan of Care Reviewed With patient;family   Progress improving   Pt A+Ox4, calm and cooperative throughout shift. Afebrile throughout most of shift, highest 99.4 at beginning of shift, HR and SpO2 WNL. Pt states feeling much better, improved appetite and decreased pain. Pt denies n/v throughout shift, recommended continuing taking zofran w/ PO Norco when able. IV cefepime and vancomycin. Safety maintained throughout shift.

## 2019-08-18 NOTE — SUBJECTIVE & OBJECTIVE
Interval History: Pulm was consulted for assistance. Pending HIV, respiratory culture and blood culture. Patient's respiratory status improving with scheduled duonebs.     Review of Systems   Constitutional: Negative for fever.   HENT: Negative for congestion, rhinorrhea and sinus pressure.    Eyes: Negative for redness and visual disturbance.   Respiratory: Positive for cough (nonproductive) and shortness of breath. Negative for wheezing.    Cardiovascular: Negative for chest pain and palpitations.   Gastrointestinal: Negative for abdominal pain and constipation.   Genitourinary: Negative for difficulty urinating, dysuria and hematuria.   Musculoskeletal: Negative for arthralgias.   Skin: Negative for pallor and rash.   Neurological: Negative for light-headedness.     Objective:     Vital Signs (Most Recent):  Temp: 97.3 °F (36.3 °C) (08/18/19 1147)  Pulse: 72 (08/18/19 1147)  Resp: 18 (08/18/19 1147)  BP: 127/77 (08/18/19 1147)  SpO2: 99 % (08/18/19 1147) Vital Signs (24h Range):  Temp:  [96 °F (35.6 °C)-99.4 °F (37.4 °C)] 97.3 °F (36.3 °C)  Pulse:  [] 72  Resp:  [12-20] 18  SpO2:  [92 %-99 %] 99 %  BP: (119-131)/(65-81) 127/77     Weight: 75.8 kg (167 lb 1.7 oz)  Body mass index is 25.41 kg/m².  No intake or output data in the 24 hours ending 08/18/19 1404   Physical Exam   Constitutional: He is oriented to person, place, and time. He appears well-developed and well-nourished.   HENT:   Head: Normocephalic and atraumatic.   Nose: Nose normal.   Mouth/Throat: Oropharynx is clear and moist. No oropharyngeal exudate.   Eyes: Pupils are equal, round, and reactive to light. Conjunctivae are normal. Right eye exhibits no discharge. Left eye exhibits no discharge. No scleral icterus.   Neck: Normal range of motion. Neck supple. No JVD present.   Cardiovascular: Regular rhythm, normal heart sounds and intact distal pulses. Exam reveals no gallop and no friction rub.   No murmur heard.  Pulmonary/Chest: Effort  normal and breath sounds normal. No respiratory distress. He has no wheezes. He has no rales.   On 3L   Abdominal: Soft. Bowel sounds are normal. He exhibits no distension. There is no tenderness.   Musculoskeletal: Normal range of motion. He exhibits no edema.   Lymphadenopathy:     He has no cervical adenopathy.   Neurological: He is alert and oriented to person, place, and time.   Skin: Skin is warm and dry.   Psychiatric: He has a normal mood and affect. His behavior is normal.     Significant Labs:   CBC:   Recent Labs   Lab 08/17/19  0055 08/17/19  0357 08/18/19  0502   WBC 13.05* 13.79* 12.33   HGB 11.6* 11.7* 11.2*   HCT 34.7* 35.3* 33.7*   * 365* 420*     CMP:   Recent Labs   Lab 08/17/19  0357 08/18/19  0502    138   K 3.9 4.2   CL 99 100   CO2 28 30*    145*   BUN 7 11   CREATININE 0.7 0.7   CALCIUM 9.7 9.9   PROT 7.1 7.2   ALBUMIN 2.5* 2.4*   BILITOT 0.9 0.3   ALKPHOS 88 79   AST 16 21   ALT 17 17   ANIONGAP 11 8   EGFRNONAA >60.0 >60.0     All pertinent labs within the past 24 hours have been reviewed.    Significant Imaging: I have reviewed all pertinent imaging results/findings within the past 24 hours.   CT Chest Without Contrast:  Impression       Scattered ground-glass opacities as well as focal patchy opacities with associated minimal bronchiectasis at the lower lung zones as above.  Considerations remain infectious process such as atypical organism, inflammatory etiology, as well as pulmonary edema.    Mediastinal lymphadenopathy including enlarged azygos lymph node.  These are likely reactive in etiology, favoring overall infectious process.    Electronically signed by resident: Teo Stevenson  Date: 08/17/2019  Time: 15:05    Electronically signed by: Mary Anne Barrera MD  Date: 08/17/2019  Time: 15:47     CXR:  FINDINGS:  Patient is slightly rotated.  Cardiomediastinal silhouette is midline and similar to prior.  Patchy heterogeneous opacities are again identified with a  perihilar and basilar distribution, slightly increased from 08/16/2019 exam compatible with pneumonia as described in the clinical history.  Continued widening of the right paratracheal stripe which may represent mediastinal lymph node enlargement.  No pleural effusion or pneumothorax.  Trachea is midline and patent.  Osseous structures are intact.      Impression       Interval slight worsening of suspected atypical pneumonia, as above, when compared to 1 day prior.      Electronically signed by: Nelson Wright MD  Date: 08/17/2019  Time: 12:50

## 2019-08-18 NOTE — HPI
Mr. Rice is a 17 yo male with history of atopic dermatitis, intermittent asthma, esophagitis and depression who presents with chief complaint of generalized body aches for 6 days, fever, chills, cough/sob. He states that he thought he had the flu. He actually went to urgent care who tested him and said he didn't have the flu and told him it was likely viral and sent him home. He denies any sick contacts. He presented to the ED where he was found to be febrile with temp 102, tachycardic, had leukocytosis of 17. Chest xray obtained which revealed patchy and interstitial opacities bilaterally in mid and inferior lung zones concerning for atypical pneumonia. Otherwise there was no focal consolidation. He was started on broad spectrum abx and admitted. While on the floor, pt started having increasing oxygen requirements. CT chest was done showing b/l opacities diffuse in both lung fields. Pulmonary was thus consulted for further recommendations. 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 He denies any bet birds. Denies any recent travel. Denies any sick contacts.

## 2019-08-18 NOTE — PROGRESS NOTES
[facility] Hospital Medicine  Progress Note    Patient Name: Fernie Rice  MRN: [id number]  Patient Class: IP- Inpatient   Admission Date: 8/15/2019  Length of Stay: 3 days  Attending Physician: Destiny Connelly MD  Primary Care Provider: Fernie Garcia MD    [facility] Medicine Team: [facility] HOSP MED 2 Khurram Elizondo MD    Subjective:     Principal Problem:Sepsis due to pneumonia        HPI:  Mr. Rice is a 17 yo male with history of atopic dermatitis, intermittent asthma, esophagitis and depression who presents with chief complaint of generalized body aches for 6 days. The aches occur mainly in his upper abdomen beneath his rib cage and upper back but have occurred elsewhere.  The pain is worse when he takes a deep breath and coughs, relieved by percocet taken once at home.  During the same time he has also had subjective fevers, chills, diaphoresis, decreased PO intake, occipital headaches, intermittent non-productive cough, and occasional shortness of breath related to pain. Beginning 2 days ago, he began to develop intermittent nausea and non-bloody, non-bilious vomiting. He had one episode of looser stool yesterday. He has not had congestion, runny nose, sore throat, rash, chest pain.  He said he has had one sick contact a few weeks ago with a friend who developed pneumonia.     In the ED, VS significant for patient febrile with Tm 102, tachycardic with , other VSS.  Labs were significant for WBC 17, UA positive for 11 RBC, Flu negative.  Chest xray obtained which revealed patchy and interstitial opacities bilaterally in mid and inferior lung zones concerning for atypical pneumonia. Otherwise there was no focal consolidation. He received doses of acetaminophen, ibuprofen and ketoralac for his pain with minimal improvement, 2L NS bolus, 1 dose of IV rochephin and 1 dose of azithromycin.     Overview/Hospital Course:  On 8/16 patient had 2 episodes of spiked fever, nausea and tachycardia that was  relieved by PRN tylenol and zofran, patient was breathing well and O2 sat remained normal on RA. Repeat CXR showed increased opacities and mediastinal LAD. On 8/17 he had a similar episode but de-saturated to 84% and was put on 3L NC and given duonebs. ABG was normal. Shortly afterwards patient desaturated to 87% while on 3L and was increased to 4L. Repeat CXR showed increased opacities with questionable pulmonary edema, CT chest confirmed that and suggested a possible inflammatory process.     Pulm was consulted for assistance. Pending HIV, respiratory culture and blood culture. Patient's respiratory status improving with scheduled duonebs.     Interval History: Pulm was consulted for assistance. Pending HIV, respiratory culture and blood culture. Patient's respiratory status improving with scheduled duonebs.     Review of Systems   Constitutional: Negative for fever.   HENT: Negative for congestion, rhinorrhea and sinus pressure.    Eyes: Negative for redness and visual disturbance.   Respiratory: Positive for cough (nonproductive) and shortness of breath. Negative for wheezing.    Cardiovascular: Negative for chest pain and palpitations.   Gastrointestinal: Negative for abdominal pain and constipation.   Genitourinary: Negative for difficulty urinating, dysuria and hematuria.   Musculoskeletal: Negative for arthralgias.   Skin: Negative for pallor and rash.   Neurological: Negative for light-headedness.     Objective:     Vital Signs (Most Recent):  Temp: 97.3 °F (36.3 °C) (08/18/19 1147)  Pulse: 72 (08/18/19 1147)  Resp: 18 (08/18/19 1147)  BP: 127/77 (08/18/19 1147)  SpO2: 99 % (08/18/19 1147) Vital Signs (24h Range):  Temp:  [96 °F (35.6 °C)-99.4 °F (37.4 °C)] 97.3 °F (36.3 °C)  Pulse:  [] 72  Resp:  [12-20] 18  SpO2:  [92 %-99 %] 99 %  BP: (119-131)/(65-81) 127/77     Weight: 75.8 kg (167 lb 1.7 oz)  Body mass index is 25.41 kg/m².  No intake or output data in the 24 hours ending 08/18/19 1404    Physical Exam   Constitutional: He is oriented to person, place, and time. He appears well-developed and well-nourished.   HENT:   Head: Normocephalic and atraumatic.   Nose: Nose normal.   Mouth/Throat: Oropharynx is clear and moist. No oropharyngeal exudate.   Eyes: Pupils are equal, round, and reactive to light. Conjunctivae are normal. Right eye exhibits no discharge. Left eye exhibits no discharge. No scleral icterus.   Neck: Normal range of motion. Neck supple. No JVD present.   Cardiovascular: Regular rhythm, normal heart sounds and intact distal pulses. Exam reveals no gallop and no friction rub.   No murmur heard.  Pulmonary/Chest: Effort normal and breath sounds normal. No respiratory distress. He has no wheezes. He has no rales.   On 3L   Abdominal: Soft. Bowel sounds are normal. He exhibits no distension. There is no tenderness.   Musculoskeletal: Normal range of motion. He exhibits no edema.   Lymphadenopathy:     He has no cervical adenopathy.   Neurological: He is alert and oriented to person, place, and time.   Skin: Skin is warm and dry.   Psychiatric: He has a normal mood and affect. His behavior is normal.     Significant Labs:   CBC:   Recent Labs   Lab 08/17/19  0055 08/17/19  0357 08/18/19  0502   WBC 13.05* 13.79* 12.33   HGB 11.6* 11.7* 11.2*   HCT 34.7* 35.3* 33.7*   * 365* 420*     CMP:   Recent Labs   Lab 08/17/19  0357 08/18/19  0502    138   K 3.9 4.2   CL 99 100   CO2 28 30*    145*   BUN 7 11   CREATININE 0.7 0.7   CALCIUM 9.7 9.9   PROT 7.1 7.2   ALBUMIN 2.5* 2.4*   BILITOT 0.9 0.3   ALKPHOS 88 79   AST 16 21   ALT 17 17   ANIONGAP 11 8   EGFRNONAA >60.0 >60.0     All pertinent labs within the past 24 hours have been reviewed.    Significant Imaging: I have reviewed all pertinent imaging results/findings within the past 24 hours.   CT Chest Without Contrast:  Impression       Scattered ground-glass opacities as well as focal patchy opacities with associated  minimal bronchiectasis at the lower lung zones as above.  Considerations remain infectious process such as atypical organism, inflammatory etiology, as well as pulmonary edema.    Mediastinal lymphadenopathy including enlarged azygos lymph node.  These are likely reactive in etiology, favoring overall infectious process.    Electronically signed by resident: Teo Stevenson  Date: 08/17/2019  Time: 15:05    Electronically signed by: Mary Anne Barrera MD  Date: 08/17/2019  Time: 15:47     CXR:  FINDINGS:  Patient is slightly rotated.  Cardiomediastinal silhouette is midline and similar to prior.  Patchy heterogeneous opacities are again identified with a perihilar and basilar distribution, slightly increased from 08/16/2019 exam compatible with pneumonia as described in the clinical history.  Continued widening of the right paratracheal stripe which may represent mediastinal lymph node enlargement.  No pleural effusion or pneumothorax.  Trachea is midline and patent.  Osseous structures are intact.      Impression       Interval slight worsening of suspected atypical pneumonia, as above, when compared to 1 day prior.      Electronically signed by: Nelson Wright MD  Date: 08/17/2019  Time: 12:50           Assessment/Plan:      * Sepsis due to pneumonia  On admission patient tachycardic, febrile with fever 102, and elevated white count, SIRS 3/4. Given 2L IVF bolus and IV abx.    - Follow CBC and CMP daily, follow vitals q4  - See atypical pneumonia above    Acute hypoxemic respiratory failure  On 8/17 he had a similar episode as previously with fever, tachycardia and nausea but de-saturated to 84% and was put on 3L NC and given duonebs. ABG was normal. Shortly afterwards patient desaturated to 87% while on 3L and was increased to 4.5L, then down to 3L. Repeat lactic acid increased to 2.5. BNP normal 59. Repeat CXR showed increased opacities with questionable pulmonary edema, CT chest confirmed that and suggested a possible  inflammatory process. Pulm was consulted.    - DDx include atypical pneumonia, asthma exacerbation, pneumonitis, early ARDS    Acute midline thoracic back pain  Likely in the setting of lung infection causing pleural irritation given pain worse with coughing and breathing.    - Has PRN norco 5 and 10    Atypical pneumonia  Patient with cough, fevers, decreased PO intake, nausea, vomiting, generalized body aches for 6 days. CXR with interstitial and patchy opacities without focal consolidation consistent with atypical pneumonia. Mycoplasmae pneumoniae vs viral.  Likely due to immunosuppression in the setting of dupixent biologic.  Smoking history and asthma history also puts the patient at increased risk. Patient flu negative.     - Escalated to IV vanc/cefepime  - M. Pneumoniae PCR, viral respiratory panel, HIV, respiratory culture pending  - Follow up blood cultures and tailor antibiotic therapy appropriately if positive  - Pulm consulted   - See acute hypoxemic respiratory failure above      VTE Risk Mitigation (From admission, onward)        Ordered     IP VTE LOW RISK PATIENT  Once      08/15/19 6974     Place sequential compression device  Until discontinued      08/15/19 6148          Khurram Elizondo MD  Department of Hospital Medicine   [facility]

## 2019-08-18 NOTE — CARE UPDATE
Rapid Response Nurse Follow-up Note     Followed up with patient for proactive rounding.   No acute issues at this time. Currently afebrile @ 99.4F. Reviewed plan of care with primary RN, Will [id number].   Please call Rapid Response RN, Nicole Jovel RN with any questions or concerns at [phone].

## 2019-08-18 NOTE — SUBJECTIVE & OBJECTIVE
Past Medical History:   Diagnosis Date    ADHD (attention deficit hyperactivity disorder)     Asthma     Esophagitis     Food impaction of esophagus     Multiple food allergies     Vernal keratoconjunctivitis        Past Surgical History:   Procedure Laterality Date    ADENOIDECTOMY      ESOPHAGOGASTRODUODENOSCOPY (EGD) N/A 11/22/2014    Performed by Aman Hayes MD at [facility] OR [address] [place] [address]       Review of patient's allergies indicates:   Allergen Reactions    Fish containing products Other (See Comments)    Peanut Anaphylaxis    Amoxicillin Hives     Tolerates ceftriaxone    Bactrim [sulfamethoxazole-trimethoprim] Hives    Corn containing products Other (See Comments)       Family History     None        Tobacco Use    Smoking status: Current Some Day Smoker    Smokeless tobacco: Never Used   Substance and Sexual Activity    Alcohol use: No    Drug use: Yes     Types: Marijuana    Sexual activity: Yes     Partners: Female     Birth control/protection: Condom     Comment: quit 2 weeks ago.         Review of Systems   Constitutional: Positive for appetite change, chills, diaphoresis, fatigue and fever. Negative for activity change and unexpected weight change.   HENT: Negative.    Eyes: Negative.    Respiratory: Positive for cough and shortness of breath. Negative for choking, wheezing and stridor.    Cardiovascular: Negative.    Gastrointestinal: Positive for nausea. Negative for blood in stool, constipation and diarrhea.   Endocrine: Negative.    Genitourinary: Negative.    Musculoskeletal: Negative.    Skin: Negative.    Allergic/Immunologic: Negative.    Neurological: Negative.    Hematological: Negative.    Psychiatric/Behavioral: Negative.      Objective:     Vital Signs (Most Recent):  Temp: 96.5 °F (35.8 °C) (08/18/19 1635)  Pulse: 92 (08/18/19 1635)  Resp: 18 (08/18/19 1635)  BP: (!) 143/71 (08/18/19 1635)  SpO2: 97 % (08/18/19 1635) Vital Signs (24h Range):  Temp:  [96 °F (35.6 °C)-99.4 °F  (37.4 °C)] 96.5 °F (35.8 °C)  Pulse:  [] 92  Resp:  [12-20] 18  SpO2:  [92 %-99 %] 97 %  BP: (119-143)/(65-77) 143/71     Weight: 75.8 kg (167 lb 1.7 oz)  Body mass index is 25.41 kg/m².    No intake or output data in the 24 hours ending 08/18/19 1849    Physical Exam   Constitutional: He is oriented to person, place, and time. He appears well-developed and well-nourished. No distress.   HENT:   Head: Normocephalic and atraumatic.   Right Ear: External ear normal.   Left Ear: External ear normal.   Eyes: Pupils are equal, round, and reactive to light. EOM are normal. Right eye exhibits no discharge. Left eye exhibits no discharge.   Neck: Normal range of motion. Neck supple. No tracheal deviation present. No thyromegaly present.   Cardiovascular: Normal rate, regular rhythm and normal heart sounds.   No murmur heard.  Pulmonary/Chest: Effort normal and breath sounds normal. No stridor. No respiratory distress. He has no wheezes.   Abdominal: Soft. Bowel sounds are normal. He exhibits no distension. There is no tenderness.   Musculoskeletal: Normal range of motion. He exhibits no edema or deformity.   Neurological: He is alert and oriented to person, place, and time.   Skin: Skin is warm. Capillary refill takes less than 2 seconds. He is not diaphoretic. No erythema.   Psychiatric: He has a normal mood and affect. His behavior is normal.       Vents:  Oxygen Concentration (%): 32 (08/18/19 0323)    Lines/Drains/Airways     Peripheral Intravenous Line                 Peripheral IV - Single Lumen 08/15/19 1641 20 G Left Antecubital 3 days                Significant Labs:    CBC/Anemia Profile:  Recent Labs   Lab 08/17/19  0055 08/17/19  0357 08/18/19  0502   WBC 13.05* 13.79* 12.33   HGB 11.6* 11.7* 11.2*   HCT 34.7* 35.3* 33.7*   * 365* 420*   MCV 87 89 88   RDW 12.5 12.6 12.7        Chemistries:  Recent Labs   Lab 08/17/19  0357 08/18/19  0502    138   K 3.9 4.2   CL 99 100   CO2 28 30*   BUN 7 11    CREATININE 0.7 0.7   CALCIUM 9.7 9.9   ALBUMIN 2.5* 2.4*   PROT 7.1 7.2   BILITOT 0.9 0.3   ALKPHOS 88 79   ALT 17 17   AST 16 21   MG 1.8 2.1   PHOS 3.9 2.3*       BMP:   Recent Labs   Lab 08/18/19  0502   *      K 4.2      CO2 30*   BUN 11   CREATININE 0.7   CALCIUM 9.9   MG 2.1     All pertinent labs within the past 24 hours have been reviewed.    Significant Imaging:   I have reviewed and interpreted all pertinent imaging results/findings within the past 24 hours.

## 2019-08-18 NOTE — ASSESSMENT & PLAN NOTE
On 8/17 he had a similar episode as previously with fever, tachycardia and nausea but de-saturated to 84% and was put on 3L NC and given duonebs. ABG was normal. Shortly afterwards patient desaturated to 87% while on 3L and was increased to 4.5L, then down to 3L. Repeat lactic acid increased to 2.5. BNP normal 59. Repeat CXR showed increased opacities with questionable pulmonary edema, CT chest confirmed that and suggested a possible inflammatory process. Pulm was consulted.    - DDx include atypical pneumonia, asthma exacerbation, pneumonitis, early ARDS

## 2019-08-19 PROBLEM — J45.20 MILD INTERMITTENT ASTHMA WITHOUT COMPLICATION: Chronic | Status: ACTIVE | Noted: 2019-08-19

## 2019-08-19 PROBLEM — J45.909 ASTHMA: Chronic | Status: ACTIVE | Noted: 2019-08-19

## 2019-08-19 LAB
ALBUMIN SERPL BCP-MCNC: 2.3 G/DL (ref 3.5–5.2)
ALP SERPL-CCNC: 63 U/L (ref 55–135)
ALT SERPL W/O P-5'-P-CCNC: 59 U/L (ref 10–44)
ANA SER QL IF: NORMAL
ANION GAP SERPL CALC-SCNC: 7 MMOL/L (ref 8–16)
AST SERPL-CCNC: 41 U/L (ref 10–40)
BASOPHILS # BLD AUTO: 0.02 K/UL (ref 0–0.2)
BASOPHILS NFR BLD: 0.1 % (ref 0–1.9)
BILIRUB SERPL-MCNC: 0.3 MG/DL (ref 0.1–1)
BUN SERPL-MCNC: 17 MG/DL (ref 6–20)
CALCIUM SERPL-MCNC: 9.7 MG/DL (ref 8.7–10.5)
CHLORIDE SERPL-SCNC: 102 MMOL/L (ref 95–110)
CO2 SERPL-SCNC: 31 MMOL/L (ref 23–29)
CREAT SERPL-MCNC: 0.7 MG/DL (ref 0.5–1.4)
DIFFERENTIAL METHOD: ABNORMAL
EOSINOPHIL # BLD AUTO: 0 K/UL (ref 0–0.5)
EOSINOPHIL NFR BLD: 0 % (ref 0–8)
ERYTHROCYTE [DISTWIDTH] IN BLOOD BY AUTOMATED COUNT: 12.9 % (ref 11.5–14.5)
EST. GFR  (AFRICAN AMERICAN): >60 ML/MIN/1.73 M^2
EST. GFR  (NON AFRICAN AMERICAN): >60 ML/MIN/1.73 M^2
GLUCOSE SERPL-MCNC: 130 MG/DL (ref 70–110)
HCT VFR BLD AUTO: 33.1 % (ref 40–54)
HGB BLD-MCNC: 10.7 G/DL (ref 14–18)
HIV 1+2 AB+HIV1 P24 AG SERPL QL IA: NEGATIVE
IMM GRANULOCYTES # BLD AUTO: 0.17 K/UL (ref 0–0.04)
IMM GRANULOCYTES NFR BLD AUTO: 0.9 % (ref 0–0.5)
LYMPHOCYTES # BLD AUTO: 0.9 K/UL (ref 1–4.8)
LYMPHOCYTES NFR BLD: 4.9 % (ref 18–48)
MAGNESIUM SERPL-MCNC: 2 MG/DL (ref 1.6–2.6)
MCH RBC QN AUTO: 29.1 PG (ref 27–31)
MCHC RBC AUTO-ENTMCNC: 32.3 G/DL (ref 32–36)
MCV RBC AUTO: 90 FL (ref 82–98)
MONOCYTES # BLD AUTO: 0.4 K/UL (ref 0.3–1)
MONOCYTES NFR BLD: 2.1 % (ref 4–15)
NEUTROPHILS # BLD AUTO: 17 K/UL (ref 1.8–7.7)
NEUTROPHILS NFR BLD: 92 % (ref 38–73)
NRBC BLD-RTO: 0 /100 WBC
PHOSPHATE SERPL-MCNC: 4.4 MG/DL (ref 2.7–4.5)
PLATELET # BLD AUTO: 454 K/UL (ref 150–350)
PMV BLD AUTO: 9.1 FL (ref 9.2–12.9)
POTASSIUM SERPL-SCNC: 4.4 MMOL/L (ref 3.5–5.1)
PROT SERPL-MCNC: 6.7 G/DL (ref 6–8.4)
RBC # BLD AUTO: 3.68 M/UL (ref 4.6–6.2)
SODIUM SERPL-SCNC: 140 MMOL/L (ref 136–145)
WBC # BLD AUTO: 18.44 K/UL (ref 3.9–12.7)

## 2019-08-19 PROCEDURE — 84100 ASSAY OF PHOSPHORUS: CPT

## 2019-08-19 PROCEDURE — 63600175 PHARM REV CODE 636 W HCPCS: Performed by: HOSPITALIST

## 2019-08-19 PROCEDURE — 99900035 HC TECH TIME PER 15 MIN (STAT)

## 2019-08-19 PROCEDURE — 11000001 HC ACUTE MED/SURG PRIVATE ROOM

## 2019-08-19 PROCEDURE — 63600175 PHARM REV CODE 636 W HCPCS: Performed by: STUDENT IN AN ORGANIZED HEALTH CARE EDUCATION/TRAINING PROGRAM

## 2019-08-19 PROCEDURE — 99233 PR SUBSEQUENT HOSPITAL CARE,LEVL III: ICD-10-PCS | Mod: ,,, | Performed by: HOSPITALIST

## 2019-08-19 PROCEDURE — 27000221 HC OXYGEN, UP TO 24 HOURS

## 2019-08-19 PROCEDURE — 36415 COLL VENOUS BLD VENIPUNCTURE: CPT

## 2019-08-19 PROCEDURE — 83735 ASSAY OF MAGNESIUM: CPT

## 2019-08-19 PROCEDURE — 85025 COMPLETE CBC W/AUTO DIFF WBC: CPT

## 2019-08-19 PROCEDURE — 80053 COMPREHEN METABOLIC PANEL: CPT

## 2019-08-19 PROCEDURE — 94640 AIRWAY INHALATION TREATMENT: CPT

## 2019-08-19 PROCEDURE — 99233 SBSQ HOSP IP/OBS HIGH 50: CPT | Mod: ,,, | Performed by: HOSPITALIST

## 2019-08-19 PROCEDURE — 25000242 PHARM REV CODE 250 ALT 637 W/ HCPCS: Performed by: STUDENT IN AN ORGANIZED HEALTH CARE EDUCATION/TRAINING PROGRAM

## 2019-08-19 PROCEDURE — 63600175 PHARM REV CODE 636 W HCPCS: Performed by: INTERNAL MEDICINE

## 2019-08-19 PROCEDURE — 94761 N-INVAS EAR/PLS OXIMETRY MLT: CPT

## 2019-08-19 PROCEDURE — 25000003 PHARM REV CODE 250: Performed by: STUDENT IN AN ORGANIZED HEALTH CARE EDUCATION/TRAINING PROGRAM

## 2019-08-19 RX ORDER — LEVOFLOXACIN 5 MG/ML
750 INJECTION, SOLUTION INTRAVENOUS
Status: DISCONTINUED | OUTPATIENT
Start: 2019-08-19 | End: 2019-08-19

## 2019-08-19 RX ORDER — LEVOFLOXACIN 750 MG/1
750 TABLET ORAL DAILY
Status: DISCONTINUED | OUTPATIENT
Start: 2019-08-19 | End: 2019-08-19

## 2019-08-19 RX ORDER — LEVOFLOXACIN 5 MG/ML
750 INJECTION, SOLUTION INTRAVENOUS
Status: DISCONTINUED | OUTPATIENT
Start: 2019-08-19 | End: 2019-08-20 | Stop reason: HOSPADM

## 2019-08-19 RX ADMIN — VANCOMYCIN HYDROCHLORIDE 1250 MG: 1.25 INJECTION, POWDER, LYOPHILIZED, FOR SOLUTION INTRAVENOUS at 12:08

## 2019-08-19 RX ADMIN — ONDANSETRON 4 MG: 4 TABLET, ORALLY DISINTEGRATING ORAL at 10:08

## 2019-08-19 RX ADMIN — IPRATROPIUM BROMIDE AND ALBUTEROL SULFATE 3 ML: .5; 3 SOLUTION RESPIRATORY (INHALATION) at 04:08

## 2019-08-19 RX ADMIN — VILAZODONE HYDROCHLORIDE 40 MG: 10 TABLET ORAL at 10:08

## 2019-08-19 RX ADMIN — CETIRIZINE HYDROCHLORIDE 10 MG: 10 TABLET, FILM COATED ORAL at 10:08

## 2019-08-19 RX ADMIN — LEVOFLOXACIN 750 MG: 750 INJECTION, SOLUTION INTRAVENOUS at 11:08

## 2019-08-19 RX ADMIN — IPRATROPIUM BROMIDE AND ALBUTEROL SULFATE 3 ML: .5; 3 SOLUTION RESPIRATORY (INHALATION) at 07:08

## 2019-08-19 RX ADMIN — CEFEPIME 2 G: 2 INJECTION, POWDER, FOR SOLUTION INTRAVENOUS at 06:08

## 2019-08-19 RX ADMIN — PREDNISONE 50 MG: 20 TABLET ORAL at 10:08

## 2019-08-19 RX ADMIN — HYDROCODONE BITARTRATE AND ACETAMINOPHEN 1 TABLET: 10; 325 TABLET ORAL at 02:08

## 2019-08-19 RX ADMIN — IPRATROPIUM BROMIDE AND ALBUTEROL SULFATE 3 ML: .5; 3 SOLUTION RESPIRATORY (INHALATION) at 11:08

## 2019-08-19 NOTE — ASSESSMENT & PLAN NOTE
On 8/17 he had a similar episode as previously with fever, tachycardia and nausea but de-saturated to 84% and was put on 3L NC and given duonebs. ABG was normal. Shortly afterwards patient desaturated to 87% while on 3L and was increased to 4.5L, then down to 3L. Repeat lactic acid increased to 2.5. BNP normal 59. Repeat CXR showed increased opacities with questionable pulmonary edema, CT chest confirmed that and suggested a possible inflammatory process but confirmed an atypical PNA with lymphadenopathy. Pulm was consulted, they agreed that this was likely atypical PNA which exacerbated his asthma, ARDS or hypersensitivity pneumonitis unlikely.    - On 2L nasal cannula this AM and plan to wean today. On 1L now  - Continue scheduled duonebs q4

## 2019-08-19 NOTE — ASSESSMENT & PLAN NOTE
Patient with cough, fevers, decreased PO intake, nausea, vomiting, generalized body aches for 6 days. CXR with interstitial and patchy opacities without focal consolidation consistent with atypical pneumonia. Mycoplasmae pneumoniae vs viral.  Possibly due to immunosuppression in the setting of dupixent biologic. Smoking history and asthma history also puts the patient at increased risk. Patient flu negative.     - De-escalated from IV vanc/cefepime to levaquin on 8/19  - M. Pneumoniae PCR, viral respiratory panel, HIV, respiratory culture pending  - Follow up blood cultures and tailor antibiotic therapy appropriately if positive  - Pulm consulted   - See acute hypoxemic respiratory failure above

## 2019-08-19 NOTE — SUBJECTIVE & OBJECTIVE
Interval History: Patient feels much better today, still dyspneic and not at baseline but his nausea, fevers, chills, facial flushing have resolved. Back and chest wall pain improved.    Review of Systems   Constitutional: Positive for appetite change and fatigue. Negative for chills, diaphoresis and fever.   HENT: Negative for congestion, rhinorrhea and sinus pressure.    Eyes: Negative for redness and visual disturbance.   Respiratory: Positive for shortness of breath. Negative for cough, chest tightness and wheezing.    Cardiovascular: Negative for chest pain and palpitations.   Gastrointestinal: Positive for nausea. Negative for abdominal pain, constipation, diarrhea and vomiting.   Genitourinary: Negative for difficulty urinating, dysuria and hematuria.   Musculoskeletal: Positive for back pain and myalgias. Negative for arthralgias.   Skin: Negative for pallor and rash.   Neurological: Negative for light-headedness and headaches.     Objective:     Vital Signs (Most Recent):  Temp: 97.8 °F (36.6 °C) (08/19/19 1113)  Pulse: 81 (08/19/19 1113)  Resp: 15 (08/19/19 0817)  BP: 129/69 (08/19/19 1113)  SpO2: 97 % (08/19/19 1113) Vital Signs (24h Range):  Temp:  [96.3 °F (35.7 °C)-97.8 °F (36.6 °C)] 97.8 °F (36.6 °C)  Pulse:  [] 81  Resp:  [12-20] 15  SpO2:  [94 %-99 %] 97 %  BP: (127-143)/(63-79) 129/69     Weight: 75.8 kg (167 lb 1.7 oz)  Body mass index is 25.41 kg/m².  No intake or output data in the 24 hours ending 08/19/19 1122   Physical Exam   Constitutional: He is oriented to person, place, and time. He appears well-developed and well-nourished.   HENT:   Head: Normocephalic and atraumatic.   Nose: Nose normal.   Mouth/Throat: Oropharynx is clear and moist. No oropharyngeal exudate.   Eyes: Pupils are equal, round, and reactive to light. Conjunctivae are normal. Right eye exhibits no discharge. Left eye exhibits no discharge. No scleral icterus.   Neck: Normal range of motion. Neck supple. No JVD  present.   Cardiovascular: Normal rate, regular rhythm, normal heart sounds and intact distal pulses. Exam reveals no gallop and no friction rub.   No murmur heard.  Pulmonary/Chest: Effort normal. No respiratory distress. He has no wheezes. He has no rales.   On 2L nasal cannula   Abdominal: Soft. Bowel sounds are normal. He exhibits no distension. There is no tenderness.   Musculoskeletal: Normal range of motion. He exhibits no edema.   Lymphadenopathy:     He has no cervical adenopathy.   Neurological: He is alert and oriented to person, place, and time.   Skin: Skin is warm and dry. No rash noted.   Psychiatric: He has a normal mood and affect. His behavior is normal.       Significant Labs: All pertinent labs within the past 24 hours have been reviewed.  Recent Results (from the past 24 hour(s))   Comprehensive Metabolic Panel (CMP)    Collection Time: 08/19/19  7:21 AM   Result Value Ref Range    Sodium 140 136 - 145 mmol/L    Potassium 4.4 3.5 - 5.1 mmol/L    Chloride 102 95 - 110 mmol/L    CO2 31 (H) 23 - 29 mmol/L    Glucose 130 (H) 70 - 110 mg/dL    BUN, Bld 17 6 - 20 mg/dL    Creatinine 0.7 0.5 - 1.4 mg/dL    Calcium 9.7 8.7 - 10.5 mg/dL    Total Protein 6.7 6.0 - 8.4 g/dL    Albumin 2.3 (L) 3.5 - 5.2 g/dL    Total Bilirubin 0.3 0.1 - 1.0 mg/dL    Alkaline Phosphatase 63 55 - 135 U/L    AST 41 (H) 10 - 40 U/L    ALT 59 (H) 10 - 44 U/L    Anion Gap 7 (L) 8 - 16 mmol/L    eGFR if African American >60.0 >60 mL/min/1.73 m^2    eGFR if non African American >60.0 >60 mL/min/1.73 m^2   Magnesium    Collection Time: 08/19/19  7:21 AM   Result Value Ref Range    Magnesium 2.0 1.6 - 2.6 mg/dL   Phosphorus    Collection Time: 08/19/19  7:21 AM   Result Value Ref Range    Phosphorus 4.4 2.7 - 4.5 mg/dL   CBC with Automated Differential    Collection Time: 08/19/19  7:21 AM   Result Value Ref Range    WBC 18.44 (H) 3.90 - 12.70 K/uL    RBC 3.68 (L) 4.60 - 6.20 M/uL    Hemoglobin 10.7 (L) 14.0 - 18.0 g/dL    Hematocrit  33.1 (L) 40.0 - 54.0 %    Mean Corpuscular Volume 90 82 - 98 fL    Mean Corpuscular Hemoglobin 29.1 27.0 - 31.0 pg    Mean Corpuscular Hemoglobin Conc 32.3 32.0 - 36.0 g/dL    RDW 12.9 11.5 - 14.5 %    Platelets 454 (H) 150 - 350 K/uL    MPV 9.1 (L) 9.2 - 12.9 fL    Immature Granulocytes 0.9 (H) 0.0 - 0.5 %    Gran # (ANC) 17.0 (H) 1.8 - 7.7 K/uL    Immature Grans (Abs) 0.17 (H) 0.00 - 0.04 K/uL    Lymph # 0.9 (L) 1.0 - 4.8 K/uL    Mono # 0.4 0.3 - 1.0 K/uL    Eos # 0.0 0.0 - 0.5 K/uL    Baso # 0.02 0.00 - 0.20 K/uL    nRBC 0 0 /100 WBC    Gran% 92.0 (H) 38.0 - 73.0 %    Lymph% 4.9 (L) 18.0 - 48.0 %    Mono% 2.1 (L) 4.0 - 15.0 %    Eosinophil% 0.0 0.0 - 8.0 %    Basophil% 0.1 0.0 - 1.9 %    Differential Method Automated          Significant Imaging: I have reviewed all pertinent imaging results/findings within the past 24 hours.

## 2019-08-19 NOTE — CONSULTS
Therapy with Vancomycin complete and consult discontinued by provider.  Pharmacy will sign off, please re-consult as needed.    Thank you,  Brigida Samuels   [id number]

## 2019-08-19 NOTE — ASSESSMENT & PLAN NOTE
On home on dupixent, certrizine, monteleukast and albuterol inhaler. Since being on dupixent for 2 years, he has only used his asthma inhaler a few times a year.    - Plan for discharge with home scheduled duonebs q4 and proair inhaler PRN  - Follow outpatient with a PCP. He currently does not have one so he will need a referral

## 2019-08-19 NOTE — PLAN OF CARE
08/19/19 1441   Discharge Reassessment   Assessment Type Discharge Planning Reassessment   Provided patient/caregiver education on the expected discharge date and the discharge plan Yes   Do you have any problems affording any of your prescribed medications? No   Discharge Plan A Home with family   Discharge Plan B Home with family   DME Needed Upon Discharge  none   Patient choice form signed by patient/caregiver Yes   Anticipated Discharge Disposition Home   Can the patient answer the patient profile reliably? Yes, cognitively intact   How does the patient rate their overall health at the present time? Fair   Describe the patient's ability to walk at the present time. No restrictions   How often would a person be available to care for the patient? Whenever needed   Number of comorbid conditions (as recorded on the chart) One   During the past month, has the patient often been bothered by feeling down, depressed or hopeless? No   During the past month, has the patient often been bothered by little interest or pleasure in doing things? No   Post-Acute Status   Post-Acute Authorization Other   Other Status No Post-Acute Service Needs

## 2019-08-19 NOTE — PLAN OF CARE
Problem: Adult Inpatient Plan of Care  Goal: Plan of Care Review  Outcome: Ongoing (interventions implemented as appropriate)  AAOx4. Vitals are as charted. NADN. On O2 @ 2.5L NC,  Denies pain. Hourly rounding for pt care and safety. White board updated. Plan reviewed with patient. No falls reported. Safety precautions maintained. Call light in reach.

## 2019-08-19 NOTE — PROGRESS NOTES
[facility] Hospital Medicine  Progress Note    Patient Name: Fernie Rice  MRN: [id number]  Patient Class: IP- Inpatient   Admission Date: 8/15/2019  Length of Stay: 4 days  Attending Physician: Destiny Connelly MD  Primary Care Provider: Fernie Garcia MD    [facility] Medicine Team: [facility] MED 2 Brooks Chavis DO    Subjective:     Principal Problem:Sepsis due to pneumonia    HPI:  Mr. Rice is a 17 yo male with history of atopic dermatitis, intermittent asthma, esophagitis and depression who presents with chief complaint of generalized body aches for 6 days. The aches occur mainly in his upper abdomen beneath his rib cage and upper back but have occurred elsewhere.  The pain is worse when he takes a deep breath and coughs, relieved by percocet taken once at home.  During the same time he has also had subjective fevers, chills, diaphoresis, decreased PO intake, occipital headaches, intermittent non-productive cough, and occasional shortness of breath related to pain. Beginning 2 days ago, he began to develop intermittent nausea and non-bloody, non-bilious vomiting. He had one episode of looser stool yesterday. He has not had congestion, runny nose, sore throat, rash, chest pain.  He said he has had one sick contact a few weeks ago with a friend who developed pneumonia.     In the ED, VS significant for patient febrile with Tm 102, tachycardic with , other VSS.  Labs were significant for WBC 17, UA positive for 11 RBC, Flu negative.  Chest xray obtained which revealed patchy and interstitial opacities bilaterally in mid and inferior lung zones concerning for atypical pneumonia. Otherwise there was no focal consolidation. He received doses of acetaminophen, ibuprofen and ketoralac for his pain with minimal improvement, 2L NS bolus, 1 dose of IV rochephin and 1 dose of azithromycin.     Overview/Hospital Course:  On 8/16 patient had 2 episodes of spiked fever, nausea and tachycardia that was  relieved by PRN tylenol and zofran, patient was breathing well and O2 sat remained normal on RA. Repeat CXR showed increased opacities and mediastinal LAD. On 8/17 he had a similar episode but de-saturated to 84% and was put on 3L NC and given duonebs. ABG was normal. Shortly afterwards patient desaturated to 87% while on 3L and was increased to 4L. Repeat CXR showed increased opacities with questionable pulmonary edema, CT chest confirmed that and suggested a possible inflammatory process. Pulm was consulted for assistance. Patient's respiratory status improving with scheduled duonebs, so likely just atypical PNA compounded by his asthma. HIV negative. Respiratory and blood cultures negative. ALEX and ANCA per pulm pending.    Interval History: Patient feels much better today, still dyspneic and not at baseline but his nausea, fevers, chills, facial flushing have resolved. Back and chest wall pain improved.    Review of Systems   Constitutional: Positive for appetite change and fatigue. Negative for chills, diaphoresis and fever.   HENT: Negative for congestion, rhinorrhea and sinus pressure.    Eyes: Negative for redness and visual disturbance.   Respiratory: Positive for shortness of breath. Negative for cough, chest tightness and wheezing.    Cardiovascular: Negative for chest pain and palpitations.   Gastrointestinal: Positive for nausea. Negative for abdominal pain, constipation, diarrhea and vomiting.   Genitourinary: Negative for difficulty urinating, dysuria and hematuria.   Musculoskeletal: Positive for back pain and myalgias. Negative for arthralgias.   Skin: Negative for pallor and rash.   Neurological: Negative for light-headedness and headaches.     Objective:     Vital Signs (Most Recent):  Temp: 97.8 °F (36.6 °C) (08/19/19 1113)  Pulse: 81 (08/19/19 1113)  Resp: 15 (08/19/19 0817)  BP: 129/69 (08/19/19 1113)  SpO2: 97 % (08/19/19 1113) Vital Signs (24h Range):  Temp:  [96.3 °F (35.7 °C)-97.8 °F (36.6  °C)] 97.8 °F (36.6 °C)  Pulse:  [] 81  Resp:  [12-20] 15  SpO2:  [94 %-99 %] 97 %  BP: (127-143)/(63-79) 129/69     Weight: 75.8 kg (167 lb 1.7 oz)  Body mass index is 25.41 kg/m².  No intake or output data in the 24 hours ending 08/19/19 1122   Physical Exam   Constitutional: He is oriented to person, place, and time. He appears well-developed and well-nourished.   HENT:   Head: Normocephalic and atraumatic.   Nose: Nose normal.   Mouth/Throat: Oropharynx is clear and moist. No oropharyngeal exudate.   Eyes: Pupils are equal, round, and reactive to light. Conjunctivae are normal. Right eye exhibits no discharge. Left eye exhibits no discharge. No scleral icterus.   Neck: Normal range of motion. Neck supple. No JVD present.   Cardiovascular: Normal rate, regular rhythm, normal heart sounds and intact distal pulses. Exam reveals no gallop and no friction rub.   No murmur heard.  Pulmonary/Chest: Effort normal. No respiratory distress. He has no wheezes. He has no rales.   On 2L nasal cannula   Abdominal: Soft. Bowel sounds are normal. He exhibits no distension. There is no tenderness.   Musculoskeletal: Normal range of motion. He exhibits no edema.   Lymphadenopathy:     He has no cervical adenopathy.   Neurological: He is alert and oriented to person, place, and time.   Skin: Skin is warm and dry. No rash noted.   Psychiatric: He has a normal mood and affect. His behavior is normal.       Significant Labs: All pertinent labs within the past 24 hours have been reviewed.  Recent Results (from the past 24 hour(s))   Comprehensive Metabolic Panel (CMP)    Collection Time: 08/19/19  7:21 AM   Result Value Ref Range    Sodium 140 136 - 145 mmol/L    Potassium 4.4 3.5 - 5.1 mmol/L    Chloride 102 95 - 110 mmol/L    CO2 31 (H) 23 - 29 mmol/L    Glucose 130 (H) 70 - 110 mg/dL    BUN, Bld 17 6 - 20 mg/dL    Creatinine 0.7 0.5 - 1.4 mg/dL    Calcium 9.7 8.7 - 10.5 mg/dL    Total Protein 6.7 6.0 - 8.4 g/dL    Albumin 2.3  (L) 3.5 - 5.2 g/dL    Total Bilirubin 0.3 0.1 - 1.0 mg/dL    Alkaline Phosphatase 63 55 - 135 U/L    AST 41 (H) 10 - 40 U/L    ALT 59 (H) 10 - 44 U/L    Anion Gap 7 (L) 8 - 16 mmol/L    eGFR if African American >60.0 >60 mL/min/1.73 m^2    eGFR if non African American >60.0 >60 mL/min/1.73 m^2   Magnesium    Collection Time: 08/19/19  7:21 AM   Result Value Ref Range    Magnesium 2.0 1.6 - 2.6 mg/dL   Phosphorus    Collection Time: 08/19/19  7:21 AM   Result Value Ref Range    Phosphorus 4.4 2.7 - 4.5 mg/dL   CBC with Automated Differential    Collection Time: 08/19/19  7:21 AM   Result Value Ref Range    WBC 18.44 (H) 3.90 - 12.70 K/uL    RBC 3.68 (L) 4.60 - 6.20 M/uL    Hemoglobin 10.7 (L) 14.0 - 18.0 g/dL    Hematocrit 33.1 (L) 40.0 - 54.0 %    Mean Corpuscular Volume 90 82 - 98 fL    Mean Corpuscular Hemoglobin 29.1 27.0 - 31.0 pg    Mean Corpuscular Hemoglobin Conc 32.3 32.0 - 36.0 g/dL    RDW 12.9 11.5 - 14.5 %    Platelets 454 (H) 150 - 350 K/uL    MPV 9.1 (L) 9.2 - 12.9 fL    Immature Granulocytes 0.9 (H) 0.0 - 0.5 %    Gran # (ANC) 17.0 (H) 1.8 - 7.7 K/uL    Immature Grans (Abs) 0.17 (H) 0.00 - 0.04 K/uL    Lymph # 0.9 (L) 1.0 - 4.8 K/uL    Mono # 0.4 0.3 - 1.0 K/uL    Eos # 0.0 0.0 - 0.5 K/uL    Baso # 0.02 0.00 - 0.20 K/uL    nRBC 0 0 /100 WBC    Gran% 92.0 (H) 38.0 - 73.0 %    Lymph% 4.9 (L) 18.0 - 48.0 %    Mono% 2.1 (L) 4.0 - 15.0 %    Eosinophil% 0.0 0.0 - 8.0 %    Basophil% 0.1 0.0 - 1.9 %    Differential Method Automated          Significant Imaging: I have reviewed all pertinent imaging results/findings within the past 24 hours.      Assessment/Plan:      * Sepsis due to pneumonia  On admission patient tachycardic, febrile with fever 102, and elevated white count, SIRS 3/4. Given 2L IVF bolus and IV abx.    - Follow CBC and CMP daily, follow vitals q4  - See atypical pneumonia above    Acute hypoxemic respiratory failure  On 8/17 he had a similar episode as previously with fever, tachycardia and  nausea but de-saturated to 84% and was put on 3L NC and given duonebs. ABG was normal. Shortly afterwards patient desaturated to 87% while on 3L and was increased to 4.5L, then down to 3L. Repeat lactic acid increased to 2.5. BNP normal 59. Repeat CXR showed increased opacities with questionable pulmonary edema, CT chest confirmed that and suggested a possible inflammatory process but confirmed an atypical PNA with lymphadenopathy. Pulm was consulted, they agreed that this was likely atypical PNA which exacerbated his asthma, ARDS or hypersensitivity pneumonitis unlikely.    - On 2L nasal cannula this AM and plan to wean today. On 1L now  - Continue scheduled duonebs q4    Asthma  On home on dupixent, certrizine, monteleukast and albuterol inhaler. Since being on dupixent for 2 years, he has only used his asthma inhaler a few times a year.    - Plan for discharge with home scheduled duonebs q4 and proair inhaler PRN  - Follow outpatient with a PCP. He currently does not have one so he will need a referral    Acute midline thoracic back pain  Likely in the setting of lung infection causing pleural irritation given pain worse with coughing and breathing.    - Has PRN norco 5 and 10    Pneumonia due to infectious organism  Patient with cough, fevers, decreased PO intake, nausea, vomiting, generalized body aches for 6 days. CXR with interstitial and patchy opacities without focal consolidation consistent with atypical pneumonia. Mycoplasmae pneumoniae vs viral.  Possibly due to immunosuppression in the setting of dupixent biologic. Smoking history and asthma history also puts the patient at increased risk. Patient flu negative.     - De-escalated from IV vanc/cefepime to levaquin on 8/19  - M. Pneumoniae PCR, viral respiratory panel, HIV, respiratory culture pending  - Follow up blood cultures and tailor antibiotic therapy appropriately if positive  - Pulm consulted   - See acute hypoxemic respiratory failure  above      VTE Risk Mitigation (From admission, onward)        Ordered     IP VTE LOW RISK PATIENT  Once      08/15/19 2358     Place sequential compression device  Until discontinued      08/15/19 1592                Brooks Chavis DO  Department of Hospital Medicine   [facility]

## 2019-08-20 VITALS
DIASTOLIC BLOOD PRESSURE: 74 MMHG | RESPIRATION RATE: 18 BRPM | WEIGHT: 167.13 LBS | HEART RATE: 84 BPM | TEMPERATURE: 98 F | SYSTOLIC BLOOD PRESSURE: 135 MMHG | HEIGHT: 68 IN | OXYGEN SATURATION: 92 % | BODY MASS INDEX: 25.33 KG/M2

## 2019-08-20 PROBLEM — A41.9 SEPSIS DUE TO PNEUMONIA: Status: RESOLVED | Noted: 2019-08-16 | Resolved: 2019-08-20

## 2019-08-20 PROBLEM — J96.01 ACUTE HYPOXEMIC RESPIRATORY FAILURE: Status: RESOLVED | Noted: 2019-08-17 | Resolved: 2019-08-20

## 2019-08-20 PROBLEM — J18.9 SEPSIS DUE TO PNEUMONIA: Status: RESOLVED | Noted: 2019-08-16 | Resolved: 2019-08-20

## 2019-08-20 PROBLEM — B00.59 HSV (HERPES SIMPLEX VIRUS) INFECTION OF EYELID: Status: ACTIVE | Noted: 2019-08-20

## 2019-08-20 LAB
ALBUMIN SERPL BCP-MCNC: 2.2 G/DL (ref 3.5–5.2)
ALP SERPL-CCNC: 64 U/L (ref 55–135)
ALT SERPL W/O P-5'-P-CCNC: 61 U/L (ref 10–44)
ANCA AB TITR SER IF: NORMAL TITER
ANION GAP SERPL CALC-SCNC: 11 MMOL/L (ref 8–16)
AST SERPL-CCNC: 22 U/L (ref 10–40)
BACTERIA BLD CULT: NORMAL
BACTERIA BLD CULT: NORMAL
BACTERIA SPEC AEROBE CULT: NORMAL
BACTERIA SPEC AEROBE CULT: NORMAL
BASOPHILS # BLD AUTO: 0.02 K/UL (ref 0–0.2)
BASOPHILS NFR BLD: 0.2 % (ref 0–1.9)
BILIRUB SERPL-MCNC: 0.4 MG/DL (ref 0.1–1)
BUN SERPL-MCNC: 14 MG/DL (ref 6–20)
CALCIUM SERPL-MCNC: 9.4 MG/DL (ref 8.7–10.5)
CHLORIDE SERPL-SCNC: 98 MMOL/L (ref 95–110)
CO2 SERPL-SCNC: 29 MMOL/L (ref 23–29)
CREAT SERPL-MCNC: 0.7 MG/DL (ref 0.5–1.4)
DIFFERENTIAL METHOD: ABNORMAL
ENTEROVIRUS: NOT DETECTED
EOSINOPHIL # BLD AUTO: 0 K/UL (ref 0–0.5)
EOSINOPHIL NFR BLD: 0.3 % (ref 0–8)
ERYTHROCYTE [DISTWIDTH] IN BLOOD BY AUTOMATED COUNT: 13 % (ref 11.5–14.5)
EST. GFR  (AFRICAN AMERICAN): >60 ML/MIN/1.73 M^2
EST. GFR  (NON AFRICAN AMERICAN): >60 ML/MIN/1.73 M^2
GLUCOSE SERPL-MCNC: 92 MG/DL (ref 70–110)
GRAM STN SPEC: NORMAL
HCT VFR BLD AUTO: 32.3 % (ref 40–54)
HGB BLD-MCNC: 10.6 G/DL (ref 14–18)
HUMAN BOCAVIRUS: NOT DETECTED
HUMAN CORONAVIRUS, COMMON COLD VIRUS: NOT DETECTED
IMM GRANULOCYTES # BLD AUTO: 0.16 K/UL (ref 0–0.04)
IMM GRANULOCYTES NFR BLD AUTO: 1.4 % (ref 0–0.5)
INFLUENZA A - H1N1-09: NOT DETECTED
LYMPHOCYTES # BLD AUTO: 1.1 K/UL (ref 1–4.8)
LYMPHOCYTES NFR BLD: 9.3 % (ref 18–48)
MAGNESIUM SERPL-MCNC: 1.8 MG/DL (ref 1.6–2.6)
MCH RBC QN AUTO: 29.1 PG (ref 27–31)
MCHC RBC AUTO-ENTMCNC: 32.8 G/DL (ref 32–36)
MCV RBC AUTO: 89 FL (ref 82–98)
MONOCYTES # BLD AUTO: 0.1 K/UL (ref 0.3–1)
MONOCYTES NFR BLD: 1.2 % (ref 4–15)
NEUTROPHILS # BLD AUTO: 10 K/UL (ref 1.8–7.7)
NEUTROPHILS NFR BLD: 87.6 % (ref 38–73)
NRBC BLD-RTO: 0 /100 WBC
P-ANCA TITR SER IF: NORMAL TITER
PARAINFLUENZA: NOT DETECTED
PHOSPHATE SERPL-MCNC: 2.8 MG/DL (ref 2.7–4.5)
PLATELET # BLD AUTO: 499 K/UL (ref 150–350)
PMV BLD AUTO: 9.3 FL (ref 9.2–12.9)
POTASSIUM SERPL-SCNC: 3.7 MMOL/L (ref 3.5–5.1)
PROT SERPL-MCNC: 6.5 G/DL (ref 6–8.4)
RBC # BLD AUTO: 3.64 M/UL (ref 4.6–6.2)
RVP - ADENOVIRUS: NOT DETECTED
RVP - HUMAN METAPNEUMOVIRUS (HMPV): NOT DETECTED
RVP - INFLUENZA A: NOT DETECTED
RVP - INFLUENZA B: NOT DETECTED
RVP - RESPIRATORY SYNCTIAL VIRUS (RSV) A: NOT DETECTED
RVP - RESPIRATORY VIRAL PANEL, SOURCE: NORMAL
RVP - RHINOVIRUS: NOT DETECTED
SODIUM SERPL-SCNC: 138 MMOL/L (ref 136–145)
WBC # BLD AUTO: 11.44 K/UL (ref 3.9–12.7)

## 2019-08-20 PROCEDURE — 99239 HOSP IP/OBS DSCHRG MGMT >30: CPT | Mod: ,,, | Performed by: INTERNAL MEDICINE

## 2019-08-20 PROCEDURE — 94761 N-INVAS EAR/PLS OXIMETRY MLT: CPT

## 2019-08-20 PROCEDURE — 80053 COMPREHEN METABOLIC PANEL: CPT

## 2019-08-20 PROCEDURE — 36415 COLL VENOUS BLD VENIPUNCTURE: CPT

## 2019-08-20 PROCEDURE — 85025 COMPLETE CBC W/AUTO DIFF WBC: CPT

## 2019-08-20 PROCEDURE — 83735 ASSAY OF MAGNESIUM: CPT

## 2019-08-20 PROCEDURE — 99239 PR HOSPITAL DISCHARGE DAY,>30 MIN: ICD-10-PCS | Mod: ,,, | Performed by: INTERNAL MEDICINE

## 2019-08-20 PROCEDURE — 63600175 PHARM REV CODE 636 W HCPCS: Performed by: INTERNAL MEDICINE

## 2019-08-20 PROCEDURE — 84100 ASSAY OF PHOSPHORUS: CPT

## 2019-08-20 PROCEDURE — 25000003 PHARM REV CODE 250: Performed by: STUDENT IN AN ORGANIZED HEALTH CARE EDUCATION/TRAINING PROGRAM

## 2019-08-20 PROCEDURE — 25000242 PHARM REV CODE 250 ALT 637 W/ HCPCS: Performed by: STUDENT IN AN ORGANIZED HEALTH CARE EDUCATION/TRAINING PROGRAM

## 2019-08-20 PROCEDURE — 94640 AIRWAY INHALATION TREATMENT: CPT

## 2019-08-20 RX ORDER — LEVOFLOXACIN 750 MG/1
750 TABLET ORAL DAILY
Qty: 2 TABLET | Refills: 0 | Status: SHIPPED | OUTPATIENT
Start: 2019-08-20 | End: 2019-08-20 | Stop reason: SDUPTHER

## 2019-08-20 RX ORDER — HYDROCODONE BITARTRATE AND ACETAMINOPHEN 5; 325 MG/1; MG/1
1 TABLET ORAL EVERY 8 HOURS PRN
Qty: 5 TABLET | Refills: 0 | Status: SHIPPED | OUTPATIENT
Start: 2019-08-20 | End: 2019-12-31

## 2019-08-20 RX ORDER — VALACYCLOVIR HYDROCHLORIDE 500 MG/1
1000 TABLET, FILM COATED ORAL 2 TIMES DAILY
Status: DISCONTINUED | OUTPATIENT
Start: 2019-08-20 | End: 2019-08-20 | Stop reason: HOSPADM

## 2019-08-20 RX ORDER — LEVOFLOXACIN 25 MG/ML
750 SOLUTION ORAL DAILY
Qty: 60 ML | Refills: 0 | Status: SHIPPED | OUTPATIENT
Start: 2019-08-20 | End: 2019-08-20 | Stop reason: HOSPADM

## 2019-08-20 RX ORDER — IPRATROPIUM BROMIDE AND ALBUTEROL SULFATE 2.5; .5 MG/3ML; MG/3ML
3 SOLUTION RESPIRATORY (INHALATION) EVERY 4 HOURS PRN
Qty: 180 ML | Refills: 0 | Status: SHIPPED | OUTPATIENT
Start: 2019-08-20 | End: 2020-02-05

## 2019-08-20 RX ORDER — LEVOFLOXACIN 25 MG/ML
750 SOLUTION ORAL DAILY
Qty: 90 ML | Refills: 0 | Status: SHIPPED | OUTPATIENT
Start: 2019-08-20 | End: 2019-08-23

## 2019-08-20 RX ORDER — PREDNISONE 10 MG/1
50 TABLET ORAL DAILY
Qty: 10 TABLET | Refills: 0 | Status: SHIPPED | OUTPATIENT
Start: 2019-08-20 | End: 2019-08-22 | Stop reason: ALTCHOICE

## 2019-08-20 RX ORDER — VALACYCLOVIR HYDROCHLORIDE 1 G/1
1000 TABLET, FILM COATED ORAL 2 TIMES DAILY
Qty: 4 TABLET | Refills: 0 | Status: SHIPPED | OUTPATIENT
Start: 2019-08-20 | End: 2019-08-21 | Stop reason: SDUPTHER

## 2019-08-20 RX ORDER — ALBUTEROL SULFATE 90 UG/1
1-2 AEROSOL, METERED RESPIRATORY (INHALATION) EVERY 6 HOURS PRN
Qty: 18 G | Refills: 0 | Status: SHIPPED | OUTPATIENT
Start: 2019-08-20

## 2019-08-20 RX ORDER — LEVOFLOXACIN 25 MG/ML
750 SOLUTION ORAL DAILY
Qty: 60 ML | Refills: 0 | Status: SHIPPED | OUTPATIENT
Start: 2019-08-20 | End: 2019-08-20 | Stop reason: SDUPTHER

## 2019-08-20 RX ORDER — ONDANSETRON 4 MG/1
4 TABLET, ORALLY DISINTEGRATING ORAL EVERY 8 HOURS PRN
Qty: 21 TABLET | Refills: 0 | Status: ON HOLD | OUTPATIENT
Start: 2019-08-20 | End: 2020-02-05

## 2019-08-20 RX ORDER — LEVOFLOXACIN 750 MG/1
750 TABLET ORAL DAILY
Qty: 2 TABLET | Refills: 0 | Status: SHIPPED | OUTPATIENT
Start: 2019-08-20 | End: 2019-08-20 | Stop reason: HOSPADM

## 2019-08-20 RX ADMIN — IPRATROPIUM BROMIDE AND ALBUTEROL SULFATE 3 ML: .5; 3 SOLUTION RESPIRATORY (INHALATION) at 07:08

## 2019-08-20 RX ADMIN — CETIRIZINE HYDROCHLORIDE 10 MG: 10 TABLET, FILM COATED ORAL at 08:08

## 2019-08-20 RX ADMIN — SCOPALAMINE 1 PATCH: 1 PATCH, EXTENDED RELEASE TRANSDERMAL at 12:08

## 2019-08-20 RX ADMIN — PREDNISONE 50 MG: 20 TABLET ORAL at 08:08

## 2019-08-20 RX ADMIN — VILAZODONE HYDROCHLORIDE 40 MG: 10 TABLET ORAL at 08:08

## 2019-08-20 RX ADMIN — PREDNISONE 10 MG: 20 TABLET ORAL at 08:08

## 2019-08-20 RX ADMIN — HYDROCODONE BITARTRATE AND ACETAMINOPHEN 1 TABLET: 10; 325 TABLET ORAL at 05:08

## 2019-08-20 RX ADMIN — ONDANSETRON 4 MG: 4 TABLET, ORALLY DISINTEGRATING ORAL at 08:08

## 2019-08-20 NOTE — DISCHARGE SUMMARY
[facility] Hospital Medicine  Discharge Summary      Patient Name: Fernie Rice  MRN: [id number]  Admission Date: 8/15/2019  Hospital Length of Stay: 5 days  Discharge Date and Time:  08/20/2019 9:28 AM  Attending Physician: Luz Kendall MD   Discharging Provider: Brooks Chavis DO  Primary Care Provider: Fernie Garcia MD  [facility] Medicine Team: [facility] 2 Brooks Chavis DO    HPI:   Mr. Rice is a 17 yo male with history of atopic dermatitis, intermittent asthma, esophagitis and depression who presents with chief complaint of generalized body aches for 6 days. The aches occur mainly in his upper abdomen beneath his rib cage and upper back but have occurred elsewhere.  The pain is worse when he takes a deep breath and coughs, relieved by percocet taken once at home.  During the same time he has also had subjective fevers, chills, diaphoresis, decreased PO intake, occipital headaches, intermittent non-productive cough, and occasional shortness of breath related to pain. Beginning 2 days ago, he began to develop intermittent nausea and non-bloody, non-bilious vomiting. He had one episode of looser stool yesterday. He has not had congestion, runny nose, sore throat, rash, chest pain.  He said he has had one sick contact a few weeks ago with a friend who developed pneumonia.     In the ED, VS significant for patient febrile with Tm 102, tachycardic with , other VSS.  Labs were significant for WBC 17, UA positive for 11 RBC, Flu negative.  Chest xray obtained which revealed patchy and interstitial opacities bilaterally in mid and inferior lung zones concerning for atypical pneumonia. Otherwise there was no focal consolidation. He received doses of acetaminophen, ibuprofen and ketoralac for his pain with minimal improvement, 2L NS bolus, 1 dose of IV rochephin and 1 dose of azithromycin.     * No surgery found *      Hospital Course:   On 8/16 patient had 2 episodes of spiked fever, nausea  and tachycardia that was relieved by PRN tylenol and zofran, patient was breathing well and O2 sat remained normal on RA. Repeat CXR showed increased opacities and mediastinal LAD. On 8/17 he had a similar episode but de-saturated to 84% and was put on 3L NC and given duonebs. ABG was normal. Shortly afterwards patient desaturated to 87% while on 3L and was increased to 4L. Repeat CXR showed increased opacities with questionable pulmonary edema, CT chest confirmed that and suggested a possible inflammatory process. Pulm was consulted for assistance. Patient's respiratory status improving with scheduled duonebs, so likely just atypical PNA compounded by his asthma. HIV negative. Respiratory and blood cultures negative. Respiratory viral panel and Mycoplasma pending. ALEX negative and ANCA per pulm pending. Patient weaned off supplemental O2 and remained vitally stable on room air. Discharged with f/u with PCP, advised patient to switch from pediatrician to internist.     Review of Systems   Constitutional: Positive for appetite change and fatigue. Negative for chills, diaphoresis and fever.   HENT: Negative for congestion, rhinorrhea and sinus pressure.    Eyes: Negative for redness and visual disturbance.   Respiratory: Negative for shortness of breath. Negative for cough, chest tightness and wheezing.    Cardiovascular: Negative for chest pain and palpitations.   Gastrointestinal: Negative for nausea. Negative for abdominal pain, constipation, diarrhea and vomiting.   Genitourinary: Negative for difficulty urinating, dysuria and hematuria.   Musculoskeletal: Positive for back pain and myalgias. Negative for arthralgias.   Skin: Negative for pallor and rash.   Neurological: Negative for light-headedness and headaches.     Physical Exam   Constitutional: He is oriented to person, place, and time. He appears well-developed and well-nourished.   HENT:   Head: Normocephalic and atraumatic.   Nose: Nose normal.    Mouth/Throat: Oropharynx is clear and moist. No oropharyngeal exudate.   Eyes: Pupils are equal, round, and reactive to light. No scleral icterus.   Vesicular eruption approx. 1 cm wide present on inner canthus. No scleral or conjunctival injection. Rash is painful to palpation but no orbital pain on palpation. No discharge.  Neck: Normal range of motion. Neck supple. No JVD present.   Cardiovascular: Normal rate, regular rhythm, normal heart sounds and intact distal pulses. Exam reveals no gallop and no friction rub.   No murmur heard.  Pulmonary/Chest: Effort normal. No respiratory distress. He has no wheezes. He has no rales.   On room air  Abdominal: Soft. Bowel sounds are normal. He exhibits no distension. There is no tenderness.   Musculoskeletal: Normal range of motion. He exhibits no edema.   Lymphadenopathy:     He has no cervical adenopathy.   Neurological: He is alert and oriented to person, place, and time.   Skin: Skin is warm and dry. No rash noted.   Psychiatric: He has a normal mood and affect. His behavior is normal.     Consults:   Consults (From admission, onward)        Status Ordering Provider     Inpatient consult to Pulmonology  Once     Provider:  (Not yet assigned)    Completed JANESSA FUENTES     Pharmacy to dose Vancomycin consult  Once     Provider:  (Not yet assigned)    Completed JANESSA FUENTES          * Sepsis due to pneumonia  On admission patient tachycardic, febrile with fever 102, and elevated white count, SIRS 3/4. Given 2L IVF bolus and IV abx.    - See atypical pneumonia above    Acute hypoxemic respiratory failure  On 8/17 he had a similar episode as previously with fever, tachycardia and nausea but de-saturated to 84% and was put on 3L NC and given duonebs. ABG was normal. Shortly afterwards patient desaturated to 87% while on 3L and was increased to 4.5L, then down to 3L. Repeat lactic acid increased to 2.5. BNP normal 59. Repeat CXR showed increased opacities with  questionable pulmonary edema, CT chest confirmed that and suggested a possible inflammatory process but confirmed an atypical PNA with lymphadenopathy. Pulm was consulted, they agreed that this was likely atypical PNA which exacerbated his asthma, ARDS or hypersensitivity pneumonitis unlikely.    - RA this morning and O2 sat remains > 92%  - Continue duonebs q4 PRN    Asthma  On home on dupixent, certrizine, monteleukast and albuterol inhaler. Since being on dupixent for 2 years, he has only used his asthma inhaler a few times a year.    - Plan for discharge with home scheduled duonebs q4 and proair inhaler PRN  - Follow outpatient with a PCP. He currently has pediatrician but needs to make an appointment with internist    Acute midline thoracic back pain  Likely in the setting of lung infection causing pleural irritation given pain worse with coughing and breathing.    - Advised to use tylenol at home    Pneumonia due to infectious organism  Patient with cough, fevers, decreased PO intake, nausea, vomiting, generalized body aches for 6 days. CXR with interstitial and patchy opacities without focal consolidation consistent with atypical pneumonia. Mycoplasmae pneumoniae vs viral.  Possibly due to immunosuppression in the setting of dupixent biologic. Smoking history and asthma history also puts the patient at increased risk. Patient flu negative.     - De-escalated from IV vanc/cefepime to levaquin on 8/19. Respiratory and blood cultures have not grown organisms so we will do 7 day course of abx. First full day of abx on 8/16, so to finish 7 day course we will give levaquin for another 2 days outpatient. Will continue 5 days of prednisone 50 mg with 2 more doses outpatient  - M. Pneumoniae PCR, viral respiratory panel pending. HIV and ALEX negative. 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 Patient has history of HSV infection with cold sores on lip and forehead. He was given valacyclovir 1g PO BID at discharge and ophto consulted. Dr. Key will see him tomorrow 8/21 at 9:30am here to further assess.    Final Active Diagnoses:    Diagnosis Date Noted POA    PRINCIPAL PROBLEM:  Sepsis due to pneumonia [J18.9, A41.9] 08/16/2019 Yes    Acute hypoxemic respiratory failure [J96.01] 08/17/2019 Unknown    Asthma [J45.909] 08/19/2019 Unknown     Chronic    Acute midline thoracic back pain [M54.6] 08/16/2019 Yes    Pneumonia due to infectious organism [J18.9] 08/15/2019 Yes      Problems Resolved During this Admission:       Discharged Condition: good    Disposition: Home or Self Care    Follow Up:    Patient Instructions:   No discharge procedures on file.    Significant Diagnostic Studies: Labs:   CMP   Recent Labs   Lab 08/19/19  0721 08/20/19  0535    138   K 4.4 3.7    98   CO2 31* 29   * 92   BUN 17 14   CREATININE 0.7 0.7   CALCIUM 9.7 9.4   PROT 6.7 6.5   ALBUMIN 2.3* 2.2*   BILITOT 0.3 0.4   ALKPHOS 63 64   AST 41* 22   ALT 59* 61*   ANIONGAP 7* 11   ESTGFRAFRICA >60.0 >60.0   EGFRNONAA >60.0 >60.0    and CBC   Recent Labs   Lab 08/19/19  0721 08/20/19  0535   WBC 18.44* 11.44   HGB 10.7* 10.6*   HCT 33.1* 32.3*   * 499*       Pending Diagnostic Studies:     Procedure Component Value Units Date/Time    Anti-neutrophilic cytoplasmic antibody [[id number]] Collected:  08/18/19 1741    Order Status:  Sent Lab Status:  In process Updated:  08/18/19 1830    Specimen:  Blood     Mycoplasma Pneumoniae DNA Qualitiative RT-PCR [[id number]]     Order Status:  Sent Lab Status:  No result     Specimen:  Blood          Medications:  Reconciled Home Medications:      Medication List      START taking these medications    albuterol-ipratropium 2.5 mg-0.5 mg/3 mL  nebulizer solution  Commonly known as:  DUO-NEB  Take 3 mLs by nebulization every 4 (four) hours as needed for Wheezing or Shortness of Breath. Rescue     HYDROcodone-acetaminophen 5-325 mg per tablet  Commonly known as:  NORCO  Take 1 tablet by mouth every 8 (eight) hours as needed for Pain.     levoFLOXacin 750 MG tablet  Commonly known as:  LEVAQUIN  Take 1 tablet (750 mg total) by mouth once daily. for 2 doses     ondansetron 4 MG Tbdl  Commonly known as:  ZOFRAN-ODT  Dissolve 1 tablet (4 mg total) by mouth every 8 (eight) hours as needed.     predniSONE 10 MG tablet  Commonly known as:  DELTASONE  Take 5 tablets (50 mg total) by mouth once daily. for 2 doses        CHANGE how you take these medications    PROAIR HFA 90 mcg/actuation inhaler  Generic drug:  albuterol  Inhale 1-2 puffs into the lungs every 6 (six) hours as needed for Wheezing or Shortness of Breath. Rescue  What changed:    · See the new instructions.  · Another medication with the same name was removed. Continue taking this medication, and follow the directions you see here.        CONTINUE taking these medications    dextroamphetamine-amphetamine 20 mg tablet  Commonly known as:  ADDERALL  TK 1 T PO BID     VIIBRYD 40 mg Tab tablet  Generic drug:  vilazodone  TK 1 T PO  QD        STOP taking these medications    budesonide 0.5 mg/2 mL nebulizer solution  Commonly known as:  PULMICORT     cetirizine 10 MG tablet  Commonly known as:  ZYRTEC     emollient combination no.32 Nora  Commonly known as:  EPICERAM     levalbuterol 45 mcg/actuation inhaler  Commonly known as:  XOPENEX HFA     loratadine 10 mg tablet  Commonly known as:  CLARITIN     montelukast 10 mg tablet  Commonly known as:  SINGULAIR     olopatadine 0.2 % Drop  Commonly known as:  PATADAY     ondansetron 4 MG tablet  Commonly known as:  ZOFRAN     ONE DAILY MULTIVITAMIN ORAL     pimecrolimus 1 % cream  Commonly known as:  ELIDEL     valACYclovir 1000 MG tablet  Commonly known as:   VALTREX            Indwelling Lines/Drains at time of discharge:   Lines/Drains/Airways          None          Time spent on the discharge of patient: 40 minutes  Patient was seen and examined on the date of discharge and determined to be suitable for discharge.         Brooks Chavis DO  Department of Hospital Medicine  [facility]

## 2019-08-20 NOTE — ASSESSMENT & PLAN NOTE
On 8/20 patient awoke with right inner canthus vesicular rash. Patient has history of HSV infection with cold sores on lip and forehead. He was given valacyclovir 1g PO BID at discharge and optho consulted. Dr. Key will see him tomorrow 8/21 at 9:30am here to further assess.

## 2019-08-20 NOTE — PLAN OF CARE
08/20/19 1248   Final Note   Assessment Type Final Discharge Note   Anticipated Discharge Disposition Home   What phone number can be called within the next 1-3 days to see how you are doing after discharge? [phone]   Hospital Follow Up  Appt(s) scheduled? Yes   Discharge plans and expectations educations in teach back method with documentation complete? Yes   Right Care Referral Info   Post Acute Recommendation No Care

## 2019-08-20 NOTE — ASSESSMENT & PLAN NOTE
On 8/17 he had a similar episode as previously with fever, tachycardia and nausea but de-saturated to 84% and was put on 3L NC and given duonebs. ABG was normal. Shortly afterwards patient desaturated to 87% while on 3L and was increased to 4.5L, then down to 3L. Repeat lactic acid increased to 2.5. BNP normal 59. Repeat CXR showed increased opacities with questionable pulmonary edema, CT chest confirmed that and suggested a possible inflammatory process but confirmed an atypical PNA with lymphadenopathy. Pulm was consulted, they agreed that this was likely atypical PNA which exacerbated his asthma, ARDS or hypersensitivity pneumonitis unlikely.    - RA this morning and O2 sat remains > 92%  - Continue duonebs q4 PRN

## 2019-08-20 NOTE — ASSESSMENT & PLAN NOTE
Patient with cough, fevers, decreased PO intake, nausea, vomiting, generalized body aches for 6 days. CXR with interstitial and patchy opacities without focal consolidation consistent with atypical pneumonia. Mycoplasmae pneumoniae vs viral.  Possibly due to immunosuppression in the setting of dupixent biologic. Smoking history and asthma history also puts the patient at increased risk. Patient flu negative.     - De-escalated from IV vanc/cefepime to levaquin on 8/19. Respiratory and blood cultures have not grown organisms so we will do 7 day course of abx. First full day of abx on 8/16, so to finish 7 day course we will give levaquin for another 2 days outpatient. Will continue 5 days of prednisone 50 mg with 2 more doses outpatient  - M. Pneumoniae PCR, viral respiratory panel pending. HIV and ALEX negative. ANCA pending  - Follow up blood cultures and tailor antibiotic therapy appropriately if positive  - Pulm consulted   - See acute hypoxemic respiratory failure above

## 2019-08-20 NOTE — ASSESSMENT & PLAN NOTE
On admission patient tachycardic, febrile with fever 102, and elevated white count, SIRS 3/4. Given 2L IVF bolus and IV abx.    - See atypical pneumonia above

## 2019-08-20 NOTE — ASSESSMENT & PLAN NOTE
On home on dupixent, certrizine, monteleukast and albuterol inhaler. Since being on dupixent for 2 years, he has only used his asthma inhaler a few times a year.    - Plan for discharge with home scheduled duonebs q4 and proair inhaler PRN  - Follow outpatient with a PCP. He currently has pediatrician but needs to make an appointment with internist

## 2019-08-20 NOTE — NURSING
Discharge instructions given to patient and family.  Patient verbalized understanding and had no further questions. Patient's IV removed.  Vital signs within normal limits.  Patient refused wheelchair to go home.

## 2019-08-20 NOTE — NURSING
Patient refused levoflaxacin 750 mg IVPB because he wanted to be discharged.  He didn't want to stay the 90 minutes the infusion would have taken.  Patient requested the levoflaxacin be changed to 250 mg pills and called in to the [facility] pharmacy of their choice.  Called MD.

## 2019-08-20 NOTE — PHYSICIAN QUERY
"PT Name: Fernie Rice  MR #: [id number]    Physician Query Form - Respiratory Condition Clarification      CDS/: Elmira Wilson RN CDi         Contact information:  [email or web address]    This form is a permanent document in the medical record.    Query Date: August 20, 2019    By submitting this query, we are merely seeking further clarification of documentation. Please utilize your independent clinical judgment when addressing the question(s) below.    The Medical record contains the following   Indicators   Supporting Clinical Findings Location in Medical Record   X   SOB, HERNANDEZ, Wheezing, Productive Cough, Use of Accessory Muscles, etc. Called by bedside RN to evaluate patient. Per bedside RN, patient has fever, dyspnea, tachypnea, and O2 sat 90%. Upon arrival to the room, patient is lying in bed, appears uncomfortable, converses appropriately, states, "I feel like crap." Patient received neb treatment approximately an hour ago, states he feels no relief from this. Temp 101.6, , O2 sat 94% on 3.5L per NC.   Disposition: Remain in room 624.    Respiratory: Positive for cough (nonproductive), chest tightness and shortness of breath. Negative for wheezing.   Code & Rapid response 8/17 1156 am   MISAEL Boyd RN              [facility] medicine 8/16 234 pm  C Reg BRYSON / UBRAK Schwartz MD   X   Acute/Chronic Illness Atypical pneumonia  Sepsis due to pneumonia  Hx. Asthma       X   Radiology Findings There is new irregular patchy and streaky opacities in the mid and inferior lung.  This likely represents atypical pneumonia.  No discrete lobar consolidation seen.  No nodule or cavitary focus.   Chest xray 8/15   X   Respiratory Distress or Failure Acute hypoxemic respiratory failure  On 8/17 he had a similar episode as previously with fever, tachycardia and nausea but de-saturated to 84% and was put on 3L NC and given duonebs. ABG was normal. Shortly afterwards patient desaturated to 87% while on 3L and was increased to " 4.5L, then down to 3L. Repeat lactic acid increased to 2.5. BNP normal 59. Repeat CXR showed increased opacities with questionable pulmonary edema, CT chest confirmed that and suggested a possible inflammatory process. Pulm was consulted.     - Escalated abx from ceftriaxone/azithromycin to vancomycin/cefepime  - Changed duonebs from PRN to scheduled q4  - Pulm consulted and I spoke to them about the patient, awaiting recs  - Holding off on giving fluids as there is pulmonary edema on the CXR, but also holding off on diuretics as patient has atypical PNA (for which the treatment is fluids)  - Most recently sat 98% on 3L NC, afebrile  - Vital signs q4    Acute hypoxemic respiratory failure  - recommend goal of spO2 >92%. Recommend weaning as able  ok to continue duonebs (pt does have h/o asthma, but not actively wheezing) [facility] medicine 8/17 627 pm  NATASHA Chavis MD / FELICITAS Connelly MD                                          Pulmonary consult   8/18 659 pm  SUSHIL Rebolledo/FELICITAS Flannery MD      Hypoxia or Hypercapnia     X   RR         ABGs         O2 sat Blood gas 8/16  POC PH 7.400   POC PCO2 42.3   POC PO2 113High    POC HCO3 26.2   POC BE 1   POC SATURATED O2  98   POC TCO2  27   Sample - ARTERIAL  Nasal cannula                           RR      Sat   8/15   0701    20        98  8/16   0701   18         95  8/17   0701   16         95  8/18   0701   18         98  8/19   0701   14         95  8/20   0701   18         92   Lab results - ABGs      BiPAP/Intubation     X   Supplemental O2 8/16, 8/17 - 3 L NC  8/18 - 2 L NC  8/19 - 0.5 L NC  8/19 - room air    8/17  - Shortly afterwards patient desaturated to 87% while on 3L and was increased to 4.5L, then down to 3L. Repeat lactic acid increased to 2.5 Oxygen flowsheet          [facility] medicine 8/17 627 pm  NATASHA Chavis MD / FELICITAS Connelly MD      Home O2, Oxygen Dependence     X   Treatment ok to continue duonebs (pt does have h/o asthma, but not actively wheezing) Pulmonary  consult   8/18 659 pm  SUSHIL Rebolledo/FELICITAS Flannery MD      Other       Respiratory failure can be acute, chronic or both. It is generally further specified as hypoxic, hypercapnic or both. Lastly, it is important to identify an etiology, if known or suspected.   References::  https://www.acphospitalist.org/archives/2013/10/coding.htm http://[employer]/acute-respiratory-failure-know     The clinical guidelines noted below are only system guidelines, and do not replace the providers clinical judgment.    Provider, please specify diagnosis or diagnoses associated with above clinical findings.   [   ] Acute Respiratory Insufficiency - Generally describes less severe respiratory symptoms and measurements (pO2, SpO2, pH, and pCO2) NOT meeting criteria for respiratory failure      [   ]  Acute Respiratory Distress - Generally describes less severe respiratory symptoms (tachypnea, in respiratory distress, increased work of breathing, unable to speak in complete sentences, labored breathing, use of accessory muscles, RR> 24, cyanosis, dyspnea, wheezing, stridor, lethargy) without sufficient measurements (pO2, SpO2, pH, and pCO2) to meet criteria for respiratory failure    [X   ] Acute Respiratory Failure with Hypoxia - ABG pO2 < 60 mmHg or O2 sat of 88% on RA and respiratory symptoms documented     [   ] Hypoxia Only     [   ] Other Respiratory Diagnosis (please specify): _________________________________     [   ]  Clinically Undetermined       Please document in your progress notes daily for the duration of treatment until resolved and include in your discharge summary.

## 2019-08-20 NOTE — NURSING
Patient refused valacyclovir because the pills were too big for him to swallow.  Patient requested a prescription for smaller pills be called in to the [facility]'s pharmacy of his choice.  Called MD.

## 2019-08-20 NOTE — ASSESSMENT & PLAN NOTE
Likely in the setting of lung infection causing pleural irritation given pain worse with coughing and breathing.    - Advised to use tylenol at home

## 2019-08-21 ENCOUNTER — OFFICE VISIT (OUTPATIENT)
Dept: OPHTHALMOLOGY | Facility: CLINIC | Age: 19
End: 2019-08-21
Payer: COMMERCIAL

## 2019-08-21 DIAGNOSIS — H18.611 KERATOCONUS, STABLE, RIGHT EYE: ICD-10-CM

## 2019-08-21 DIAGNOSIS — B00.59: Primary | ICD-10-CM

## 2019-08-21 PROCEDURE — 92002 PR EYE EXAM, NEW PATIENT,INTERMED: ICD-10-PCS | Mod: S$GLB,,, | Performed by: OPHTHALMOLOGY

## 2019-08-21 PROCEDURE — 92002 INTRM OPH EXAM NEW PATIENT: CPT | Mod: S$GLB,,, | Performed by: OPHTHALMOLOGY

## 2019-08-21 PROCEDURE — 99999 PR PBB SHADOW E&M-EST. PATIENT-LVL III: ICD-10-PCS | Mod: PBBFAC,,, | Performed by: OPHTHALMOLOGY

## 2019-08-21 PROCEDURE — 99999 PR PBB SHADOW E&M-EST. PATIENT-LVL III: CPT | Mod: PBBFAC,,, | Performed by: OPHTHALMOLOGY

## 2019-08-21 RX ORDER — ACYCLOVIR 200 MG/5ML
800 SUSPENSION ORAL 3 TIMES DAILY
Qty: 420 ML | Refills: 0 | Status: ON HOLD | OUTPATIENT
Start: 2019-08-21 | End: 2020-02-06 | Stop reason: HOSPADM

## 2019-08-21 NOTE — PROGRESS NOTES
HPI     Eye Problem      Additional comments: HSV (herpes simplex virus) infection of eyelid               Comments     Triage pt  Patient here for EDfu HSV (herpes simplex virus) infection of right   eyelid.  Pt states bumps LLL x 2-3 days which seem to be getting worse per mother   of patient.  Patient states OS itchy, painful and burning.   Hx of OD vision always imperfect, but no change.  4 on pain scale.    I have personally interviewed the patient, reviewed the history and   examined the patient and agree with the technician's exam.          Last edited by Vasile Key MD on 8/21/2019  9:53 AM. (History)            Assessment /Plan     For exam results, see Encounter Report.    Dermatitis of right eyelid due to herpes simplex virus (HSV)  -     acyclovir (ZOVIRAX) 200 mg/5 mL suspension; Take 20 mLs (800 mg total) by mouth 3 (three) times daily. for 7 days  Dispense: 420 mL; Refill: 0    Keratoconus, stable, right eye      Seen with Dr. White. Take medication as prescribed. Return in one week or sooner if concerns. Follow up with Dr. White in one to two months regarding keratoconus.

## 2019-08-21 NOTE — PATIENT INSTRUCTIONS
Take medication as prescribed for full week.  Repeat exam in one week.  See Dr. White in one to two months regarding keratoconus.

## 2019-08-22 ENCOUNTER — PATIENT OUTREACH (OUTPATIENT)
Dept: ADMINISTRATIVE | Facility: CLINIC | Age: 19
End: 2019-08-22

## 2019-08-22 LAB
BACTERIA BLD CULT: NORMAL
BACTERIA BLD CULT: NORMAL

## 2019-08-22 RX ORDER — NAPROXEN SODIUM 220 MG
220 TABLET ORAL
COMMUNITY
End: 2019-12-31

## 2019-08-22 NOTE — PATIENT INSTRUCTIONS
Discharge Instructions for Pneumonia  You have been diagnosed with pneumonia, a serious lung infection. Most cases of pneumonia are caused by bacteria. Pneumonia most often occurs in older adults, young children, and people with chronic health problems.  Home Care  Take your medication exactly as directed. Dont skip doses. Continue taking your antibiotics as directed until they are all gone--even if you start to feel better. This will prevent the pneumonia from coming back.  Drink at least 8 glasses of water daily, unless directed otherwise. This helps to loosen and thin secretions so that you can cough them up.  Use a cool-mist humidifier in your bedroom. Be sure to clean the humidifier daily.  Coughing up mucus is normal. Dont use medications to suppress your cough unless your cough is dry, painful, or interferes with your sleep. You may use an expectorant if ordered by your doctor.  Warm compresses or a moist heating pad on the lowest setting can be used to relieve chest discomfort. Use several times a day for 15-20 minutes at a time. (To prevent injuring your skin, be sure the temperature of the compress or heating pad is warm, not hot.)  Get plenty of rest until your fever, shortness of breath, and chest pain go away.  Plan to get a flu shot every year.  Ask your doctor about a pneumonia vaccination.  Follow-Up  Make a follow-up appointment as directed by our staff.    When to Seek Medical Attention  Call 911 right away if you have any of the following:  Chest pain  Trouble breathing  Blue lips or fingernails  Otherwise, call your doctor if you have any of the following:  Fever above 101.5°F  Yellow, green, bloody, or smelly sputum  More than normal mucus production  Vomiting   © 7758-1198 [employer], [address] [place] [address], [place] [address]. All rights reserved. This information is not intended as a substitute for professional medical care. Always follow your healthcare professional's instructions.

## 2019-08-28 ENCOUNTER — OFFICE VISIT (OUTPATIENT)
Dept: OPHTHALMOLOGY | Facility: CLINIC | Age: 19
End: 2019-08-28
Payer: COMMERCIAL

## 2019-08-28 DIAGNOSIS — B00.59: Primary | ICD-10-CM

## 2019-08-28 PROCEDURE — 99999 PR PBB SHADOW E&M-EST. PATIENT-LVL III: CPT | Mod: PBBFAC,,, | Performed by: OPHTHALMOLOGY

## 2019-08-28 PROCEDURE — 92012 PR EYE EXAM, EST PATIENT,INTERMED: ICD-10-PCS | Mod: S$GLB,,, | Performed by: OPHTHALMOLOGY

## 2019-08-28 PROCEDURE — 99999 PR PBB SHADOW E&M-EST. PATIENT-LVL III: ICD-10-PCS | Mod: PBBFAC,,, | Performed by: OPHTHALMOLOGY

## 2019-08-28 PROCEDURE — 92012 INTRM OPH EXAM EST PATIENT: CPT | Mod: S$GLB,,, | Performed by: OPHTHALMOLOGY

## 2019-08-28 NOTE — PROGRESS NOTES
HPI     DLS:08/21/2019 Shahid  Patient here for 1 week follow up for Dermatitis right eyelid.  Pt states OD feels fine. Finished Acyclovir.  No eye pain.    I have personally interviewed the patient, reviewed the history and   examined the patient and agree with the technician's exam.    Last edited by Vasile Key MD on 8/28/2019  1:43 PM. (History)            Assessment /Plan     For exam results, see Encounter Report.    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 Rice has no residual evidence of his shingles. He will see Dr. 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

## 2019-10-25 ENCOUNTER — TELEPHONE (OUTPATIENT)
Dept: PULMONOLOGY | Facility: CLINIC | Age: 19
End: 2019-10-25

## 2019-10-25 DIAGNOSIS — J45.909 ASTHMA, UNSPECIFIED ASTHMA SEVERITY, UNSPECIFIED WHETHER COMPLICATED, UNSPECIFIED WHETHER PERSISTENT: Primary | ICD-10-CM

## 2019-11-04 ENCOUNTER — HOSPITAL ENCOUNTER (OUTPATIENT)
Dept: RADIOLOGY | Facility: HOSPITAL | Age: 19
Discharge: HOME | End: 2019-11-04
Attending: INTERNAL MEDICINE
Payer: COMMERCIAL

## 2019-11-04 ENCOUNTER — OFFICE VISIT (OUTPATIENT)
Dept: PULMONOLOGY | Facility: CLINIC | Age: 19
End: 2019-11-04
Payer: COMMERCIAL

## 2019-11-04 VITALS
HEIGHT: 68 IN | BODY MASS INDEX: 25.33 KG/M2 | OXYGEN SATURATION: 96 % | WEIGHT: 167.13 LBS | DIASTOLIC BLOOD PRESSURE: 70 MMHG | SYSTOLIC BLOOD PRESSURE: 120 MMHG | HEART RATE: 88 BPM

## 2019-11-04 DIAGNOSIS — J18.9 PNEUMONIA DUE TO INFECTIOUS ORGANISM, UNSPECIFIED LATERALITY, UNSPECIFIED PART OF LUNG: ICD-10-CM

## 2019-11-04 DIAGNOSIS — J18.9 PNEUMONIA DUE TO INFECTIOUS ORGANISM, UNSPECIFIED LATERALITY, UNSPECIFIED PART OF LUNG: Primary | ICD-10-CM

## 2019-11-04 DIAGNOSIS — Z72.0 TOBACCO ABUSE: ICD-10-CM

## 2019-11-04 PROCEDURE — 99999 PR PBB SHADOW E&M-EST. PATIENT-LVL III: CPT | Mod: PBBFAC,,, | Performed by: INTERNAL MEDICINE

## 2019-11-04 PROCEDURE — 3008F BODY MASS INDEX DOCD: CPT | Mod: CPTII,S$GLB,, | Performed by: INTERNAL MEDICINE

## 2019-11-04 PROCEDURE — 99999 PR PBB SHADOW E&M-EST. PATIENT-LVL III: ICD-10-PCS | Mod: PBBFAC,,, | Performed by: INTERNAL MEDICINE

## 2019-11-04 PROCEDURE — 71046 X-RAY EXAM CHEST 2 VIEWS: CPT | Mod: 26,,, | Performed by: RADIOLOGY

## 2019-11-04 PROCEDURE — 99406 BEHAV CHNG SMOKING 3-10 MIN: CPT | Mod: S$GLB,,, | Performed by: INTERNAL MEDICINE

## 2019-11-04 PROCEDURE — 71046 XR CHEST PA AND LATERAL: ICD-10-PCS | Mod: 26,,, | Performed by: RADIOLOGY

## 2019-11-04 PROCEDURE — 99213 PR OFFICE/OUTPT VISIT, EST, LEVL III, 20-29 MIN: ICD-10-PCS | Mod: S$GLB,,, | Performed by: INTERNAL MEDICINE

## 2019-11-04 PROCEDURE — 99213 OFFICE O/P EST LOW 20 MIN: CPT | Mod: S$GLB,,, | Performed by: INTERNAL MEDICINE

## 2019-11-04 PROCEDURE — 3008F PR BODY MASS INDEX (BMI) DOCUMENTED: ICD-10-PCS | Mod: CPTII,S$GLB,, | Performed by: INTERNAL MEDICINE

## 2019-11-04 PROCEDURE — 71046 X-RAY EXAM CHEST 2 VIEWS: CPT | Mod: TC

## 2019-11-04 PROCEDURE — 99406 PR TOBACCO USE CESSATION INTERMEDIATE 3-10 MINUTES: ICD-10-PCS | Mod: S$GLB,,, | Performed by: INTERNAL MEDICINE

## 2019-11-04 NOTE — LETTER
November 5, 2019      Luz Kendall MD  [address]  [facility] [address]           [facility]  [address]  [facility] [address]  Phone: [phone]          Patient: Fernie Rice   MR Number: [id number]   YOB: 2000   Date of Visit: 11/4/2019       Dear Dr. Luz Kendall:    Thank you for referring Fernie Rice to me for evaluation. Attached you will find relevant portions of my assessment and plan of care.    If you have questions, please do not hesitate to call me. I look forward to following Fernie Rice along with you.    Sincerely,    Harshil Amador MD    Enclosure  CC:  No Recipients    If you would like to receive this communication electronically, please contact [email or web address] or [phone] to request more information on [employer] Link access.    For providers and/or their staff who would like to refer a patient to [facility], please contact us through our one-stop-shop provider referral line, [facility], at [phone].    If you feel you have received this communication in error or would no longer like to receive these types of communications, please e-mail [email or web address]

## 2019-11-04 NOTE — PROGRESS NOTES
"Subjective:       Patient ID: Fernie Rice is a 19 y.o. male.    Chief Complaint: Asthma     17 yo male with history of atopic dermatitis, intermittent asthma, esophagitis and depression who presents with chief complaint of generalized body aches for 6 days, fever, chills, cough/sob. He states that he thought he had the flu. He actually went to urgent care who tested him and said he didn't have the flu and told him it was likely viral and sent him home. He denies any sick contacts. He presented to the ED where he was found to be febrile with temp 102, tachycardic, had leukocytosis of 17. Chest xray obtained which revealed patchy and interstitial opacities bilaterally in mid and inferior lung zones concerning for atypical pneumonia. Otherwise there was no focal consolidation. He was started on broad spectrum abx and admitted. While on the floor, pt started having increasing oxygen requirements. CT chest was done showing b/l opacities diffuse in both lung fields. Pulmonary was thus consulted for further recommendations. 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 He denies any bet birds. Denies any recent travel. Denies any sick contacts.     Interval hx: Patient returns for hospital follow up. All his symptoms have resolved.   No acute issues.     Review of Systems   Respiratory: Negative for cough, sputum production, shortness of breath and wheezing.        Objective:       Vitals:    11/04/19 1553   BP: 120/70   Pulse: 88   SpO2: 96%   Weight: 75.8 kg (167 lb 1.7 oz)   Height: 5' 8" (1.727 m)       Physical Exam   Constitutional: He is oriented to person, place, and time. He appears well-developed and well-nourished.   Cardiovascular: Normal rate and regular rhythm.   Pulmonary/Chest: Normal expansion, symmetric chest wall expansion and breath sounds normal. He has no wheezes. He has no rales.   Neurological: He is alert and " oriented to person, place, and time.   Nursing note and vitals reviewed.    Personal Diagnostic Review  Chest x-ray: No acute processes.   No flowsheet data found.      Assessment:       1. Pneumonia due to infectious organism, unspecified laterality, unspecified part of lung    2. Tobacco abuse        Outpatient Encounter Medications as of 11/4/2019   Medication Sig Dispense Refill    dextroamphetamine-amphetamine (ADDERALL) 20 mg tablet TK 1 T PO BID  0    PROAIR HFA 90 mcg/actuation inhaler Inhale 1-2 puffs into the lungs every 6 (six) hours as needed for Wheezing or Shortness of Breath. Rescue 18 g 0    acyclovir (ZOVIRAX) 200 mg/5 mL suspension Take 20 mLs (800 mg total) by mouth 3 (three) times daily. for 7 days 420 mL 0    albuterol-ipratropium (DUO-NEB) 2.5 mg-0.5 mg/3 mL nebulizer solution Take 3 mLs by nebulization every 4 (four) hours as needed for Wheezing or Shortness of Breath. Rescue 180 mL 0    HYDROcodone-acetaminophen (NORCO) 5-325 mg per tablet Take 1 tablet by mouth every 8 (eight) hours as needed for Pain. (Patient not taking: Reported on 11/4/2019) 5 tablet 0    naproxen sodium (ANAPROX) 220 MG tablet Take 220 mg by mouth every 12 (twelve) hours.      ondansetron (ZOFRAN-ODT) 4 MG TbDL Dissolve 1 tablet (4 mg total) by mouth every 8 (eight) hours as needed. (Patient not taking: Reported on 11/4/2019) 21 tablet 0    VIIBRYD 40 mg Tab tablet TK 1 T PO  QD  3     No facility-administered encounter medications on file as of 11/4/2019.      Orders Placed This Encounter   Procedures    X-Ray Chest PA And Lateral     Standing Status:   Future     Number of Occurrences:   1     Standing Expiration Date:   11/4/2020     Order Specific Question:   May the Radiologist modify the order per protocol to meet the clinical needs of the patient?     Answer:   Yes       Plan:       Pneumonia due to infectious organism  Patient's PNA likely secondary to vaping. His symptoms have now resolved.  His CXR is  without acute processes.   All cultures from the hospitalization are negative.     Tobacco abuse  Counseled > 5 minutes on the hazards of vaping including acute lung injury.    Follow up as needed.     Harshil Amador MD

## 2019-11-05 PROBLEM — Z72.0 TOBACCO ABUSE: Status: ACTIVE | Noted: 2019-11-05

## 2019-11-05 PROBLEM — J45.909 ASTHMA: Chronic | Status: RESOLVED | Noted: 2019-08-19 | Resolved: 2019-11-05

## 2019-11-05 NOTE — ASSESSMENT & PLAN NOTE
Patient's PNA likely secondary to vaping. His symptoms have now resolved.  His CXR is without acute processes.   All cultures from the hospitalization are negative.

## 2019-12-31 ENCOUNTER — ANESTHESIA EVENT (OUTPATIENT)
Dept: SURGERY | Facility: OTHER | Age: 19
End: 2019-12-31
Payer: COMMERCIAL

## 2019-12-31 ENCOUNTER — HOSPITAL ENCOUNTER (OUTPATIENT)
Dept: PREADMISSION TESTING | Facility: OTHER | Age: 19
Discharge: HOME | End: 2019-12-31
Attending: ORTHOPAEDIC SURGERY
Payer: COMMERCIAL

## 2019-12-31 VITALS
TEMPERATURE: 98 F | HEIGHT: 68 IN | BODY MASS INDEX: 22.73 KG/M2 | SYSTOLIC BLOOD PRESSURE: 134 MMHG | WEIGHT: 150 LBS | OXYGEN SATURATION: 97 % | DIASTOLIC BLOOD PRESSURE: 78 MMHG | HEART RATE: 89 BPM

## 2019-12-31 RX ORDER — ACETAMINOPHEN 500 MG
1000 TABLET ORAL
Status: CANCELLED | OUTPATIENT
Start: 2019-12-31 | End: 2019-12-31

## 2019-12-31 RX ORDER — PREGABALIN 75 MG/1
75 CAPSULE ORAL
Status: CANCELLED | OUTPATIENT
Start: 2019-12-31 | End: 2019-12-31

## 2019-12-31 RX ORDER — FAMOTIDINE 20 MG/1
20 TABLET, FILM COATED ORAL
Status: CANCELLED | OUTPATIENT
Start: 2019-12-31 | End: 2019-12-31

## 2019-12-31 RX ORDER — SODIUM CHLORIDE, SODIUM LACTATE, POTASSIUM CHLORIDE, CALCIUM CHLORIDE 600; 310; 30; 20 MG/100ML; MG/100ML; MG/100ML; MG/100ML
INJECTION, SOLUTION INTRAVENOUS CONTINUOUS
Status: CANCELLED | OUTPATIENT
Start: 2019-12-31

## 2019-12-31 NOTE — ANESTHESIA PREPROCEDURE EVALUATION
12/31/2019  Fernie Rice is a 19 y.o., male.    Anesthesia Evaluation    I have reviewed the Patient Summary Reports.    I have reviewed the Nursing Notes.   I have reviewed the Medications.     Review of Systems  Anesthesia Hx:  No problems with previous Anesthesia  Denies Family Hx of Anesthesia complications.   Denies Personal Hx of Anesthesia complications.   Social:  Smoker    Hematology/Oncology:  Hematology Normal   Oncology Normal     Cardiovascular:  Cardiovascular Normal     Pulmonary:   Asthma Seasonal asthma with very rare attacks   Renal/:  Renal/ Normal     Hepatic/GI:  Hepatic/GI Normal    Musculoskeletal:  Musculoskeletal Normal    Neurological:  Neurology Normal    Endocrine:  Endocrine Normal        Physical Exam  General:  Large Soriano, Well nourished    Airway/Jaw/Neck:  Airway Findings: Mouth Opening: Normal Tongue: Normal  General Airway Assessment: Adult  Mallampati: I         Dental:  Dental Findings: In tact             Anesthesia Plan  Type of Anesthesia, risks & benefits discussed:  Anesthesia Type:  general  Patient's Preference:   Intra-op Monitoring Plan: standard ASA monitors  Intra-op Monitoring Plan Comments:   Post Op Pain Control Plan: per primary service following discharge from PACU, peripheral nerve block and multimodal analgesia  Post Op Pain Control Plan Comments:   Induction:    Beta Blocker:         Informed Consent: Patient understands risks and agrees with Anesthesia plan.  Questions answered. Anesthesia consent signed with patient.  ASA Score: 2     Day of Surgery Review of History & Physical:    H&P update referred to the surgeon.     Anesthesia Plan Notes: Regional plus LMA        Ready For Surgery From Anesthesia Perspective.

## 2019-12-31 NOTE — DISCHARGE INSTRUCTIONS
PRE-ADMIT TESTING -  [phone]    [address]  [place] [facility]          Your surgery has been scheduled at [facility]. We are pleased to have the opportunity to serve you. For Further Information please call [phone].    On the day of surgery please report to the Information Desk on the 1st floor.    · CONTACT YOUR PHYSICIAN'S OFFICE THE DAY PRIOR TO YOUR SURGERY TO OBTAIN YOUR ARRIVAL TIME.     · The evening before surgery do not eat anything after 9 p.m. ( this includes hard candy, chewing gum and mints).  You may only have GATORADE, POWERADE AND WATER  from 9 p.m. until you leave your home.   DO NOT DRINK ANY LIQUIDS ON THE WAY TO THE HOSPITAL.      SPECIAL MEDICATION INSTRUCTIONS: TAKE medications checked off by the Anesthesiologist on your Medication List.    Angiogram Patients: Take medications as instructed by your physician, including aspirin.     Surgery Patients:    If you take ASPIRIN - Your PHYSICIAN/SURGEON will need to inform you IF/OR when you need to stop taking aspirin prior to your surgery.     Do Not take any medications containing IBUPROFEN.  Do Not Wear any make-up or dark nail polish   (especially eye make-up) to surgery. If you come to surgery with makeup on you will be required to remove the makeup or nail polish.    Do not shave your surgical area at least 5 days prior to your surgery. The surgical prep will be performed at the hospital according to Infection Control regulations.    Leave all valuables at home.   Do Not wear any jewelry or watches, including any metal in body piercings. Jewelry must be removed prior to coming to the hospital.  There is a possibility that rings that are unable to be removed may be cut off if they are on the surgical extremity.    Contact Lens must be removed before surgery. Either do not wear the contact lens or bring a case and solution for storage.  Please bring a container for eyeglasses or dentures as required.  Bring  any paperwork your physician has provided, such as consent forms,  history and physicals, doctor's orders, etc.   Bring comfortable clothes that are loose fitting to wear upon discharge. Take into consideration the type of surgery being performed.  Maintain your diet as advised per your physician the day prior to surgery.      Adequate rest the night before surgery is advised.   Park in the Parking lot behind the hospital or in the [facility] across the street from the parking lot. Parking is complimentary.  If you will be discharged the same day as your procedure, please arrange for a responsible adult to drive you home or to accompany you if traveling by taxi.   YOU WILL NOT BE PERMITTED TO DRIVE OR TO LEAVE THE HOSPITAL ALONE AFTER SURGERY.   It is strongly recommended that you arrange for someone to remain with you for the first 24 hrs following your surgery.    The Surgeon will speak to your family/visitor after your surgery regarding the outcome of your surgery and post op care.  The Surgeon may speak to you after your surgery, but there is a possibility you may not remember the details.  Please check with your family members regarding the conversation with the Surgeon.    We strongly recommend whoever is bringing you home be present for discharge instructions.  This will ensure a thorough understanding for your post op home care.    EACH PATIENT IS ALLOWED TWO FAMILY MEMBERS OR VISITORS IN THE ROOM AND IN THE WAITING ROOMS WHILE YOU ARE IN SURGERY. ALL CHILDREN MUST ALWAYS BE ACCOMPANIED BY AN ADULT.    Thank you for your cooperation.  The Staff of [facility].                Bathing Instructions with Hibiclens     Shower the evening before and morning of your procedure with Hibiclens:   Wash your face with water and your regular face wash/soap   Apply Hibiclens directly on your skin or on a wet washcloth and wash gently. When showering: Move away from the shower stream when  applying Hibiclens to avoid rinsing off too soon.   Rinse thoroughly with warm water   Do not dilute Hibiclens         Dry off as usual, do not use any deodorant, powder, body lotions, perfume, after shave or cologne.

## 2020-01-02 ENCOUNTER — ANESTHESIA (OUTPATIENT)
Dept: SURGERY | Facility: OTHER | Age: 20
End: 2020-01-02
Payer: COMMERCIAL

## 2020-01-02 ENCOUNTER — HOSPITAL ENCOUNTER (OUTPATIENT)
Facility: OTHER | Age: 20
Discharge: HOME | End: 2020-01-02
Attending: ORTHOPAEDIC SURGERY | Admitting: ORTHOPAEDIC SURGERY
Payer: COMMERCIAL

## 2020-01-02 VITALS
WEIGHT: 150 LBS | DIASTOLIC BLOOD PRESSURE: 86 MMHG | RESPIRATION RATE: 16 BRPM | HEIGHT: 68 IN | OXYGEN SATURATION: 98 % | BODY MASS INDEX: 22.73 KG/M2 | SYSTOLIC BLOOD PRESSURE: 132 MMHG | HEART RATE: 79 BPM | TEMPERATURE: 98 F

## 2020-01-02 DIAGNOSIS — S62.316A CLOSED DISPLACED FRACTURE OF BASE OF FIFTH METACARPAL BONE OF RIGHT HAND, INITIAL ENCOUNTER: Primary | ICD-10-CM

## 2020-01-02 PROCEDURE — 71000015 HC POSTOP RECOV 1ST HR: Performed by: ORTHOPAEDIC SURGERY

## 2020-01-02 PROCEDURE — 63600175 PHARM REV CODE 636 W HCPCS: Performed by: ANESTHESIOLOGY

## 2020-01-02 PROCEDURE — 64415 NJX AA&/STRD BRCH PLXS IMG: CPT | Mod: 59 | Performed by: ANESTHESIOLOGY

## 2020-01-02 PROCEDURE — 63600175 PHARM REV CODE 636 W HCPCS: Performed by: NURSE ANESTHETIST, CERTIFIED REGISTERED

## 2020-01-02 PROCEDURE — 36000706: Performed by: ORTHOPAEDIC SURGERY

## 2020-01-02 PROCEDURE — 25000003 PHARM REV CODE 250: Performed by: ANESTHESIOLOGY

## 2020-01-02 PROCEDURE — 37000009 HC ANESTHESIA EA ADD 15 MINS: Performed by: ORTHOPAEDIC SURGERY

## 2020-01-02 PROCEDURE — 71000039 HC RECOVERY, EACH ADD'L HOUR: Performed by: ORTHOPAEDIC SURGERY

## 2020-01-02 PROCEDURE — 76942 ECHO GUIDE FOR BIOPSY: CPT | Performed by: ANESTHESIOLOGY

## 2020-01-02 PROCEDURE — 36000707: Performed by: ORTHOPAEDIC SURGERY

## 2020-01-02 PROCEDURE — C1713 ANCHOR/SCREW BN/BN,TIS/BN: HCPCS | Performed by: ORTHOPAEDIC SURGERY

## 2020-01-02 PROCEDURE — S0077 INJECTION, CLINDAMYCIN PHOSP: HCPCS | Performed by: NURSE ANESTHETIST, CERTIFIED REGISTERED

## 2020-01-02 PROCEDURE — 71000033 HC RECOVERY, INTIAL HOUR: Performed by: ORTHOPAEDIC SURGERY

## 2020-01-02 PROCEDURE — 25000003 PHARM REV CODE 250: Performed by: NURSE ANESTHETIST, CERTIFIED REGISTERED

## 2020-01-02 PROCEDURE — 37000008 HC ANESTHESIA 1ST 15 MINUTES: Performed by: ORTHOPAEDIC SURGERY

## 2020-01-02 PROCEDURE — C1769 GUIDE WIRE: HCPCS | Performed by: ORTHOPAEDIC SURGERY

## 2020-01-02 DEVICE — KWIRE 9 .045 1.1MM: Type: IMPLANTABLE DEVICE | Site: HAND | Status: FUNCTIONAL

## 2020-01-02 DEVICE — IMPLANTABLE DEVICE: Type: IMPLANTABLE DEVICE | Site: HAND | Status: FUNCTIONAL

## 2020-01-02 RX ORDER — OXYCODONE HYDROCHLORIDE 5 MG/1
5 TABLET ORAL
Status: DISCONTINUED | OUTPATIENT
Start: 2020-01-02 | End: 2020-01-02 | Stop reason: HOSPADM

## 2020-01-02 RX ORDER — GLYCOPYRROLATE 0.2 MG/ML
INJECTION INTRAMUSCULAR; INTRAVENOUS
Status: DISCONTINUED | OUTPATIENT
Start: 2020-01-02 | End: 2020-01-02

## 2020-01-02 RX ORDER — ONDANSETRON 2 MG/ML
4 INJECTION INTRAMUSCULAR; INTRAVENOUS EVERY 12 HOURS PRN
Status: CANCELLED | OUTPATIENT
Start: 2020-01-02

## 2020-01-02 RX ORDER — ONDANSETRON 2 MG/ML
4 INJECTION INTRAMUSCULAR; INTRAVENOUS DAILY PRN
Status: DISCONTINUED | OUTPATIENT
Start: 2020-01-02 | End: 2020-01-02 | Stop reason: HOSPADM

## 2020-01-02 RX ORDER — CLINDAMYCIN PHOSPHATE 900 MG/50ML
INJECTION, SOLUTION INTRAVENOUS
Status: DISCONTINUED | OUTPATIENT
Start: 2020-01-02 | End: 2020-01-02

## 2020-01-02 RX ORDER — HYDROCODONE BITARTRATE AND ACETAMINOPHEN 5; 325 MG/1; MG/1
1 TABLET ORAL EVERY 6 HOURS PRN
Qty: 15 TABLET | Refills: 0 | Status: ON HOLD | OUTPATIENT
Start: 2020-01-02 | End: 2020-02-05

## 2020-01-02 RX ORDER — DEXAMETHASONE SODIUM PHOSPHATE 4 MG/ML
INJECTION, SOLUTION INTRA-ARTICULAR; INTRALESIONAL; INTRAMUSCULAR; INTRAVENOUS; SOFT TISSUE
Status: DISCONTINUED | OUTPATIENT
Start: 2020-01-02 | End: 2020-01-02

## 2020-01-02 RX ORDER — LIDOCAINE HCL/PF 100 MG/5ML
SYRINGE (ML) INTRAVENOUS
Status: DISCONTINUED | OUTPATIENT
Start: 2020-01-02 | End: 2020-01-02

## 2020-01-02 RX ORDER — ROPIVACAINE HYDROCHLORIDE 5 MG/ML
INJECTION, SOLUTION EPIDURAL; INFILTRATION; PERINEURAL
Status: COMPLETED | OUTPATIENT
Start: 2020-01-02 | End: 2020-01-02

## 2020-01-02 RX ORDER — DIPHENHYDRAMINE HYDROCHLORIDE 50 MG/ML
25 INJECTION INTRAMUSCULAR; INTRAVENOUS EVERY 6 HOURS PRN
Status: DISCONTINUED | OUTPATIENT
Start: 2020-01-02 | End: 2020-01-02 | Stop reason: HOSPADM

## 2020-01-02 RX ORDER — HYDROMORPHONE HYDROCHLORIDE 2 MG/ML
0.4 INJECTION, SOLUTION INTRAMUSCULAR; INTRAVENOUS; SUBCUTANEOUS EVERY 5 MIN PRN
Status: DISCONTINUED | OUTPATIENT
Start: 2020-01-02 | End: 2020-01-02 | Stop reason: HOSPADM

## 2020-01-02 RX ORDER — ACETAMINOPHEN 500 MG
1000 TABLET ORAL
Status: COMPLETED | OUTPATIENT
Start: 2020-01-02 | End: 2020-01-02

## 2020-01-02 RX ORDER — SODIUM CHLORIDE, SODIUM LACTATE, POTASSIUM CHLORIDE, CALCIUM CHLORIDE 600; 310; 30; 20 MG/100ML; MG/100ML; MG/100ML; MG/100ML
INJECTION, SOLUTION INTRAVENOUS CONTINUOUS
Status: DISCONTINUED | OUTPATIENT
Start: 2020-01-02 | End: 2020-01-02 | Stop reason: HOSPADM

## 2020-01-02 RX ORDER — OXYCODONE HYDROCHLORIDE 5 MG/1
10 TABLET ORAL EVERY 4 HOURS PRN
Status: CANCELLED | OUTPATIENT
Start: 2020-01-02

## 2020-01-02 RX ORDER — FAMOTIDINE 20 MG/1
20 TABLET, FILM COATED ORAL
Status: COMPLETED | OUTPATIENT
Start: 2020-01-02 | End: 2020-01-02

## 2020-01-02 RX ORDER — ONDANSETRON 2 MG/ML
INJECTION INTRAMUSCULAR; INTRAVENOUS
Status: DISCONTINUED | OUTPATIENT
Start: 2020-01-02 | End: 2020-01-02

## 2020-01-02 RX ORDER — HYDROCODONE BITARTRATE AND ACETAMINOPHEN 5; 325 MG/1; MG/1
1 TABLET ORAL EVERY 4 HOURS PRN
Status: CANCELLED | OUTPATIENT
Start: 2020-01-02

## 2020-01-02 RX ORDER — PREGABALIN 75 MG/1
75 CAPSULE ORAL
Status: COMPLETED | OUTPATIENT
Start: 2020-01-02 | End: 2020-01-02

## 2020-01-02 RX ORDER — MIDAZOLAM HYDROCHLORIDE 1 MG/ML
5 INJECTION INTRAMUSCULAR; INTRAVENOUS ONCE AS NEEDED
Status: COMPLETED | OUTPATIENT
Start: 2020-01-02 | End: 2020-01-02

## 2020-01-02 RX ORDER — MEPERIDINE HYDROCHLORIDE 25 MG/ML
12.5 INJECTION INTRAMUSCULAR; INTRAVENOUS; SUBCUTANEOUS ONCE AS NEEDED
Status: DISCONTINUED | OUTPATIENT
Start: 2020-01-02 | End: 2020-01-02 | Stop reason: HOSPADM

## 2020-01-02 RX ORDER — SODIUM CHLORIDE 0.9 % (FLUSH) 0.9 %
10 SYRINGE (ML) INJECTION
Status: DISCONTINUED | OUTPATIENT
Start: 2020-01-02 | End: 2020-01-02 | Stop reason: HOSPADM

## 2020-01-02 RX ORDER — ONDANSETRON 4 MG/1
8 TABLET, ORALLY DISINTEGRATING ORAL EVERY 8 HOURS PRN
Qty: 10 TABLET | Refills: 1 | Status: ON HOLD | OUTPATIENT
Start: 2020-01-02 | End: 2020-02-05

## 2020-01-02 RX ORDER — FENTANYL CITRATE 50 UG/ML
100 INJECTION, SOLUTION INTRAMUSCULAR; INTRAVENOUS EVERY 5 MIN PRN
Status: DISCONTINUED | OUTPATIENT
Start: 2020-01-02 | End: 2020-01-02 | Stop reason: HOSPADM

## 2020-01-02 RX ORDER — PROPOFOL 10 MG/ML
VIAL (ML) INTRAVENOUS
Status: DISCONTINUED | OUTPATIENT
Start: 2020-01-02 | End: 2020-01-02

## 2020-01-02 RX ORDER — SODIUM CHLORIDE 0.9 % (FLUSH) 0.9 %
3 SYRINGE (ML) INJECTION
Status: DISCONTINUED | OUTPATIENT
Start: 2020-01-02 | End: 2020-01-02 | Stop reason: HOSPADM

## 2020-01-02 RX ADMIN — SODIUM CHLORIDE, SODIUM LACTATE, POTASSIUM CHLORIDE, AND CALCIUM CHLORIDE: 600; 310; 30; 20 INJECTION, SOLUTION INTRAVENOUS at 09:01

## 2020-01-02 RX ADMIN — FAMOTIDINE 20 MG: 20 TABLET, FILM COATED ORAL at 09:01

## 2020-01-02 RX ADMIN — ACETAMINOPHEN 1000 MG: 500 TABLET, FILM COATED ORAL at 09:01

## 2020-01-02 RX ADMIN — MIDAZOLAM HYDROCHLORIDE 5 MG: 1 INJECTION, SOLUTION INTRAMUSCULAR; INTRAVENOUS at 10:01

## 2020-01-02 RX ADMIN — ONDANSETRON 4 MG: 2 INJECTION INTRAMUSCULAR; INTRAVENOUS at 10:01

## 2020-01-02 RX ADMIN — LIDOCAINE HYDROCHLORIDE 50 MG: 20 INJECTION, SOLUTION INTRAVENOUS at 10:01

## 2020-01-02 RX ADMIN — PROPOFOL 200 MG: 10 INJECTION, EMULSION INTRAVENOUS at 10:01

## 2020-01-02 RX ADMIN — ROPIVACAINE HYDROCHLORIDE 30 ML: 5 INJECTION, SOLUTION EPIDURAL; INFILTRATION; PERINEURAL at 10:01

## 2020-01-02 RX ADMIN — PREGABALIN 75 MG: 75 CAPSULE ORAL at 09:01

## 2020-01-02 RX ADMIN — FENTANYL CITRATE 100 MCG: 50 INJECTION, SOLUTION INTRAMUSCULAR; INTRAVENOUS at 10:01

## 2020-01-02 RX ADMIN — CLINDAMYCIN PHOSPHATE 900 MG: 18 INJECTION, SOLUTION INTRAVENOUS at 10:01

## 2020-01-02 RX ADMIN — GLYCOPYRROLATE 0.2 MG: 0.2 INJECTION, SOLUTION INTRAMUSCULAR; INTRAVENOUS at 10:01

## 2020-01-02 RX ADMIN — CARBOXYMETHYLCELLULOSE SODIUM 2 DROP: 2.5 SOLUTION/ DROPS OPHTHALMIC at 10:01

## 2020-01-02 RX ADMIN — DEXAMETHASONE SODIUM PHOSPHATE 8 MG: 4 INJECTION, SOLUTION INTRAMUSCULAR; INTRAVENOUS at 10:01

## 2020-01-02 NOTE — ANESTHESIA POSTPROCEDURE EVALUATION
Anesthesia Post Evaluation    Patient: Fernie Rice    Procedure(s) Performed: Procedure(s) (LRB):  CLOSED REDUCTION, HAND, WITH PERCUTANEOUS PINNING (Right)    Final Anesthesia Type: general    Patient location during evaluation: PACU  Patient participation: Yes- Able to Participate  Level of consciousness: awake and alert  Post-procedure vital signs: reviewed and stable  Pain management: adequate  Airway patency: patent    PONV status at discharge: No PONV  Anesthetic complications: no      Cardiovascular status: blood pressure returned to baseline  Respiratory status: unassisted and room air  Hydration status: euvolemic  Follow-up not needed.          Vitals Value Taken Time   /74 1/2/2020 12:00 PM   Temp 36.6 °C (97.8 °F) 1/2/2020 11:45 AM   Pulse 74 1/2/2020 12:08 PM   Resp 16 1/2/2020 12:00 PM   SpO2 96 % 1/2/2020 12:08 PM   Vitals shown include unvalidated device data.      No case tracking events are documented in the log.      Pain/Mayur Score: Pain Rating Prior to Med Admin: 0 (1/2/2020  9:26 AM)  Pain Rating Post Med Admin: 0 (1/2/2020 11:43 AM)  Mayur Score: 9 (1/2/2020 11:43 AM)

## 2020-01-02 NOTE — TRANSFER OF CARE
"Anesthesia Transfer of Care Note    Patient: Fernie Rice    Procedure(s) Performed: Procedure(s) (LRB):  ORIF, FRACTURE, METACARPAL BONE  5TH METACARPAL (Right)    Patient location: PACU    Anesthesia Type: general    Transport from OR: Transported from OR on 2-3 L/min O2 by NC with adequate spontaneous ventilation    Post pain: adequate analgesia    Post assessment: no apparent anesthetic complications    Post vital signs: stable    Level of consciousness: awake and alert    Nausea/Vomiting: no nausea/vomiting    Complications: none    Transfer of care protocol was followed      Last vitals:   Visit Vitals  /78   Pulse 83   Temp 36.7 °C (98 °F)   Resp 18   Ht 5' 8" (1.727 m)   Wt 68 kg (150 lb)   SpO2 96%   BMI 22.81 kg/m²     "

## 2020-01-02 NOTE — OP NOTE
[facility]  Surgery Department  Operative Note    SUMMARY     Date of Procedure: 1/2/2020     Procedure:   1.  Closed reduction, percutaneous pinning displaced right 5th metacarpal fracture    Surgeon(s) and Role:     * Chace Jerez MD - Primary    Assistant:  Lucila Carrasco CST    Pre-Operative Diagnosis:   1.  Displaced right 5th metacarpal fracture    Post-Operative Diagnosis:   Same    Anesthesia:  General + regional    Technical Procedures Used: Mr. Rice was taken to the operating room on 01/02/2020 for planned fixation of the right displaced 5th metacarpal fracture. He was given 900 mg clindamycin given a penicillin allergy preoperatively.  He was also given a regional block.  He was taken to the operating room and placed in the supine position.  General tracheal anesthesia was administered. The right hand was prepped and draped in the usual sterile fashion.  Using the mini C-arm fluoroscopy as a hand table a closed reduction maneuver was utilized providing dorsally directed force through the flexed 5th MCP joint reducing the head and neck dorsally and then inferiorly directed force along the proximal portion.  Reduction was confirmed on C-arm fluoroscopy.  Next 2, 0.045 K-wires were placed in a retrograde, distal to proximal direction.  They crossed and bicortical bite was achieved. A provisional 3rd pin was placed but I was afraid th I incarcerated the extensor tendon so I removed it afterwards and the fracture reduction remained stable.  After confirmation of appropriate reduction as well as K-wire placement the K-wires were cut short.  Jerk and spirals were placed. A well-padded ulnar gutter splint was then placed. He was then extubated in the operating room and taken to the PACU without complication    Description of the Findings of the Procedure:  Closed reduction, percutaneous pinning    Significant Surgical Tasks Conducted by the Assistant(s), if Applicable:  Positioning,  prepping, draping, bandage    Complications: No    Estimated Blood Loss (EBL): 1cc         Implants:   Implant Name Type Inv. Item Serial No.  Lot No. LRB No. Used   KWIRE 9 .045 1.1MM - [id number]  KWIRE 9 .045 1.1MM  [employer]  Right 1   KWIRE 9 .045 1.1MM - [id number]  KWIRE 9 .045 1.1MM  [employer]  Right 1   KWIRE 9 .045 1.1MM - [id number]  KWIRE 9 .045 1.1MM  [employer]  Right 1   BALL PIN WIRE SIZE 1.1MM - [id number]  BALL PIN WIRE SIZE 1.1MM  [employer]  Right 1   BALL PIN WIRE SIZE 1.1MM - [id number]  BALL PIN WIRE SIZE 1.1MM  [employer]  Right 1   BALL PIN WIRE SIZE 1.1MM - [id number]  BALL PIN WIRE SIZE 1.1MM  [employer]  Right 1       Specimens:   Specimen (12h ago, onward)    None                  Condition: Good    Disposition: PACU - hemodynamically stable.    Attestation: I was present and scrubbed for the entire procedure.    Discharge Note    SUMMARY     Admit Date: 1/2/2020    Discharge Date and Time:  01/02/2020 11:08 AM    Hospital Course (synopsis of major diagnoses, care, treatment, and services provided during the course of the hospital stay): outpt     Final Diagnosis: Post-Op Diagnosis Codes:     * Closed displaced fracture of base of fifth metacarpal bone of right hand, initial encounter [S62.316A]    Disposition: Home or Self Care    Follow Up/Patient Instructions:     Medications:  Reconciled Home Medications:      Medication List      START taking these medications    HYDROcodone-acetaminophen 5-325 mg per tablet  Commonly known as:  NORCO  Take 1 tablet by mouth every 6 (six) hours as needed for Pain.        CHANGE how you take these medications    acyclovir 200 mg/5 mL suspension  Commonly known as:  ZOVIRAX  Take 20 mLs (800 mg total) by mouth 3 (three) times daily. for 7 days  What changed:    · when to take this  · reasons to take this     * ondansetron 4 MG Tbdl  Commonly known as:   ZOFRAN-ODT  Dissolve 1 tablet (4 mg total) by mouth every 8 (eight) hours as needed.  What changed:  Another medication with the same name was added. Make sure you understand how and when to take each.     * ondansetron 4 MG Tbdl  Commonly known as:  ZOFRAN-ODT  Take 2 tablets (8 mg total) by mouth every 8 (eight) hours as needed.  What changed:  You were already taking a medication with the same name, and this prescription was added. Make sure you understand how and when to take each.         * This list has 2 medication(s) that are the same as other medications prescribed for you. Read the directions carefully, and ask your doctor or other care provider to review them with you.            CONTINUE taking these medications    albuterol-ipratropium 2.5 mg-0.5 mg/3 mL nebulizer solution  Commonly known as:  DUO-NEB  Take 3 mLs by nebulization every 4 (four) hours as needed for Wheezing or Shortness of Breath. Rescue     ProAir HFA 90 mcg/actuation inhaler  Generic drug:  albuterol  Inhale 1-2 puffs into the lungs every 6 (six) hours as needed for Wheezing or Shortness of Breath. Rescue          Discharge Procedure Orders   Diet general     Call MD for:  temperature >100.4     Call MD for:  persistent nausea and vomiting     Call MD for:  severe uncontrolled pain     Call MD for:  difficulty breathing, headache or visual disturbances     Call MD for:  redness, tenderness, or signs of infection (pain, swelling, redness, odor or green/yellow discharge around incision site)     Call MD for:  hives     Call MD for:  persistent dizziness or light-headedness     Call MD for:  extreme fatigue     Keep surgical extremity elevated     Lifting restrictions     Leave dressing on - Keep it clean, dry, and intact until clinic visit     Follow-up Information     Chace House MD. Schedule an appointment as soon as possible for a visit in 14 days.    Specialty:  Orthopedic Surgery  Why:  For suture removal, For wound  re-check  Contact information:  [address] [facility] [address] [phone]

## 2020-01-02 NOTE — HISTORY AND PHYSICAL ADDENDUM
H&P  has been reviewed.  The patient has been examined, I concur with the findings and no changes have occurred since H&P was written.

## 2020-01-02 NOTE — DISCHARGE INSTRUCTIONS
Anesthesia: After Your Surgery  Youve just had surgery. During surgery, you received medication called anesthesia to keep you comfortable and pain-free. After surgery, you may experience some pain or nausea. This is common. Here are some tips for feeling better and recovering after surgery.    Going home  Your doctor or nurse will show you how to take care of yourself when you go home. He or she will also answer your questions. Have an adult family member or friend drive you home. For the first 24 hours after your surgery:    · Do not drive or use heavy equipment.  · Do not make important decisions or sign legal documents.  · Avoid alcohol.  · Have someone stay with you, if needed. He or she can watch for problems and help keep you safe.  · Take your time getting up from a seated or lying position. You may experience dizziness for 24 hours.    Be sure to keep all follow-up appointments with your doctor. And rest after your procedure for as long as your doctor tells you to.    Coping with pain  If you have pain after surgery, pain medication will help you feel better. Take it as directed, before pain becomes severe. Also, ask your doctor or pharmacist about other ways to control pain, such as with heat, ice, and relaxation. And follow any other instructions your surgeon or nurse gives you.    URINARY RETENTION  Should you experience a decrease in your urine output or are unable to urinate following surgery, this can be due to the medications given during surgery.  We recommend you going to the nearest Emergency Department.    Tips for taking pain medication  To get the best relief possible, remember these points:    · Pain medications can upset your stomach. Taking them with a little food may help.  · Most pain relievers taken by mouth need at least 20 to 30 minutes to take effect.  · Taking medication on a schedule can help you remember to take it. Try to time your medication so that you can take it before  beginning an activity, such as dressing, walking, or sitting down for dinner.  · Constipation is a common side effect of pain medications. Contact your doctor before taking any medications like laxatives or stool softeners to help relieve constipation. Also ask about any dietary restrictions, because drinking lots of fluids and eating foods like fruits and vegetables that are high in fiber can also help. Remember, dont take laxatives unless your surgeon has prescribed them.  · Mixing alcohol and pain medication can cause dizziness and slow your breathing. It can even be fatal. Dont drink alcohol while taking pain medication.  · Pain medication can slow your reflexes. Dont drive or operate machinery while taking pain medication.    If your health care provider tells you to take acetaminophen to help relieve your pain, ask him or her how much you are supposed to take each day. (Acetaminophen is the generic name for Tylenol and other brand-name pain relievers.) Acetaminophen or other pain relievers may interact with your prescription medicines or other over-the-counter (OTC) drugs. Some prescription medications contain acetaminophen along with other active ingredients. Using both prescription and OTC acetaminophen for pain can cause you to overdose. The FDA recommends that you read the labels on your OTC medications carefully. This will help you to clearly understand the list of active ingredients, dosing instructions, and any warnings. It may also help you avoid taking too much acetaminophen. If you have questions or don't understand the information, ask your pharmacist or health care provider to explain it to you before you take the OTC medication.    Managing nausea  Some people have an upset stomach after surgery. This is often due to anesthesia, pain, pain medications, or the stress of surgery. The following tips will help you manage nausea and get good nutrition as you recover. If you were on a special diet  before surgery, ask your doctor if you should follow it during recovery. These tips may help:    · Dont push yourself to eat. Your body will tell you when to eat and how much.  · Start off with clear liquids and soup. They are easier to digest.  · Progress to semi-solid foods (mashed potatoes, applesauce, and gelatin) as you feel ready.  · Slowly move to solid foods. Dont eat fatty, rich, or spicy foods at first.  · Dont force yourself to have three large meals a day. Instead, eat smaller amounts more often.  · Take pain medications with a small amount of solid food, such as crackers or toast to avoid nausea.      Call your surgeon if    · You feel too sleepy, dizzy, or groggy (medication may be too strong).  · You have side effects like nausea, vomiting, or skin changes (rash, itching, or hives).     © 3915-1049 [employer]. [address] [place] [address], [place] [address]. All rights reserved. This information is not intended as a substitute for professional medical care. Always follow your healthcare professional's instructions.    PLEASE FOLLOW ANY OTHER INSTRUCTIONS PROVIDED TO YOU BY DR. PASTOR!

## 2020-01-02 NOTE — ANESTHESIA PROCEDURE NOTES
Right IFB    Patient location during procedure: holding area   Block not for primary anesthetic.  Reason for block: at surgeon's request and post-op pain management   Post-op Pain Location: Right hand  Timeout: 1/2/2020 10:00 AM   End time: 1/2/2020 10:12 AM    Staffing  Authorizing Provider: Jim Breaux MD  Performing Provider: Jim Breaux MD    Preanesthetic Checklist  Completed: patient identified, site marked, surgical consent, pre-op evaluation, timeout performed, IV checked, risks and benefits discussed and monitors and equipment checked  Peripheral Block  Patient position: supine  Prep: ChloraPrep and site prepped and draped  Patient monitoring: heart rate, cardiac monitor, continuous pulse ox and frequent blood pressure checks  Block type: infraclavicular  Laterality: right  Injection technique: single shot  Needle  Needle type: Echogenic   Needle gauge: 21 G  Needle length: 4 in  Needle localization: anatomical landmarks and ultrasound guidance   -ultrasound image captured on disc.  Assessment  Injection assessment: negative aspiration, negative parasthesia and local visualized surrounding nerve  Paresthesia pain: none  Heart rate change: no  Slow fractionated injection: yes

## 2020-01-02 NOTE — PLAN OF CARE
Fernie Britton Rice has met all discharge criteria from Phase II. Vital Signs are stable, ambulating  without difficulty. Discharge instructions given, patient verbalized understanding. Discharged from facility via wheelchair in stable condition.
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
## 2020-01-02 NOTE — OR NURSING
Very sleepy upon  Arrival to pacu.  Opens eyes and follows commands, with hesitation.  Unable yo move fingers rt hand and denies c/o sensation to rt hand.  Falls asleep immediately

## 2020-01-22 ENCOUNTER — OFFICE VISIT (OUTPATIENT)
Dept: URGENT CARE | Facility: CLINIC | Age: 20
End: 2020-01-22
Payer: COMMERCIAL

## 2020-01-22 VITALS
TEMPERATURE: 97 F | HEART RATE: 97 BPM | WEIGHT: 150 LBS | DIASTOLIC BLOOD PRESSURE: 84 MMHG | OXYGEN SATURATION: 97 % | SYSTOLIC BLOOD PRESSURE: 130 MMHG | BODY MASS INDEX: 22.73 KG/M2 | HEIGHT: 68 IN

## 2020-01-22 DIAGNOSIS — N34.2 URETHRITIS: ICD-10-CM

## 2020-01-22 DIAGNOSIS — R30.0 BURNING WITH URINATION: Primary | ICD-10-CM

## 2020-01-22 PROCEDURE — 99214 OFFICE O/P EST MOD 30 MIN: CPT | Mod: S$GLB,,, | Performed by: INTERNAL MEDICINE

## 2020-01-22 PROCEDURE — 87491 CHLMYD TRACH DNA AMP PROBE: CPT

## 2020-01-22 PROCEDURE — 99214 PR OFFICE/OUTPT VISIT, EST, LEVL IV, 30-39 MIN: ICD-10-PCS | Mod: S$GLB,,, | Performed by: INTERNAL MEDICINE

## 2020-01-22 PROCEDURE — 87661 TRICHOMONAS VAGINALIS AMPLIF: CPT

## 2020-01-22 RX ORDER — DOXYCYCLINE 100 MG/1
100 CAPSULE ORAL 2 TIMES DAILY
Qty: 14 CAPSULE | Refills: 0 | Status: SHIPPED | OUTPATIENT
Start: 2020-01-22 | End: 2020-01-29

## 2020-01-22 NOTE — PROGRESS NOTES
"Subjective:       Patient ID: Fernie Rice is a 19 y.o. male.    Vitals:  height is 5' 8" (1.727 m) and weight is 68 kg (150 lb). His oral temperature is 97 °F (36.1 °C). His blood pressure is 130/84 and his pulse is 97. His oxygen saturation is 97%.     Chief Complaint: Dysuria    Dysuria    This is a new problem. The current episode started yesterday. The problem occurs every urination. The problem has been unchanged. The quality of the pain is described as burning. The pain is at a severity of 8/10. The pain is moderate. There has been no fever. He is sexually active. There is no history of pyelonephritis. Pertinent negatives include no behavior changes, chills, discharge, flank pain, frequency, hematuria, hesitancy, nausea, possible pregnancy, sweats, urgency, vomiting, weight loss, bubble bath use, constipation, rash or withholding. He has tried nothing for the symptoms. There is no history of catheterization, diabetes insipidus, diabetes mellitus, genitourinary reflux, hypertension, kidney stones, recurrent UTIs, a single kidney, STD, urinary stasis or a urological procedure.       Constitution: Negative for chills and fever.   Neck: Negative for painful lymph nodes.   Gastrointestinal: Negative for abdominal pain, nausea, vomiting and constipation.   Genitourinary: Negative for dysuria, frequency, urgency, urine decreased, flank pain, hematuria, history of kidney stones, genital trauma, painful intercourse, genital sore, penile discharge, painful ejaculation, penile pain, penile swelling, scrotal swelling and testicular pain.   Musculoskeletal: Negative for back pain.   Skin: Negative for rash and lesion.   Hematologic/Lymphatic: Negative for swollen lymph nodes.       Objective:      Physical Exam   Constitutional: He appears well-developed and well-nourished.   HENT:   Head: Normocephalic and atraumatic.   Eyes: Pupils are equal, round, and reactive to light. Conjunctivae and EOM are normal. "   Genitourinary:   Genitourinary Comments: Nl    Nursing note and vitals reviewed.        Assessment:       1. Burning with urination    2. Urethritis        Plan:         Burning with urination  -     POCT Urinalysis, Dipstick, Automated, W/O Scope    Urethritis  -     C. trachomatis/N. gonorrhoeae by AMP DNA  -     Trichomonas vaginalis, RNA, Qual, Urine  -     gemifloxacin (FACTIVE) 320 mg tablet; Take one po now  Dispense: 1 tablet; Refill: 0    Other orders  -     doxycycline (VIBRAMYCIN) 100 MG Cap; Take 1 capsule (100 mg total) by mouth 2 (two) times daily. for 7 days  Dispense: 14 capsule; Refill: 0

## 2020-01-23 LAB
C TRACH DNA SPEC QL NAA+PROBE: NOT DETECTED
N GONORRHOEA DNA SPEC QL NAA+PROBE: NOT DETECTED

## 2020-01-27 ENCOUNTER — TELEPHONE (OUTPATIENT)
Dept: URGENT CARE | Facility: CLINIC | Age: 20
End: 2020-01-27

## 2020-01-27 LAB
T VAGINALIS RRNA SPEC QL NAA+PROBE: NOT DETECTED
TRICHOMONAS VAGINALIS RNA, QUAL, SOURCE: NORMAL

## 2020-01-27 NOTE — TELEPHONE ENCOUNTER
Attempted to contact patient regarding negative results. Left message for patient to return call to clinic

## 2020-02-02 ENCOUNTER — HOSPITAL ENCOUNTER (INPATIENT)
Facility: HOSPITAL | Age: 20
LOS: 5 days | Discharge: HOME | DRG: 872 | End: 2020-02-07
Attending: EMERGENCY MEDICINE | Admitting: EMERGENCY MEDICINE
Payer: COMMERCIAL

## 2020-02-02 DIAGNOSIS — B00.0 ECZEMA HERPETICUM: ICD-10-CM

## 2020-02-02 DIAGNOSIS — R07.9 CHEST PAIN: ICD-10-CM

## 2020-02-02 DIAGNOSIS — N12 PYELONEPHRITIS: Primary | ICD-10-CM

## 2020-02-02 DIAGNOSIS — Z93.6 NEPHROSTOMY STATUS: ICD-10-CM

## 2020-02-02 DIAGNOSIS — I49.9 IRREGULAR HEART RHYTHM: ICD-10-CM

## 2020-02-02 DIAGNOSIS — R10.9 RIGHT-SIDED ABDOMINAL PAIN OF UNKNOWN CAUSE: ICD-10-CM

## 2020-02-02 DIAGNOSIS — Q62.5: ICD-10-CM

## 2020-02-02 DIAGNOSIS — R50.9 FEVER: ICD-10-CM

## 2020-02-02 DIAGNOSIS — Q62.5 DUPLICATED URETER, RIGHT: ICD-10-CM

## 2020-02-02 DIAGNOSIS — A41.9 SEPSIS: ICD-10-CM

## 2020-02-02 DIAGNOSIS — N13.8: ICD-10-CM

## 2020-02-02 DIAGNOSIS — L01.00 IMPETIGO: ICD-10-CM

## 2020-02-02 LAB
ALBUMIN SERPL BCP-MCNC: 3.5 G/DL (ref 3.5–5.2)
ALP SERPL-CCNC: 103 U/L (ref 55–135)
ALT SERPL W/O P-5'-P-CCNC: 21 U/L (ref 10–44)
ANION GAP SERPL CALC-SCNC: 13 MMOL/L (ref 8–16)
APTT BLDCRRT: 22.1 SEC (ref 21–32)
AST SERPL-CCNC: 14 U/L (ref 10–40)
BACTERIA #/AREA URNS AUTO: ABNORMAL /HPF
BASOPHILS # BLD AUTO: 0.05 K/UL (ref 0–0.2)
BASOPHILS NFR BLD: 0.3 % (ref 0–1.9)
BILIRUB SERPL-MCNC: 1.6 MG/DL (ref 0.1–1)
BILIRUB UR QL STRIP: NEGATIVE
BUN SERPL-MCNC: 12 MG/DL (ref 6–20)
CALCIUM SERPL-MCNC: 9.9 MG/DL (ref 8.7–10.5)
CHLORIDE SERPL-SCNC: 99 MMOL/L (ref 95–110)
CLARITY UR REFRACT.AUTO: ABNORMAL
CO2 SERPL-SCNC: 25 MMOL/L (ref 23–29)
COLOR UR AUTO: ABNORMAL
CREAT SERPL-MCNC: 1 MG/DL (ref 0.5–1.4)
DIFFERENTIAL METHOD: ABNORMAL
EOSINOPHIL # BLD AUTO: 0.6 K/UL (ref 0–0.5)
EOSINOPHIL NFR BLD: 3.8 % (ref 0–8)
ERYTHROCYTE [DISTWIDTH] IN BLOOD BY AUTOMATED COUNT: 13.5 % (ref 11.5–14.5)
EST. GFR  (AFRICAN AMERICAN): >60 ML/MIN/1.73 M^2
EST. GFR  (NON AFRICAN AMERICAN): >60 ML/MIN/1.73 M^2
GLUCOSE SERPL-MCNC: 129 MG/DL (ref 70–110)
GLUCOSE UR QL STRIP: NEGATIVE
GRAM STN SPEC: NORMAL
GRAM STN SPEC: NORMAL
HCT VFR BLD AUTO: 43.1 % (ref 40–54)
HGB BLD-MCNC: 14.3 G/DL (ref 14–18)
HGB UR QL STRIP: ABNORMAL
HYALINE CASTS UR QL AUTO: 16 /LPF
IMM GRANULOCYTES # BLD AUTO: 0.08 K/UL (ref 0–0.04)
IMM GRANULOCYTES NFR BLD AUTO: 0.5 % (ref 0–0.5)
INFLUENZA A, MOLECULAR: NEGATIVE
INFLUENZA B, MOLECULAR: NEGATIVE
INR PPP: 1 (ref 0.8–1.2)
KETONES UR QL STRIP: NEGATIVE
LACTATE SERPL-SCNC: 0.9 MMOL/L (ref 0.5–2.2)
LACTATE SERPL-SCNC: 2.7 MMOL/L (ref 0.5–2.2)
LEUKOCYTE ESTERASE UR QL STRIP: ABNORMAL
LIPASE SERPL-CCNC: 7 U/L (ref 4–60)
LYMPHOCYTES # BLD AUTO: 1.1 K/UL (ref 1–4.8)
LYMPHOCYTES NFR BLD: 7.4 % (ref 18–48)
MCH RBC QN AUTO: 29.1 PG (ref 27–31)
MCHC RBC AUTO-ENTMCNC: 33.2 G/DL (ref 32–36)
MCV RBC AUTO: 88 FL (ref 82–98)
MICROSCOPIC COMMENT: ABNORMAL
MONOCYTES # BLD AUTO: 0.3 K/UL (ref 0.3–1)
MONOCYTES NFR BLD: 1.7 % (ref 4–15)
NEUTROPHILS # BLD AUTO: 12.5 K/UL (ref 1.8–7.7)
NEUTROPHILS NFR BLD: 86.3 % (ref 38–73)
NITRITE UR QL STRIP: POSITIVE
NRBC BLD-RTO: 0 /100 WBC
PH UR STRIP: 6 [PH] (ref 5–8)
PLATELET # BLD AUTO: 409 K/UL (ref 150–350)
PMV BLD AUTO: 8.8 FL (ref 9.2–12.9)
POTASSIUM SERPL-SCNC: 4 MMOL/L (ref 3.5–5.1)
PROT SERPL-MCNC: 8.3 G/DL (ref 6–8.4)
PROT UR QL STRIP: ABNORMAL
PROTHROMBIN TIME: 10.8 SEC (ref 9–12.5)
RBC # BLD AUTO: 4.91 M/UL (ref 4.6–6.2)
RBC #/AREA URNS AUTO: 5 /HPF (ref 0–4)
SODIUM SERPL-SCNC: 137 MMOL/L (ref 136–145)
SP GR UR STRIP: 1.01 (ref 1–1.03)
SPECIMEN SOURCE: NORMAL
TROPONIN I SERPL DL<=0.01 NG/ML-MCNC: <0.006 NG/ML (ref 0–0.03)
URN SPEC COLLECT METH UR: ABNORMAL
WBC # BLD AUTO: 14.55 K/UL (ref 3.9–12.7)
WBC #/AREA URNS AUTO: >100 /HPF (ref 0–5)

## 2020-02-02 PROCEDURE — 87502 INFLUENZA DNA AMP PROBE: CPT

## 2020-02-02 PROCEDURE — 87086 URINE CULTURE/COLONY COUNT: CPT

## 2020-02-02 PROCEDURE — 25000003 PHARM REV CODE 250: Performed by: EMERGENCY MEDICINE

## 2020-02-02 PROCEDURE — 63600175 PHARM REV CODE 636 W HCPCS: Performed by: EMERGENCY MEDICINE

## 2020-02-02 PROCEDURE — 99291 PR CRITICAL CARE, E/M 30-74 MINUTES: ICD-10-PCS | Mod: ,,, | Performed by: EMERGENCY MEDICINE

## 2020-02-02 PROCEDURE — 87070 CULTURE OTHR SPECIMN AEROBIC: CPT

## 2020-02-02 PROCEDURE — 99291 CRITICAL CARE FIRST HOUR: CPT | Mod: ,,, | Performed by: EMERGENCY MEDICINE

## 2020-02-02 PROCEDURE — 87102 FUNGUS ISOLATION CULTURE: CPT

## 2020-02-02 PROCEDURE — 87205 SMEAR GRAM STAIN: CPT

## 2020-02-02 PROCEDURE — 93010 ELECTROCARDIOGRAM REPORT: CPT | Mod: ,,, | Performed by: INTERNAL MEDICINE

## 2020-02-02 PROCEDURE — 99285 EMERGENCY DEPT VISIT HI MDM: CPT | Mod: 25

## 2020-02-02 PROCEDURE — 81001 URINALYSIS AUTO W/SCOPE: CPT

## 2020-02-02 PROCEDURE — 87040 BLOOD CULTURE FOR BACTERIA: CPT

## 2020-02-02 PROCEDURE — 87075 CULTR BACTERIA EXCEPT BLOOD: CPT

## 2020-02-02 PROCEDURE — 96366 THER/PROPH/DIAG IV INF ADDON: CPT

## 2020-02-02 PROCEDURE — 83690 ASSAY OF LIPASE: CPT

## 2020-02-02 PROCEDURE — 84484 ASSAY OF TROPONIN QUANT: CPT

## 2020-02-02 PROCEDURE — 99222 PR INITIAL HOSPITAL CARE,LEVL II: ICD-10-PCS | Mod: ,,, | Performed by: UROLOGY

## 2020-02-02 PROCEDURE — 85025 COMPLETE CBC W/AUTO DIFF WBC: CPT

## 2020-02-02 PROCEDURE — 85610 PROTHROMBIN TIME: CPT

## 2020-02-02 PROCEDURE — 99222 1ST HOSP IP/OBS MODERATE 55: CPT | Mod: ,,, | Performed by: UROLOGY

## 2020-02-02 PROCEDURE — 63600175 PHARM REV CODE 636 W HCPCS: Performed by: STUDENT IN AN ORGANIZED HEALTH CARE EDUCATION/TRAINING PROGRAM

## 2020-02-02 PROCEDURE — 93010 EKG 12-LEAD: ICD-10-PCS | Mod: ,,, | Performed by: INTERNAL MEDICINE

## 2020-02-02 PROCEDURE — 12000002 HC ACUTE/MED SURGE SEMI-PRIVATE ROOM

## 2020-02-02 PROCEDURE — 25000003 PHARM REV CODE 250: Performed by: RADIOLOGY

## 2020-02-02 PROCEDURE — 96375 TX/PRO/DX INJ NEW DRUG ADDON: CPT

## 2020-02-02 PROCEDURE — 25500020 PHARM REV CODE 255: Performed by: EMERGENCY MEDICINE

## 2020-02-02 PROCEDURE — 83605 ASSAY OF LACTIC ACID: CPT

## 2020-02-02 PROCEDURE — 87491 CHLMYD TRACH DNA AMP PROBE: CPT

## 2020-02-02 PROCEDURE — 63600175 PHARM REV CODE 636 W HCPCS: Performed by: RADIOLOGY

## 2020-02-02 PROCEDURE — 80053 COMPREHEN METABOLIC PANEL: CPT

## 2020-02-02 PROCEDURE — 87205 SMEAR GRAM STAIN: CPT | Mod: 59

## 2020-02-02 PROCEDURE — 85730 THROMBOPLASTIN TIME PARTIAL: CPT

## 2020-02-02 PROCEDURE — 96361 HYDRATE IV INFUSION ADD-ON: CPT

## 2020-02-02 PROCEDURE — 93005 ELECTROCARDIOGRAM TRACING: CPT

## 2020-02-02 PROCEDURE — 11000001 HC ACUTE MED/SURG PRIVATE ROOM

## 2020-02-02 PROCEDURE — 96365 THER/PROPH/DIAG IV INF INIT: CPT

## 2020-02-02 RX ORDER — KETOROLAC TROMETHAMINE 30 MG/ML
10 INJECTION, SOLUTION INTRAMUSCULAR; INTRAVENOUS
Status: COMPLETED | OUTPATIENT
Start: 2020-02-02 | End: 2020-02-02

## 2020-02-02 RX ORDER — FENTANYL CITRATE 50 UG/ML
INJECTION, SOLUTION INTRAMUSCULAR; INTRAVENOUS CODE/TRAUMA/SEDATION MEDICATION
Status: COMPLETED | OUTPATIENT
Start: 2020-02-02 | End: 2020-02-02

## 2020-02-02 RX ORDER — MORPHINE SULFATE 2 MG/ML
4 INJECTION, SOLUTION INTRAMUSCULAR; INTRAVENOUS ONCE
Status: COMPLETED | OUTPATIENT
Start: 2020-02-02 | End: 2020-02-02

## 2020-02-02 RX ORDER — MIDAZOLAM HYDROCHLORIDE 1 MG/ML
INJECTION INTRAMUSCULAR; INTRAVENOUS CODE/TRAUMA/SEDATION MEDICATION
Status: COMPLETED | OUTPATIENT
Start: 2020-02-02 | End: 2020-02-02

## 2020-02-02 RX ORDER — LIDOCAINE HYDROCHLORIDE 10 MG/ML
INJECTION INFILTRATION; PERINEURAL CODE/TRAUMA/SEDATION MEDICATION
Status: COMPLETED | OUTPATIENT
Start: 2020-02-02 | End: 2020-02-02

## 2020-02-02 RX ORDER — CEFTRIAXONE 1 G/1
1 INJECTION, POWDER, FOR SOLUTION INTRAMUSCULAR; INTRAVENOUS
Status: DISCONTINUED | OUTPATIENT
Start: 2020-02-03 | End: 2020-02-05

## 2020-02-02 RX ORDER — ACETAMINOPHEN 500 MG
1000 TABLET ORAL
Status: COMPLETED | OUTPATIENT
Start: 2020-02-02 | End: 2020-02-02

## 2020-02-02 RX ORDER — ACETAMINOPHEN 325 MG/1
650 TABLET ORAL
Status: COMPLETED | OUTPATIENT
Start: 2020-02-02 | End: 2020-02-02

## 2020-02-02 RX ORDER — SODIUM CHLORIDE 0.9 % (FLUSH) 0.9 %
10 SYRINGE (ML) INJECTION
Status: CANCELLED | OUTPATIENT
Start: 2020-02-02

## 2020-02-02 RX ORDER — MORPHINE SULFATE 2 MG/ML
2 INJECTION, SOLUTION INTRAMUSCULAR; INTRAVENOUS
Status: COMPLETED | OUTPATIENT
Start: 2020-02-02 | End: 2020-02-02

## 2020-02-02 RX ORDER — MORPHINE SULFATE 2 MG/ML
INJECTION, SOLUTION INTRAMUSCULAR; INTRAVENOUS CODE/TRAUMA/SEDATION MEDICATION
Status: COMPLETED | OUTPATIENT
Start: 2020-02-02 | End: 2020-02-02

## 2020-02-02 RX ORDER — DIPHENHYDRAMINE HYDROCHLORIDE 50 MG/ML
INJECTION INTRAMUSCULAR; INTRAVENOUS CODE/TRAUMA/SEDATION MEDICATION
Status: COMPLETED | OUTPATIENT
Start: 2020-02-02 | End: 2020-02-02

## 2020-02-02 RX ADMIN — DIPHENHYDRAMINE HYDROCHLORIDE 50 MG: 50 INJECTION, SOLUTION INTRAMUSCULAR; INTRAVENOUS at 10:02

## 2020-02-02 RX ADMIN — MORPHINE SULFATE 2 MG: 2 INJECTION, SOLUTION INTRAMUSCULAR; INTRAVENOUS at 08:02

## 2020-02-02 RX ADMIN — ACETAMINOPHEN 650 MG: 325 TABLET ORAL at 10:02

## 2020-02-02 RX ADMIN — LIDOCAINE HYDROCHLORIDE 10 ML: 10 INJECTION, SOLUTION INFILTRATION; PERINEURAL at 10:02

## 2020-02-02 RX ADMIN — SODIUM CHLORIDE 1000 ML: 0.9 INJECTION, SOLUTION INTRAVENOUS at 10:02

## 2020-02-02 RX ADMIN — MORPHINE SULFATE 2 MG: 2 INJECTION, SOLUTION INTRAMUSCULAR; INTRAVENOUS at 10:02

## 2020-02-02 RX ADMIN — ACETAMINOPHEN 1000 MG: 500 TABLET ORAL at 02:02

## 2020-02-02 RX ADMIN — MIDAZOLAM HYDROCHLORIDE 1 MG: 1 INJECTION, SOLUTION INTRAMUSCULAR; INTRAVENOUS at 10:02

## 2020-02-02 RX ADMIN — IOHEXOL 30 ML: 300 INJECTION, SOLUTION INTRAVENOUS at 11:02

## 2020-02-02 RX ADMIN — FENTANYL CITRATE 50 MCG: 50 INJECTION, SOLUTION INTRAMUSCULAR; INTRAVENOUS at 10:02

## 2020-02-02 RX ADMIN — IOHEXOL 75 ML: 350 INJECTION, SOLUTION INTRAVENOUS at 05:02

## 2020-02-02 RX ADMIN — CEFTRIAXONE 2 G: 2 INJECTION, SOLUTION INTRAVENOUS at 04:02

## 2020-02-02 RX ADMIN — MORPHINE SULFATE 4 MG: 2 INJECTION, SOLUTION INTRAMUSCULAR; INTRAVENOUS at 11:02

## 2020-02-02 RX ADMIN — SODIUM CHLORIDE 2178 ML: 0.9 INJECTION, SOLUTION INTRAVENOUS at 02:02

## 2020-02-02 RX ADMIN — KETOROLAC TROMETHAMINE 10 MG: 30 INJECTION, SOLUTION INTRAMUSCULAR; INTRAVENOUS at 08:02

## 2020-02-02 NOTE — ED PROVIDER NOTES
CC: Emesis (with abdominal pain, )      History provided by:   Patient     HPI: Ferine Rice is a 19 y.o. year old male past medical history of ADHD, esophagitis, asthma who presents to the ED complaining of right-sided abdominal and chest pain intermittent for the last few weeks that he describes is a combination of sharp and dull with chills that started last night the rhinorrhea. Denies cough or shortness of breath or chest pain  Denies any dysuria frequency of urination or hematuria  No diarrhea  Had a few episodes of vomiting negative for coffee-grounds  No blood in the stool no melena    No recent travel no sick contacts denies any IVDA, positive for THC        Past Medical History:   Diagnosis Date    ADHD (attention deficit hyperactivity disorder)     Asthma     Eczema     Esophagitis     Food impaction of esophagus     Multiple food allergies     Vernal keratoconjunctivitis      Past Surgical History:   Procedure Laterality Date    ADENOIDECTOMY      CLOSED REDUCTION OF INJURY OF HAND WITH PERCUTANEOUS PINNING Right 1/2/2020    Procedure: CLOSED REDUCTION, HAND, WITH PERCUTANEOUS PINNING;  Surgeon: Chace Jerez MD;  Location[facility];  Service: Orthopedics;  Laterality: Right;    EYE SURGERY      ulcers removed    SINUS SURGERY       No family history on file.  No current facility-administered medications on file prior to encounter.      Current Outpatient Medications on File Prior to Encounter   Medication Sig Dispense Refill    HYDROcodone-acetaminophen (NORCO) 5-325 mg per tablet Take 1 tablet by mouth every 6 (six) hours as needed for Pain. 15 tablet 0    acyclovir (ZOVIRAX) 200 mg/5 mL suspension Take 20 mLs (800 mg total) by mouth 3 (three) times daily. for 7 days (Patient taking differently: Take 800 mg by mouth 3 (three) times daily as needed. ) 420 mL 0    albuterol-ipratropium (DUO-NEB) 2.5 mg-0.5 mg/3 mL nebulizer solution Take 3 mLs by nebulization every 4 (four) hours as  needed for Wheezing or Shortness of Breath. Rescue 180 mL 0    gemifloxacin (FACTIVE) 320 mg tablet Take one po now 1 tablet 0    ondansetron (ZOFRAN-ODT) 4 MG TbDL Dissolve 1 tablet (4 mg total) by mouth every 8 (eight) hours as needed. (Patient not taking: Reported on 11/4/2019) 21 tablet 0    ondansetron (ZOFRAN-ODT) 4 MG TbDL Take 2 tablets (8 mg total) by mouth every 8 (eight) hours as needed. 10 tablet 1    PROAIR HFA 90 mcg/actuation inhaler Inhale 1-2 puffs into the lungs every 6 (six) hours as needed for Wheezing or Shortness of Breath. Rescue 18 g 0     Fish containing products; Peanut; Amoxicillin; Bactrim [sulfamethoxazole-trimethoprim]; Corn containing products; Soy fiber; and Soy flour  Social History     Socioeconomic History    Marital status: Single     Spouse name: Not on file    Number of children: Not on file    Years of education: Not on file    Highest education level: Not on file   Occupational History    Not on file   Social Needs    Financial resource strain: Not on file    Food insecurity:     Worry: Not on file     Inability: Not on file    Transportation needs:     Medical: Not on file     Non-medical: Not on file   Tobacco Use    Smoking status: Current Some Day Smoker    Smokeless tobacco: Never Used   Substance and Sexual Activity    Alcohol use: Yes     Alcohol/week: 20.0 standard drinks     Types: 20 Cans of beer per week    Drug use: Yes     Types: Marijuana    Sexual activity: Yes     Partners: Female     Birth control/protection: Condom     Comment: quit 2 weeks ago.   Lifestyle    Physical activity:     Days per week: Not on file     Minutes per session: Not on file    Stress: Not on file   Relationships    Social connections:     Talks on phone: Not on file     Gets together: Not on file     Attends Hinduism service: Not on file     Active member of club or organization: Not on file     Attends meetings of clubs or organizations: Not on file     Relationship  status: Not on file   Other Topics Concern    Not on file   Social History Narrative    Not on file       ROS:     Constitutional : neg for fever, neg for weakness, chills and fever  HENT neg for head injury, neg for sore throat, rhinorrhea  Eyes: neg for visual changes, neg for eye pain  Resp neg for SOB, neg for cough  Cardiac  pos for chest pain, neg for palpitations  GI positive for right side abdominal pain and chest, neg for nausea, neg for vomiting   neg for urinary changes  Neuro neg for focal weakness or numbness  Skin neg for skin rash  MSK: neg for joint pain, neg for joint swelling  ALL: Fish containing products; Peanut; Amoxicillin; Bactrim [sulfamethoxazole-trimethoprim]; Corn containing products; Soy fiber; and Soy flour    PHYSICAL EXAM:  Vitals:    02/02/20 1414   BP: 107/62   Pulse: (!) 182   Resp: 18   Temp: (!) 103 °F (39.4 °C)         PHYSICAL EXAM:     general: comfortable, in no acute distress, pleasant, well nourished  VS: triage VS reviewed  HENT: NC/AT  Eyes: PERRL, EOMI  CV:  Tachycardic regular, no  murmurs, no rubs, no gallops, no LE edema  Resp: comfortable breathing, speaks in full sentences, CTAB, no wheezing, no crackles, no ronchi  ABD:  soft, ND, + normal BS, NT  Renal: No CVAT  Neuro: AAO x 3, 5/5 muscle strength in upper and lower extremities, sensation grossly intact, face symmetric, speech normal  MSK: no deformity, no edema            DATA & INTERVENTIONS:    LABS reviewed:  Labs Reviewed - No data to display    RADIOLOGY reviewed:  Imaging Results    None         MEDICATIONS/FLUIDS:  Medications - No data to display      MDM:  Fernie Rice is a 19 y.o. year old male who presents to the ED complaining of right-sided chest and abdominal pain for the last few weeks, with chills and rhinorrhea that started last night and vomiting this morning  He denies any symptoms of colitis, not tender over the abdominal exam negative for CVA tenderness  Negative for cough to suggest  pneumonia.       DDX includes but not limited to: pneumonia vs pyelo vs ki stone vs intraabd infection    Febrile and tachycardic will start sepsis workup with blood cultures UA chest x-ray abdominal x-ray influenza, IV fluids antipyretics.  Will consider CT abdomen and pelvis if no initial findings.  Also possible PE however the fever is quite high at 103 with no shortness of breath and respiratory rate is normal    Labs ordered and reviewed:   Influenza negative  CBC with leukocytosis of 14.5, hemoglobin normal, platelets 409  CMP with normal kidney function, normal LFTs, elevated total bili at 1.6  Lactate 2.7  Lipase normal  Troponin within normal limits  Medication given in the ED:  Ceftriaxone, Tylenol, Toradol, morphine, IV fluids    CXR (ordered and reviewed):  Chest x-ray no infiltrate no pleural effusion  Imagings independently visualized: yes    EKG (independantly reviewed):  Heart rate 165, sinus tachycardia ST depressions in the inferior and anterolateral leads possibly secondary to tachycardia    Old records obtained and reviewed:    January 22, 2020 Trichomonas negative  GC Chlamydia negative  Office visit January 22, 2020 for dysuria    Case discussed with the consultant:  Urology      CT abdomen pelvisThere is a 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  This could represent inflammation of the ureter due to infection or potentially issues with chronic urine stasis.  Ureteroceles at the insertion of the right ureters.  Multiple nonobstructive renal calculi and also nonspecific parenchymal calcifications within the right mid to upper pole.    IR consulted by Urology and patient went from the emergency department to IR for nephrostomy tubes.  Patient admitted to Hospital Medicine  IMPRESSION:  1.)  Urosepsis  2.)  Right side ureteral dilation, duplicate system    Dispo:  admit    Aggregate Critical Care Time by Attending (exclusive of procedural time) = 45 minutes         During the Emergency Depment visit, there was a high probability of imminent or life threatening deterioration in the patient's condition necessitating medical decision  making of a high complexity. Organ systems that were compromised/potentially compromised                    Renal                   cardiovascular  and/or there was potential for sepsis or metabolic failure.     Pt required constant monitoring because of the potential for them to deteriorate at any moment. Critical care was time spent personally by me on the following activities:  Development of treatment plan with patient or surrogate; discussion with consultant, evaluation of patient's response to treatment, examination of patient, obtaining history from patient or surrogate, ordering and performing treatments and interventions, ordering and reviewing of laboratory studies, ordering and review of radiographic studies, vitals, re-evaluation of patient' condition and review of old charts            The failure to initiate the interventions performed in the Emergency Department on an urgent basis would have likely resulted in sudden, clinically significant or life threatening deterioration in the patient's condition.                              Maritza Morgan MD  02/02/20 7139

## 2020-02-02 NOTE — ED NOTES
Patient identifiers verified and correct for Fernie Rice  LOC: The patient is awake, alert and aware of environment with an appropriate affect, the patient is oriented x 3 and speaking appropriately.   APPEARANCE: Patient appears comfortable and in no acute distress, patient is clean and well groomed.  SKIN: The skin is warm and dry, color consistent with ethnicity, patient has normal skin turgor and moist mucus membranes, skin intact, no breakdown or bruising noted. Pt has fever of 103  MUSCULOSKELETAL: Patient moving all extremities spontaneously, no swelling noted.  RESPIRATORY: Airway is open and patent, respirations are spontaneous, patient has a normal effort and rate, no accessory muscle use noted, pt placed on continuous pulse ox with O2 sats noted at 96% on room air.  CARDIAC: Pt placed on cardiac monitor. Patient has a tachy rate, no edema noted, capillary refill < 3 seconds.   GASTRO: Soft and non tender to palpation, no distention noted, normoactive bowel sounds present in all four quadrants. Pt states bowel movements have been regular. Pt reports nausea with emesis.   : Pt denies any pain or frequency with urination.  NEURO: Pt opens eyes spontaneously, behavior appropriate to situation, follows commands, facial expression symmetrical, bilateral hand grasp equal and even, purposeful motor response noted, normal sensation in all extremities when touched with a finger.

## 2020-02-02 NOTE — ED TRIAGE NOTES
Pt reports tremors last night with intermittent fevers. Pt reports emesis x5 since last night. Pt is diaphoretic.

## 2020-02-03 PROBLEM — Z75.8 DISCHARGE PLANNING ISSUES: Status: ACTIVE | Noted: 2020-02-03

## 2020-02-03 PROBLEM — F12.20 MODERATE TETRAHYDROCANNABINOL (THC) DEPENDENCE: Status: ACTIVE | Noted: 2020-02-03

## 2020-02-03 PROBLEM — N17.9 AKI (ACUTE KIDNEY INJURY): Status: ACTIVE | Noted: 2020-02-03

## 2020-02-03 PROBLEM — Q62.5: Status: ACTIVE | Noted: 2020-02-03

## 2020-02-03 PROBLEM — N13.8: Status: ACTIVE | Noted: 2020-02-03

## 2020-02-03 PROBLEM — Q62.5 DUPLICATED URETER, RIGHT: Status: ACTIVE | Noted: 2020-02-03

## 2020-02-03 LAB
ALBUMIN SERPL BCP-MCNC: 2.4 G/DL (ref 3.5–5.2)
ALP SERPL-CCNC: 75 U/L (ref 55–135)
ALT SERPL W/O P-5'-P-CCNC: 16 U/L (ref 10–44)
ANION GAP SERPL CALC-SCNC: 10 MMOL/L (ref 8–16)
AST SERPL-CCNC: 26 U/L (ref 10–40)
BACTERIA UR CULT: NO GROWTH
BASOPHILS # BLD AUTO: 0.04 K/UL (ref 0–0.2)
BASOPHILS NFR BLD: 0.3 % (ref 0–1.9)
BILIRUB SERPL-MCNC: 1.1 MG/DL (ref 0.1–1)
BUN SERPL-MCNC: 6 MG/DL (ref 6–20)
C TRACH DNA SPEC QL NAA+PROBE: NOT DETECTED
CALCIUM SERPL-MCNC: 8 MG/DL (ref 8.7–10.5)
CHLORIDE SERPL-SCNC: 104 MMOL/L (ref 95–110)
CO2 SERPL-SCNC: 22 MMOL/L (ref 23–29)
CREAT SERPL-MCNC: 0.7 MG/DL (ref 0.5–1.4)
DIFFERENTIAL METHOD: ABNORMAL
EOSINOPHIL # BLD AUTO: 0.4 K/UL (ref 0–0.5)
EOSINOPHIL NFR BLD: 2.6 % (ref 0–8)
ERYTHROCYTE [DISTWIDTH] IN BLOOD BY AUTOMATED COUNT: 13.8 % (ref 11.5–14.5)
EST. GFR  (AFRICAN AMERICAN): >60 ML/MIN/1.73 M^2
EST. GFR  (NON AFRICAN AMERICAN): >60 ML/MIN/1.73 M^2
GLUCOSE SERPL-MCNC: 91 MG/DL (ref 70–110)
GRAM STN SPEC: NORMAL
HCT VFR BLD AUTO: 35.8 % (ref 40–54)
HGB BLD-MCNC: 11.5 G/DL (ref 14–18)
HIV 1+2 AB+HIV1 P24 AG SERPL QL IA: NEGATIVE
IGA SERPL-MCNC: 255 MG/DL (ref 40–350)
IGE SERPL-ACNC: 756 IU/ML (ref 0–100)
IGG SERPL-MCNC: 975 MG/DL (ref 650–1600)
IGM SERPL-MCNC: 133 MG/DL (ref 50–300)
IMM GRANULOCYTES # BLD AUTO: 0.07 K/UL (ref 0–0.04)
IMM GRANULOCYTES NFR BLD AUTO: 0.5 % (ref 0–0.5)
LYMPHOCYTES # BLD AUTO: 1.4 K/UL (ref 1–4.8)
LYMPHOCYTES NFR BLD: 10.1 % (ref 18–48)
MAGNESIUM SERPL-MCNC: 1.7 MG/DL (ref 1.6–2.6)
MCH RBC QN AUTO: 29.3 PG (ref 27–31)
MCHC RBC AUTO-ENTMCNC: 32.1 G/DL (ref 32–36)
MCV RBC AUTO: 91 FL (ref 82–98)
MONOCYTES # BLD AUTO: 0.8 K/UL (ref 0.3–1)
MONOCYTES NFR BLD: 6.1 % (ref 4–15)
N GONORRHOEA DNA SPEC QL NAA+PROBE: NOT DETECTED
NEUTROPHILS # BLD AUTO: 11 K/UL (ref 1.8–7.7)
NEUTROPHILS NFR BLD: 80.4 % (ref 38–73)
NRBC BLD-RTO: 0 /100 WBC
PLATELET # BLD AUTO: 282 K/UL (ref 150–350)
PMV BLD AUTO: 9.5 FL (ref 9.2–12.9)
POTASSIUM SERPL-SCNC: 3.7 MMOL/L (ref 3.5–5.1)
PROT SERPL-MCNC: 6.2 G/DL (ref 6–8.4)
RBC # BLD AUTO: 3.92 M/UL (ref 4.6–6.2)
SODIUM SERPL-SCNC: 136 MMOL/L (ref 136–145)
WBC # BLD AUTO: 13.66 K/UL (ref 3.9–12.7)

## 2020-02-03 PROCEDURE — 82784 ASSAY IGA/IGD/IGG/IGM EACH: CPT

## 2020-02-03 PROCEDURE — 85025 COMPLETE CBC W/AUTO DIFF WBC: CPT

## 2020-02-03 PROCEDURE — 86592 SYPHILIS TEST NON-TREP QUAL: CPT

## 2020-02-03 PROCEDURE — 82784 ASSAY IGA/IGD/IGG/IGM EACH: CPT | Mod: 59

## 2020-02-03 PROCEDURE — 97161 PT EVAL LOW COMPLEX 20 MIN: CPT

## 2020-02-03 PROCEDURE — 99223 1ST HOSP IP/OBS HIGH 75: CPT | Mod: ,,, | Performed by: INTERNAL MEDICINE

## 2020-02-03 PROCEDURE — 25000003 PHARM REV CODE 250: Performed by: STUDENT IN AN ORGANIZED HEALTH CARE EDUCATION/TRAINING PROGRAM

## 2020-02-03 PROCEDURE — 63600175 PHARM REV CODE 636 W HCPCS: Performed by: STUDENT IN AN ORGANIZED HEALTH CARE EDUCATION/TRAINING PROGRAM

## 2020-02-03 PROCEDURE — 11000001 HC ACUTE MED/SURG PRIVATE ROOM

## 2020-02-03 PROCEDURE — 36415 COLL VENOUS BLD VENIPUNCTURE: CPT

## 2020-02-03 PROCEDURE — 80053 COMPREHEN METABOLIC PANEL: CPT

## 2020-02-03 PROCEDURE — 83735 ASSAY OF MAGNESIUM: CPT

## 2020-02-03 PROCEDURE — 93010 ELECTROCARDIOGRAM REPORT: CPT | Mod: ,,, | Performed by: INTERNAL MEDICINE

## 2020-02-03 PROCEDURE — 93005 ELECTROCARDIOGRAM TRACING: CPT

## 2020-02-03 PROCEDURE — 93010 EKG 12-LEAD: ICD-10-PCS | Mod: ,,, | Performed by: INTERNAL MEDICINE

## 2020-02-03 PROCEDURE — 86703 HIV-1/HIV-2 1 RESULT ANTBDY: CPT

## 2020-02-03 PROCEDURE — 25500020 PHARM REV CODE 255: Performed by: EMERGENCY MEDICINE

## 2020-02-03 PROCEDURE — 97165 OT EVAL LOW COMPLEX 30 MIN: CPT

## 2020-02-03 PROCEDURE — 87040 BLOOD CULTURE FOR BACTERIA: CPT | Mod: 59

## 2020-02-03 PROCEDURE — 82785 ASSAY OF IGE: CPT

## 2020-02-03 PROCEDURE — 99223 PR INITIAL HOSPITAL CARE,LEVL III: ICD-10-PCS | Mod: ,,, | Performed by: INTERNAL MEDICINE

## 2020-02-03 RX ORDER — HYDROMORPHONE HYDROCHLORIDE 1 MG/ML
1 INJECTION, SOLUTION INTRAMUSCULAR; INTRAVENOUS; SUBCUTANEOUS
Status: DISCONTINUED | OUTPATIENT
Start: 2020-02-03 | End: 2020-02-06

## 2020-02-03 RX ORDER — DEXTROSE MONOHYDRATE 100 MG/ML
12.5 INJECTION, SOLUTION INTRAVENOUS
Status: DISCONTINUED | OUTPATIENT
Start: 2020-02-03 | End: 2020-02-07 | Stop reason: HOSPADM

## 2020-02-03 RX ORDER — AMOXICILLIN 250 MG
1 CAPSULE ORAL DAILY
Status: DISCONTINUED | OUTPATIENT
Start: 2020-02-03 | End: 2020-02-04

## 2020-02-03 RX ORDER — IBUPROFEN 200 MG
24 TABLET ORAL
Status: DISCONTINUED | OUTPATIENT
Start: 2020-02-03 | End: 2020-02-07 | Stop reason: HOSPADM

## 2020-02-03 RX ORDER — INSULIN ASPART 100 [IU]/ML
0-5 INJECTION, SOLUTION INTRAVENOUS; SUBCUTANEOUS
Status: DISCONTINUED | OUTPATIENT
Start: 2020-02-03 | End: 2020-02-07 | Stop reason: HOSPADM

## 2020-02-03 RX ORDER — NALOXONE HCL 0.4 MG/ML
0.4 VIAL (ML) INJECTION
Status: DISCONTINUED | OUTPATIENT
Start: 2020-02-03 | End: 2020-02-06

## 2020-02-03 RX ORDER — MORPHINE SULFATE 2 MG/ML
4 INJECTION, SOLUTION INTRAMUSCULAR; INTRAVENOUS EVERY 4 HOURS PRN
Status: DISCONTINUED | OUTPATIENT
Start: 2020-02-03 | End: 2020-02-03

## 2020-02-03 RX ORDER — IPRATROPIUM BROMIDE AND ALBUTEROL SULFATE 2.5; .5 MG/3ML; MG/3ML
3 SOLUTION RESPIRATORY (INHALATION) EVERY 4 HOURS PRN
Status: DISCONTINUED | OUTPATIENT
Start: 2020-02-03 | End: 2020-02-07 | Stop reason: HOSPADM

## 2020-02-03 RX ORDER — TALC
6 POWDER (GRAM) TOPICAL NIGHTLY PRN
Status: DISCONTINUED | OUTPATIENT
Start: 2020-02-03 | End: 2020-02-04

## 2020-02-03 RX ORDER — ACETAMINOPHEN 325 MG/1
650 TABLET ORAL EVERY 4 HOURS PRN
Status: DISCONTINUED | OUTPATIENT
Start: 2020-02-03 | End: 2020-02-07 | Stop reason: HOSPADM

## 2020-02-03 RX ORDER — IBUPROFEN 200 MG
16 TABLET ORAL
Status: DISCONTINUED | OUTPATIENT
Start: 2020-02-03 | End: 2020-02-07 | Stop reason: HOSPADM

## 2020-02-03 RX ORDER — OXYCODONE HYDROCHLORIDE 5 MG/1
5 TABLET ORAL EVERY 4 HOURS PRN
Status: DISCONTINUED | OUTPATIENT
Start: 2020-02-03 | End: 2020-02-04

## 2020-02-03 RX ORDER — ONDANSETRON 2 MG/ML
4 INJECTION INTRAMUSCULAR; INTRAVENOUS EVERY 8 HOURS PRN
Status: DISCONTINUED | OUTPATIENT
Start: 2020-02-03 | End: 2020-02-07 | Stop reason: HOSPADM

## 2020-02-03 RX ORDER — SODIUM CHLORIDE 0.9 % (FLUSH) 0.9 %
10 SYRINGE (ML) INJECTION
Status: DISCONTINUED | OUTPATIENT
Start: 2020-02-03 | End: 2020-02-07 | Stop reason: HOSPADM

## 2020-02-03 RX ORDER — GLUCAGON 1 MG
1 KIT INJECTION
Status: DISCONTINUED | OUTPATIENT
Start: 2020-02-03 | End: 2020-02-07 | Stop reason: HOSPADM

## 2020-02-03 RX ORDER — LORAZEPAM 0.5 MG/1
0.5 TABLET ORAL ONCE
Status: COMPLETED | OUTPATIENT
Start: 2020-02-03 | End: 2020-02-03

## 2020-02-03 RX ORDER — POLYETHYLENE GLYCOL 3350 17 G/17G
17 POWDER, FOR SOLUTION ORAL DAILY
Status: DISCONTINUED | OUTPATIENT
Start: 2020-02-03 | End: 2020-02-07 | Stop reason: HOSPADM

## 2020-02-03 RX ORDER — DEXTROSE MONOHYDRATE 100 MG/ML
25 INJECTION, SOLUTION INTRAVENOUS
Status: DISCONTINUED | OUTPATIENT
Start: 2020-02-03 | End: 2020-02-07 | Stop reason: HOSPADM

## 2020-02-03 RX ADMIN — OXYCODONE HYDROCHLORIDE 5 MG: 5 TABLET ORAL at 10:02

## 2020-02-03 RX ADMIN — MORPHINE SULFATE 4 MG: 2 INJECTION, SOLUTION INTRAMUSCULAR; INTRAVENOUS at 05:02

## 2020-02-03 RX ADMIN — ACETAMINOPHEN 650 MG: 325 TABLET ORAL at 12:02

## 2020-02-03 RX ADMIN — OXYCODONE HYDROCHLORIDE 5 MG: 5 TABLET ORAL at 02:02

## 2020-02-03 RX ADMIN — ACETAMINOPHEN 650 MG: 325 TABLET ORAL at 06:02

## 2020-02-03 RX ADMIN — HYDROMORPHONE HYDROCHLORIDE 1 MG: 1 INJECTION, SOLUTION INTRAMUSCULAR; INTRAVENOUS; SUBCUTANEOUS at 12:02

## 2020-02-03 RX ADMIN — LORAZEPAM 0.5 MG: 0.5 TABLET ORAL at 05:02

## 2020-02-03 RX ADMIN — HYDROMORPHONE HYDROCHLORIDE 1 MG: 1 INJECTION, SOLUTION INTRAMUSCULAR; INTRAVENOUS; SUBCUTANEOUS at 09:02

## 2020-02-03 RX ADMIN — HYDROMORPHONE HYDROCHLORIDE 1 MG: 1 INJECTION, SOLUTION INTRAMUSCULAR; INTRAVENOUS; SUBCUTANEOUS at 03:02

## 2020-02-03 RX ADMIN — CEFTRIAXONE SODIUM 1 G: 1 INJECTION, POWDER, FOR SOLUTION INTRAMUSCULAR; INTRAVENOUS at 01:02

## 2020-02-03 RX ADMIN — Medication 6 MG: at 09:02

## 2020-02-03 RX ADMIN — SENNOSIDES AND DOCUSATE SODIUM 1 TABLET: 8.6; 5 TABLET ORAL at 09:02

## 2020-02-03 RX ADMIN — SODIUM CHLORIDE 1000 ML: 0.9 INJECTION, SOLUTION INTRAVENOUS at 12:02

## 2020-02-03 RX ADMIN — OXYCODONE HYDROCHLORIDE 5 MG: 5 TABLET ORAL at 06:02

## 2020-02-03 NOTE — HOSPITAL COURSE
Patient with duplicated ureteral system here for complicated UTI requiring placement of 2 R nephrostomy tubes admitted to IM3. Continuing ceftriaxone, all cultures remain no growth to date. Tachycardia, febrile periods and pain continues at nephrostomy tube site. Urology following and will plan for ureterocele puncture when OP. Added robaxin, gabapentin and scheduled tylenol to assist with pain control. Abx adjusted as patient remains culture negative, continue cefdinir. Patient DC with referral to Urology.   Of note: patient with recurrent infections- with history of severe asthma and eczema.  Note increasing peripheral eosinophils on CBC; no allergic symptoms at this time.  Pt does not meet criteria for hyper IgE syndrome, nor selective IgA deficiency.  HIV and RPR negative this admission.  Pt would benefit from further outpatient w/u with Allergy/ Immunology, referral placed.Immunology.

## 2020-02-03 NOTE — ASSESSMENT & PLAN NOTE
19 y.o. male with a history of asthma, ADHD, esophagitis who presents due to right-sided pyelonephritis and concern for ureteral obstruction in the setting of a complete right-sided duplicated system.    - Given anatomy and patient's clinical status, would not be able to safely and quickly place ureteral stents retrograde.  - Recommend urgent IR right upper and lower pole nephrostomy tube placement.  - NPO. Last meal was yesterday and has had sips of water in the ED.  - Continue empiric Rocephin. Follow-up urine culture and tailor abx to susceptibilities.  - Recommend medicine vs MICU admission.

## 2020-02-03 NOTE — NURSING
edu pt on importance of ambulating encouraged pt to ambulate and to increase movement pt verbalized understanding

## 2020-02-03 NOTE — NURSING
Right Nephrostomy tubes x's 2 to right flank area inserted by MD, dressing CDI, labs collected and sent, report given to ED RN,pt tolerated well

## 2020-02-03 NOTE — ASSESSMENT & PLAN NOTE
Patient with complicated right duplicate ureteral system with hydronephrosis and now pyelonephritis requiring nephrostomy tube placement. Urology following. Patient s/p R nephrostomy tubes to R upper and lower poles on 2/3. Patient with continued tachycardia, but very clear source of infection with UA and hydronephrosis. Micro studies of urine directly from R kidney are pending. Blood cultures pending. Patient empirically started on ceftriaxone. Will try to get his pain under better control as well, as that could also be contributing to tachycardia. His BP is stable in the 130s systolic, currently appears stable for the floor, but low threshold to consult ICU if hypotensive.    PLAN:  - continue ceftriaxone   - f/u urine culture and sensitivities  - f/u blood cultures  - f/u urology recs  - pain control  - monitor creatinine

## 2020-02-03 NOTE — ASSESSMENT & PLAN NOTE
- currently reports pain is mostly from nephrostomy tube site  - s/p orthopedic repair 1 mo ago  - continue pain control

## 2020-02-03 NOTE — ASSESSMENT & PLAN NOTE
19 y.o. male with a history of asthma and ADHD who presents due to right-sided pyelonephritis and concern for ureteral obstruction in the setting of a complete right-sided duplicated system.    - Continue drainage of duplicated system with upper and lower pole nephrostomy tubes   - Recommend ambulation in halls.   - Continue empiric Rocephin. Follow-up urine culture and tailor abx to susceptibilities.  - Continue care per primary

## 2020-02-03 NOTE — CARE UPDATE
Rapid Response Nurse Chart Check     Chart check completed, abnormal VS noted. Bedside RN Trace contacted, no concerns verbalized at this time, instructed to call [phone] for further concerns or assistance.

## 2020-02-03 NOTE — ASSESSMENT & PLAN NOTE
Given history of eczema, allergies, prior staph infections, pneumonia (although patient attributes that to vaping), and now duplicated ureter with pyelonephritis, may consider outpatient referral to immunologist for congenital immunodeficiency workup.    IgE elevated  No congenital facies to corroborate with hyper IgE  Will need further eval when OP

## 2020-02-03 NOTE — PROCEDURES
Radiology Post-Procedure Note    Pre Op Diagnosis: Urosepsis, obstructive right system    Post Op Diagnosis: Same    Procedure: Right neph tube    Procedure performed by: Emilio Rodriguez MD    Written Informed Consent Obtained: Yes  Specimen Removed: YES 30 cc cloudy urine  Estimated Blood Loss: Minimal    Findings:   Under US and fluoro guidance two neph tubes were placed in the right kidney (upper and lower moieties). No complications. Sample sent to lab for analysis.    Patient tolerated procedure well.    Emilio Rodriguez M.D.  Diagnostic and Interventional Radiologist  Department of Radiology  Pager: [phone]

## 2020-02-03 NOTE — CONSULTS
[facility]  Urology  Consult Note    Patient Name: Fernie Rice  MRN: [id number]  Admission Date: 2/2/2020  Hospital Length of Stay: 0   Code Status: Prior   Attending Provider: Maritza Morgan MD   Consulting Provider: Fernie Macedo MD  Primary Care Physician: Fernie Garcia MD  Principal Problem:Pyelonephritis    Inpatient consult to Urology  Consult performed by: Fernie Macedo MD  Consult ordered by: Maritza Morgan MD          Subjective:     HPI:  19 y.o. male with a history of asthma and ADHD who presents due to right flank pain coupled with subjective fevers and shaking chills. Patient states that he has had a 2 week history of intermittent dysuria for which he went to an urgent care on 1/22. No UA or urine culture was ordered but GC/chlamydia negative. In addition, he has had sharp intermittent right flank pain for the past two weeks. Beginning last night, he began to experience subjective fevers coupled with shaking chills. He has had nausea and vomiting today. Patient denies gross hematuria, frequency, urgency, suprapubic pain. No history of UTI's. In the ED, he is febrile to 103 and tachycardic to 141. His lactate on presentation was 2.7. His vitals and LA improved s/p 2.2 L of normal saline boluses. Clean catch UA was nitrite positive and showed 5 RBC/hpf, >100 WBC/hpf, and many bacteria. 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 He has never seen a urologist and was unaware of his duplicated system.    Past Medical History:   Diagnosis Date    ADHD (attention deficit hyperactivity disorder)     Asthma     Eczema     Esophagitis     Food impaction of esophagus     Multiple food allergies     Vernal keratoconjunctivitis        Past  Surgical History:   Procedure Laterality Date    ADENOIDECTOMY      CLOSED REDUCTION OF INJURY OF HAND WITH PERCUTANEOUS PINNING Right 1/2/2020    Procedure: CLOSED REDUCTION, HAND, WITH PERCUTANEOUS PINNING;  Surgeon: Chace Jerez MD;  Location: [facility];  Service: Orthopedics;  Laterality: Right;    EYE SURGERY      ulcers removed    SINUS SURGERY         Review of patient's allergies indicates:   Allergen Reactions    Fish containing products Other (See Comments)    Peanut Anaphylaxis    Amoxicillin Hives     Tolerates ceftriaxone and cefepime    Bactrim [sulfamethoxazole-trimethoprim] Hives    Corn containing products Other (See Comments)    Soy fiber     Soy flour Dermatitis       Family History     None          Tobacco Use    Smoking status: Current Some Day Smoker    Smokeless tobacco: Never Used   Substance and Sexual Activity    Alcohol use: Yes     Alcohol/week: 20.0 standard drinks     Types: 20 Cans of beer per week    Drug use: Yes     Types: Marijuana    Sexual activity: Yes     Partners: Female     Birth control/protection: Condom     Comment: quit 2 weeks ago.       Review of Systems   Constitutional: Positive for activity change, appetite change, chills and fever.   HENT: Negative.    Eyes: Negative.    Respiratory: Negative for chest tightness, shortness of breath and stridor.    Cardiovascular: Negative for chest pain and palpitations.   Gastrointestinal: Positive for abdominal pain, nausea and vomiting.   Genitourinary: Positive for dysuria and flank pain. Negative for difficulty urinating, frequency, hematuria and urgency.   Musculoskeletal: Negative for arthralgias and gait problem.   Skin: Negative for color change and rash.   Neurological: Negative for dizziness and headaches.   Psychiatric/Behavioral: Negative for agitation and confusion.       Objective:     Temp:  [99.1 °F (37.3 °C)-103 °F (39.4 °C)] 99.3 °F (37.4 °C)  Pulse:  [] 96  Resp:  [18] 18  SpO2:  [94  %-100 %] 99 %  BP: (107-132)/(55-62) 132/62     Body mass index is 27.46 kg/m².    Date 02/02/20 0700 - 02/03/20 0659   Shift 8456-8621 4385-3657 2359-0771 24 Hour Total   INTAKE   IV Piggyback  2228  2228   Shift Total(mL/kg)  2228(30.7)  2228(30.7)   OUTPUT   Shift Total(mL/kg)       Weight (kg) 72.6 72.6 72.6 72.6          Drains     None                 Physical Exam   Nursing note and vitals reviewed.  Constitutional: He is oriented to person, place, and time. He appears well-developed and well-nourished. No distress.   HENT:   Head: Normocephalic and atraumatic.   Eyes: Pupils are equal, round, and reactive to light. Right eye exhibits no discharge. Left eye exhibits no discharge.   Cardiovascular: Normal rate.    Pulmonary/Chest: Effort normal. No stridor. No respiratory distress.   Abdominal: Soft. He exhibits no distension. There is no tenderness.   Right CVA tenderness   Neurological: He is alert and oriented to person, place, and time.   Skin: Skin is warm and dry.     Psychiatric: He has a normal mood and affect. His behavior is normal. Judgment and thought content normal.       Significant Labs:    BMP:  Recent Labs   Lab 02/02/20  1428      K 4.0   CL 99   CO2 25   BUN 12   CREATININE 1.0   CALCIUM 9.9       CBC:  Recent Labs   Lab 02/02/20  1428   WBC 14.55*   HGB 14.3   HCT 43.1   *       All pertinent labs results from the past 24 hours have been reviewed.    Significant Imaging:  CT Abdomen/Pelvis with Contrast (2/2/20):  - Bilateral adrenals unremarkable  - No left-sided stones, masses, or hydro  - Left ureter normal in course and caliber however difficult to visualize insertion into bladder, no left ureteral stones  - Complete duplication of right collecting system  - Several simple cysts right upper pole  - Calcifications right upper pole possibly intraparenchymal  - Right upper pole moiety hydronephrotic, no hydronephrosis of lower pole moiety  - Right upper pole ureter dilatated  and tortuous, right lower pole ureter dilatated  - Both right ureters demonstrate ureteroceles with lower pole ureterocele possibly inserting posterior to bladder neck      Assessment and Plan:     * Pyelonephritis  19 y.o. male with a history of asthma and ADHD who presents due to right-sided pyelonephritis and concern for ureteral obstruction in the setting of a complete right-sided duplicated system.    - Given anatomy and patient's clinical status, would not be able to safely and quickly place ureteral stents retrograde.  - Recommend urgent IR right upper and lower pole nephrostomy tube placement.  - NPO. Last meal was yesterday and has had sips of water in the ED.  - Continue empiric Rocephin. Follow-up urine cultures and tailor abx to susceptibilities.  - Recommend medicine vs MICU admission.        VTE Risk Mitigation (From admission, onward)    None          Thank you for your consult. I will follow-up with patient. Please contact us if you have any additional questions.    SHILPA Macedo MD  Urology  [facility]    I have reviewed the notes, assessments, and/or procedures performed by Dr. Macedo, I concur with her/his documentation of Fernie Rice. Patient s/p IR nephrostomy tube placement.  Continue broad antibiotics.  Will ultimately need f/u to arrange likely cystoscopy with possible ureterocele puncture after infection is cleared.

## 2020-02-03 NOTE — PLAN OF CARE
Patient lives at home with his mother. Independent with ADL'S. No discharge needs noted. Will continue to monitor until medically stable for discharge.     02/03/20 1128   Discharge Assessment   Assessment Type Discharge Planning Assessment   Confirmed/corrected address and phone number on facesheet? Yes   Assessment information obtained from? Patient   Expected Length of Stay (days)   (2)   Communicated expected length of stay with patient/caregiver yes   Prior to hospitilization cognitive status: Alert/Oriented   Prior to hospitalization functional status: Independent   Current cognitive status: Alert/Oriented   Current Functional Status: Independent   Facility Arrived From: Home   Lives With parent(s)   Able to Return to Prior Arrangements yes   Is patient able to care for self after discharge? Yes   Who are your caregiver(s) and their phone number(s)?   (Megan Alvarez (Mother) [phone])   Patient's perception of discharge disposition home or selfcare   Readmission Within the Last 30 Days no previous admission in last 30 days   Patient currently being followed by outpatient case management? No   Patient currently receives any other outside agency services? No   Equipment Currently Used at Home none   Do you have any problems affording any of your prescribed medications? No   Is the patient taking medications as prescribed? yes   Does the patient have transportation home? Yes   Transportation Anticipated family or friend will provide   Does the patient receive services at the [facility]? No   Discharge Plan A Home with family   Discharge Plan B Home with family   DME Needed Upon Discharge  none   Patient/Family in Agreement with Plan yes

## 2020-02-03 NOTE — ED NOTES
Pt. Taken to IR with RN on cardiac and o2 monitor. Report and care to Amee WADE in IR. Tele box requested, plan to transfer patient to [facility] post procedure.

## 2020-02-03 NOTE — H&P
[facility] Hospital Medicine  History & Physical    Patient Name: Fernie Rice  MRN: [id number]  Admission Date: 2/2/2020  Attending Physician: Destiny Connelly MD   Primary Care Provider: Fernie Garcia MD    [facility] Medicine Team: [facility] Adarsh Zayas MD     Patient information was obtained from patient, parent, caregiver / friend, past medical records and ER records.     Subjective:     Principal Problem:Pyelonephritis    Chief Complaint:   Chief Complaint   Patient presents with    Emesis     with abdominal pain,         HPI: Patient is a 19 M with PMH asthma, eczema (takes dupilumab), recurrent staph infections 2/2 itching his eczema on his arms, pneumonia 2/2 requiring a prior admission to the hospital, and ADHD (per the chart) who presented to [facility] for right sided abdominal pain and urinary burning.    Patient states that he had trauma to his right hand requiring screw placement by ortho about 1 month ago. About 2 weeks ago, he started noticing progressively worsening abdominal pain. He then started to have urinary burning and the abdominal pain became unbearable. He came to the ED on 2/2 for evaluation. He was febrile to 103 and tachycardic to the 180s (EKG sinus tach), SBP in 100s-110s. Septic workup initiated with UA, CXR, labs and lactic. White count elevated to 14, lactic 2.7, creatinine 1 (up from 0.7 at baseline), electrolytes normal, UA showing nitrites, >100 WBCs, and many bacteria, and CT A/P with contrast that showed duplicated R ureteral system with hydronephrosis. He was given 2L NS and ceftriaxone for complicated UTI in the ED and urology consulted. Urology recommended IR nephrostomy tube placement to R upper and lower poles. He went to IR and had neph tubes placed without complication.     He is admitted to 3 for further evaluation for complicated UTI. Urology following along.     HOSPITAL COURSE:  Patient with duplicated ureteral system here for  complicated UTI requiring placement of 2 R nephrostomy tubes admitted to IM3. Continuing ceftriaxone. He was continually tachycardic after IR neph tube placement. Another 1L of NS ordered, and pain medications ordered.     Past Medical History:   Diagnosis Date    ADHD (attention deficit hyperactivity disorder)     Asthma     Eczema     Esophagitis     Food impaction of esophagus     Multiple food allergies     Vernal keratoconjunctivitis        Past Surgical History:   Procedure Laterality Date    ADENOIDECTOMY      CLOSED REDUCTION OF INJURY OF HAND WITH PERCUTANEOUS PINNING Right 1/2/2020    Procedure: CLOSED REDUCTION, HAND, WITH PERCUTANEOUS PINNING;  Surgeon: Chace Jerez MD;  Location: [facility];  Service: Orthopedics;  Laterality: Right;    EYE SURGERY      ulcers removed    SINUS SURGERY         Review of patient's allergies indicates:   Allergen Reactions    Fish containing products Other (See Comments)    Peanut Anaphylaxis    Amoxicillin Hives     Tolerates ceftriaxone and cefepime    Bactrim [sulfamethoxazole-trimethoprim] Hives    Corn containing products Other (See Comments)    Soy fiber     Soy flour Dermatitis       No current facility-administered medications on file prior to encounter.      Current Outpatient Medications on File Prior to Encounter   Medication Sig    HYDROcodone-acetaminophen (NORCO) 5-325 mg per tablet Take 1 tablet by mouth every 6 (six) hours as needed for Pain.    acyclovir (ZOVIRAX) 200 mg/5 mL suspension Take 20 mLs (800 mg total) by mouth 3 (three) times daily. for 7 days (Patient taking differently: Take 800 mg by mouth 3 (three) times daily as needed. )    albuterol-ipratropium (DUO-NEB) 2.5 mg-0.5 mg/3 mL nebulizer solution Take 3 mLs by nebulization every 4 (four) hours as needed for Wheezing or Shortness of Breath. Rescue    gemifloxacin (FACTIVE) 320 mg tablet Take one po now    ondansetron (ZOFRAN-ODT) 4 MG TbDL Dissolve 1 tablet (4 mg total)  by mouth every 8 (eight) hours as needed. (Patient not taking: Reported on 11/4/2019)    ondansetron (ZOFRAN-ODT) 4 MG TbDL Take 2 tablets (8 mg total) by mouth every 8 (eight) hours as needed.    PROAIR HFA 90 mcg/actuation inhaler Inhale 1-2 puffs into the lungs every 6 (six) hours as needed for Wheezing or Shortness of Breath. Rescue     Family History     None        Tobacco Use    Smoking status: Current Some Day Smoker    Smokeless tobacco: Never Used   Substance and Sexual Activity    Alcohol use: Yes     Alcohol/week: 20.0 standard drinks     Types: 20 Cans of beer per week    Drug use: Yes     Types: Marijuana    Sexual activity: Yes     Partners: Female     Birth control/protection: Condom     Comment: quit 2 weeks ago.     Review of Systems   Constitutional: Positive for chills and fever.   HENT: Negative for hearing loss, sinus pain, sneezing and sore throat.    Eyes: Negative for photophobia and visual disturbance.   Respiratory: Positive for shortness of breath. Negative for cough, chest tightness and wheezing.    Cardiovascular: Positive for palpitations. Negative for chest pain and leg swelling.   Gastrointestinal: Positive for abdominal pain (right sided). Negative for constipation, diarrhea, nausea and vomiting.   Endocrine: Negative for polydipsia, polyphagia and polyuria.   Genitourinary: Positive for dysuria. Negative for hematuria and urgency.   Musculoskeletal: Negative for arthralgias and back pain.   Skin: Negative for rash.   Allergic/Immunologic: Negative for immunocompromised state.   Neurological: Negative for dizziness and headaches.   Hematological: Negative for adenopathy.   Psychiatric/Behavioral: Negative for agitation and behavioral problems.     Objective:     Vital Signs (Most Recent):  Temp: (!) 101.1 °F (38.4 °C) (02/03/20 0026)  Pulse: (!) 127 (02/03/20 0026)  Resp: 16 (02/02/20 2331)  BP: 132/75 (02/03/20 0026)  SpO2: (!) 94 % (02/02/20 2331) Vital Signs (24h  Range):  Temp:  [98.7 °F (37.1 °C)-103 °F (39.4 °C)] 101.1 °F (38.4 °C)  Pulse:  [] 127  Resp:  [16-26] 16  SpO2:  [94 %-100 %] 94 %  BP: (107-148)/(53-90) 132/75     Weight: 72.6 kg (160 lb)  Body mass index is 27.46 kg/m².    Physical Exam   Constitutional: He is oriented to person, place, and time. He appears well-developed and well-nourished. He appears distressed (due to pain and chills).   HENT:   Head: Atraumatic.   Eyes: Right eye exhibits no discharge. Left eye exhibits no discharge. No scleral icterus.   Neck: Neck supple. No JVD present.   Cardiovascular: Regular rhythm. Exam reveals no gallop and no friction rub.   No murmur heard.  Tachycardic   Pulmonary/Chest: Effort normal and breath sounds normal. No respiratory distress. He has no wheezes. He has no rales.   Abdominal: Soft. He exhibits no distension. There is tenderness (right sided tenderness. neph tube site on right flank clean dry and intact).   Musculoskeletal: He exhibits no edema or tenderness.   Neurological: He is alert and oriented to person, place, and time. No cranial nerve deficit or sensory deficit.   Skin: Skin is dry.   Psychiatric: He has a normal mood and affect.           Significant Labs:   Blood Culture:   Recent Labs   Lab 02/02/20  1428   LABBLOO No Growth to date  No Growth to date     CBC:   Recent Labs   Lab 02/02/20  1428   WBC 14.55*   HGB 14.3   HCT 43.1   *     CMP:   Recent Labs   Lab 02/02/20  1428      K 4.0   CL 99   CO2 25   *   BUN 12   CREATININE 1.0   CALCIUM 9.9   PROT 8.3   ALBUMIN 3.5   BILITOT 1.6*   ALKPHOS 103   AST 14   ALT 21   ANIONGAP 13   EGFRNONAA >60.0     Lactic Acid:   Recent Labs   Lab 02/02/20  1428 02/02/20  1840   LACTATE 2.7* 0.9     Troponin:   Recent Labs   Lab 02/02/20  1428   TROPONINI <0.006     Urine Culture: No results for input(s): LABURIN in the last 48 hours.  Urine Studies:   Recent Labs   Lab 02/02/20  1442   COLORU Franci   APPEARANCEUA Cloudy*   PHUR  6.0   SPECGRAV 1.015   PROTEINUA 2+*   GLUCUA Negative   KETONESU Negative   BILIRUBINUA Negative   OCCULTUA 2+*   NITRITE Positive*   LEUKOCYTESUR 3+*   RBCUA 5*   WBCUA >100*   BACTERIA Many*   HYALINECASTS 16*       Significant Imaging: I have reviewed all pertinent imaging results/findings within the past 24 hours.    Assessment/Plan:     * Pyelonephritis  Patient with complicated right duplicate ureteral system with hydronephrosis and now pyelonephritis requiring nephrostomy tube placement. Urology following. Patient s/p R nephrostomy tubes to R upper and lower poles on 2/3. Patient with continued tachycardia, but very clear source of infection with UA and hydronephrosis. Micro studies of urine directly from R kidney are pending. Blood cultures pending. Patient empirically started on ceftriaxone. Will try to get his pain under better control as well, as that could also be contributing to tachycardia. His BP is stable in the 130s systolic, currently appears stable for the floor, but low threshold to consult ICU if hypotensive.    PLAN:  - continue ceftriaxone   - f/u urine culture and sensitivities  - f/u blood cultures  - f/u urology recs  - consider broadening ABX coverage if persistently tachycardic/febrile  - low threshold to consult MICU if hypotensive or signs/symptoms of septic shock  - monitor respiratory status with IVF administration for tachycardia  - pain control  - monitor creatinine      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  See pyelonephritis      Sepsis  See pyelonephritis.      Closed displaced fracture of base of fifth metacarpal bone of right hand  - currently reports pain is mostly from nephrostomy tube site  - s/p orthopedic repair 1 mo ago  - continue pain control      VTE Risk Mitigation (From admission, onward)         Ordered     Place sequential compression device  Until discontinued      02/03/20 0001     IP VTE LOW RISK PATIENT  Once      02/03/20 0001                    Adarsh Zayas MD  Department of Hospital Medicine   [facility]

## 2020-02-03 NOTE — HPI
19 y.o. male with a history of asthma and ADHD who presents due to right flank pain coupled with subjective fevers and shaking chills. Patient states that he has had a 2 week history of intermittent dysuria for which he went to an urgent care on 1/22. No UA or urine culture was ordered but GC/chlamydia negative. In addition, he has had sharp intermittent right flank pain for the past two weeks. Beginning last night, he began to experience subjective fevers coupled with shaking chills. He has had nausea and vomiting today. Patient denies gross hematuria, frequency, urgency, suprapubic pain. No history of UTI's. In the ED, he is febrile to 103 and tachycardic to 141. His lactate on presentation was 2.7. His vitals and LA improved s/p 2.2 L of normal saline boluses. Clean catch UA was nitrite positive and showed 5 RBC/hpf, >100 WBC/hpf, and many bacteria. 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 He has never seen a urologist and was unaware of his duplicated system.

## 2020-02-03 NOTE — PT/OT/SLP EVAL
Occupational Therapy   Evaluation and Discharge Note    Name: Fernie Rice  MRN: [id number]  Admitting Diagnosis:  Pyelonephritis      Recommendations:     Discharge Recommendations: home  Discharge Equipment Recommendations:  none  Barriers to discharge:  None    Assessment:     Fernie Rice is a 19 y.o. male with a medical diagnosis of Pyelonephritis. At this time, patient is functioning at their prior level of function and does not require further acute OT services.     Plan:     During this hospitalization, patient does not require further acute OT services.  Please re-consult if situation changes.    · Plan of Care Reviewed with: patient, mother    Subjective     Chief Complaint: ab pain  Patient/Family Comments/goals: Return home    Occupational Profile:  Living Environment: Pt lives in a 2sh w/ mother w/ 3 steps to enter w/ no HR. Pt has 22 steps to bedroom on 2nd floor w/ L HR present.   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  Equipment Used at home:  none  Assistance upon Discharge: Pt has assistance upon D/C.     Pain/Comfort:  · Pain Rating 1: 9/10  · Location - Side 1: Right  · Location - Orientation 1: generalized  · Location 1: abdomen  · Pain Addressed 1: Reposition, Distraction  · Pain Rating Post-Intervention 1: (did not rate)    Patients cultural, spiritual, Mormonism conflicts given the current situation:      Objective:     Communicated with: RN prior to session.  Patient found HOB elevated with telemetry, nephrostomy upon OT entry to room.    General Precautions: Standard, fall   Orthopedic Precautions:N/A   Braces: N/A     Occupational Performance:    Bed Mobility:    · Patient completed Scooting/Bridging with independence  · Patient completed Supine to Sit with minimum assistance    Functional Mobility/Transfers:  · Patient completed Sit <> Stand Transfer with independence  with  no assistive device   · Patient completed Bed <> Chair Transfer  using Step Transfer technique with independence with no assistive device  · Functional Mobility: Pt ambualted ~4 steps from EOB to chair inde. Pt refused further ambulation.     Activities of Daily Living:  · Lower Body Dressing: independence donned shoes seated EOB    Cognitive/Visual Perceptual:  Cognitive/Psychosocial Skills:     -       Oriented to: Person, Place, Time and Situation   -       Follows Commands/attention:Follows multistep  commands  -       Communication: clear/fluent  -       Memory: No Deficits noted  -       Safety awareness/insight to disability: intact   -       Mood/Affect/Coping skills/emotional control: Appropriate to situation  Visual/Perceptual:      -Intact      Physical Exam:  Balance:    -       No noted deficits   Postural examination/scapula alignment:    -       Rounded shoulders  Skin integrity: Visible skin intact  Upper Extremity Range of Motion:     -       Right Upper Extremity: WFL  -       Left Upper Extremity: WFL  Upper Extremity Strength:    -       Right Upper Extremity: WFL  -       Left Upper Extremity: WFL   Strength:    -       Right Upper Extremity: WFL  -       Left Upper Extremity: WFL  Fine Motor Coordination:    -       Intact  Gross motor coordination:   WFL    AMPAC 6 Click ADL:  AMPAC Total Score: 24    Treatment & Education:  Pt educated on POC and importance of oob activities.    Education:    Patient left up in chair with all lines intact and call button in reach    GOALS:   Multidisciplinary Problems     Occupational Therapy Goals     Not on file          Multidisciplinary Problems (Resolved)        Problem: Occupational Therapy Goal    Goal Priority Disciplines Outcome Interventions   Occupational Therapy Goal   (Resolved)     OT, PT/OT Met    Description:  Pt is not currently displaying a need for acute OT services. D/C acute OT services and recommend pt D/C home.                    History:     Past Medical History:   Diagnosis Date    ADHD  (attention deficit hyperactivity disorder)     Asthma     Eczema     Esophagitis     Food impaction of esophagus     Multiple food allergies     Vernal keratoconjunctivitis        Past Surgical History:   Procedure Laterality Date    ADENOIDECTOMY      CLOSED REDUCTION OF INJURY OF HAND WITH PERCUTANEOUS PINNING Right 1/2/2020    Procedure: CLOSED REDUCTION, HAND, WITH PERCUTANEOUS PINNING;  Surgeon: Chace Jerez MD;  Location: [facility];  Service: Orthopedics;  Laterality: Right;    EYE SURGERY      ulcers removed    SINUS SURGERY         Time Tracking:     OT Date of Treatment: 02/03/20  OT Start Time: 1120  OT Stop Time: 1134  OT Total Time (min): 14 min    Billable Minutes:Evaluation 14 minutes    Ben Goodwin, OT  2/3/2020

## 2020-02-03 NOTE — SUBJECTIVE & OBJECTIVE
Interval History:   Patient continued to have low grade fevers and tachycardia overnight. Endorses right flank pain that is moderately controlled. Has not ambulated yet. Voiding well and nephrostomy tube draining pink urine.       Objective:     Temp:  [98 °F (36.7 °C)-103 °F (39.4 °C)] 98 °F (36.7 °C)  Pulse:  [] 90  Resp:  [16-26] 16  SpO2:  [94 %-100 %] 96 %  BP: (107-148)/(53-90) 125/65     Body mass index is 27.46 kg/m².           Drains     Drain                 Nephrostomy 02/02/20 2252 Right 8 Fr. less than 1 day         Nephrostomy 02/02/20 2252 Right 8 Fr. less than 1 day                Physical Exam   Nursing note and vitals reviewed.  Constitutional: He is oriented to person, place, and time. He appears well-developed and well-nourished. No distress.   HENT:   Head: Normocephalic and atraumatic.   Eyes: Pupils are equal, round, and reactive to light. Right eye exhibits no discharge. Left eye exhibits no discharge.   Cardiovascular: Normal rate.    Pulmonary/Chest: Effort normal. No stridor. No respiratory distress.   Abdominal: Soft. He exhibits no distension. There is no tenderness.   Right CVA tenderness    Right nephrostomy tubes X2 to gravity. Draining well.    Neurological: He is alert and oriented to person, place, and time.   Skin: Skin is warm and dry.     Psychiatric: He has a normal mood and affect. His behavior is normal. Judgment and thought content normal.       Significant Labs:    BMP:  Recent Labs   Lab 02/02/20  1428      K 4.0   CL 99   CO2 25   BUN 12   CREATININE 1.0   CALCIUM 9.9       CBC:   Recent Labs   Lab 02/02/20  1428   WBC 14.55*   HGB 14.3   HCT 43.1   *

## 2020-02-03 NOTE — CARE UPDATE
Rapid Response Nurse Chart Check     Chart check completed, abnormal VS noted. Please call [phone] for further concerns or assistance.

## 2020-02-03 NOTE — PLAN OF CARE
Patient resting in bed. iv intact and free of infection and irration,fall precautions maintained no falls noted. Call light in reach bed locked and in lowest position. Non skid socks on while out of bed. Patient instructed to call for assistance. Skin integrity maintained as patient is independent with frequent positioning, C/o pain managed with PRN meds around the clock, med 3 paged concerning pt pain and restlessness md ordered ativan. No other complaints or concerns. Will continue to monitor and follow careplan of care.

## 2020-02-03 NOTE — PLAN OF CARE
Problem: Physical Therapy Goal  Goal: Physical Therapy Goal  Description  Goals to be met by: 2020    Patient will increase functional independence with mobility by performin. Gait  x 250 feet with Supervision and no assistive device.   2. Ascend/descend 4 stairs with left Handrails Supervision.        Outcome: Ongoing, Progressing   Patient cooperative and agreeable to therapy. Patient demonstrates decreased motivation secondary to pain and good functional mobility. Patient requires only supervision for bed mobility and transfers secondary to pain. Patient deferred ambulation secondary to increased pain with mobility. Patient with no apparent physical therapy needs at this time, but PT will continue to follow while in house to assess ambulation and stair training so patient is safe to discharge home. Plan of care initiated. Patient will benefit from skilled physical therapy while in acute care setting for safety during functional mobility.

## 2020-02-03 NOTE — PROGRESS NOTES
[facility]  Urology  Progress Note    Patient Name: Fernie Rice  MRN: [id number]  Admission Date: 2/2/2020  Hospital Length of Stay: 1 days  Code Status: Prior   Attending Provider: Destiny Connelly MD   Primary Care Physician: Fernie Garcia MD    Subjective:     HPI:  19 y.o. male with a history of asthma and ADHD who presents due to right flank pain coupled with subjective fevers and shaking chills. Patient states that he has had a 2 week history of intermittent dysuria for which he went to an urgent care on 1/22. No UA or urine culture was ordered but GC/chlamydia negative. In addition, he has had sharp intermittent right flank pain for the past two weeks. Beginning last night, he began to experience subjective fevers coupled with shaking chills. He has had nausea and vomiting today. Patient denies gross hematuria, frequency, urgency, suprapubic pain. No history of UTI's. In the ED, he is febrile to 103 and tachycardic to 141. His lactate on presentation was 2.7. His vitals and LA improved s/p 2.2 L of normal saline boluses. Clean catch UA was nitrite positive and showed 5 RBC/hpf, >100 WBC/hpf, and many bacteria. 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 He has never seen a urologist and was unaware of his duplicated system.    Interval History:   Patient continued to have low grade fevers and tachycardia overnight. Endorses right flank pain that is moderately controlled. Has not ambulated yet. Voiding well and nephrostomy tube draining pink urine.       Objective:     Temp:  [98 °F (36.7 °C)-103 °F (39.4 °C)] 98 °F (36.7 °C)  Pulse:  [] 90  Resp:  [16-26] 16  SpO2:  [94 %-100 %] 96 %  BP: (107-148)/(53-90) 125/65     Body mass index is 27.46 kg/m².           Drains      Drain                 Nephrostomy 02/02/20 2252 Right 8 Fr. less than 1 day         Nephrostomy 02/02/20 2252 Right 8 Fr. less than 1 day                Physical Exam   Nursing note and vitals reviewed.  Constitutional: He is oriented to person, place, and time. He appears well-developed and well-nourished. No distress.   HENT:   Head: Normocephalic and atraumatic.   Eyes: Pupils are equal, round, and reactive to light. Right eye exhibits no discharge. Left eye exhibits no discharge.   Cardiovascular: Normal rate.    Pulmonary/Chest: Effort normal. No stridor. No respiratory distress.   Abdominal: Soft. He exhibits no distension. There is no tenderness.   Right CVA tenderness    Right nephrostomy tubes X2 to gravity. Draining well.    Neurological: He is alert and oriented to person, place, and time.   Skin: Skin is warm and dry.     Psychiatric: He has a normal mood and affect. His behavior is normal. Judgment and thought content normal.       Significant Labs:    BMP:  Recent Labs   Lab 02/02/20  1428      K 4.0   CL 99   CO2 25   BUN 12   CREATININE 1.0   CALCIUM 9.9       CBC:   Recent Labs   Lab 02/02/20  1428   WBC 14.55*   HGB 14.3   HCT 43.1   *                   Assessment/Plan:     * Pyelonephritis  19 y.o. male with a history of asthma and ADHD who presents due to right-sided pyelonephritis and concern for ureteral obstruction in the setting of a complete right-sided duplicated system.    - Continue drainage of duplicated system with upper and lower pole nephrostomy tubes   - Recommend ambulation in halls.   - Continue empiric Rocephin. Follow-up urine culture and tailor abx to susceptibilities.  - Continue care per primary        VTE Risk Mitigation (From admission, onward)         Ordered     Place sequential compression device  Until discontinued      02/03/20 0001     IP VTE LOW RISK PATIENT  Once      02/03/20 0001                Celia Valdes MD  Urology  [facility]  [place]

## 2020-02-03 NOTE — PT/OT/SLP EVAL
"Physical Therapy Evaluation    Patient Name:  Fernie Rice   MRN:  [id number]    Recommendations:     Discharge Recommendations:  home   Discharge Equipment Recommendations: none   Barriers to discharge: None    Assessment:       Fernie Rice is a 19 y.o. male admitted with a medical diagnosis of Pyelonephritis.  He presents with the following impairments/functional limitations:  pain, decreased safety awareness, impaired endurance.      Patient cooperative and agreeable to therapy. Patient demonstrates decreased motivation secondary to pain and good functional mobility. Patient requires only supervision for bed mobility and transfers secondary to pain. Patient deferred ambulation secondary to increased pain with mobility. Patient with no apparent physical therapy needs at this time, but PT will continue to follow while in house to assess ambulation and stair training so patient is safe to discharge home.     Rehab Prognosis: Good; patient would benefit from acute skilled PT services 2 x/week to address these deficits and reach maximum level of function.  Patient is most appropriate to go to home .  Recent Surgery: * No surgery found *      Plan:     During this hospitalization, patient to be seen 2 x/week to address the identified rehab impairments via therapeutic activities, therapeutic exercises, gait training and progress toward the following goals:    · Plan of Care Expires:  03/04/20    Subjective     Subjective:"It hurts when I move."  Pain/Comfort:  Pain Rating 1: 9/10  Location - Side 1: Right  Location - Orientation 1: generalized  Location 1: abdomen  Pain Addressed 1: Pre-medicate for activity, Reposition, Distraction, Cessation of Activity  Pain Rating Post-Intervention 1: (Did not rate)    Patients cultural, spiritual, Gnosticist conflicts given the current situation: no    Living Environment:  Patient lives with his mom in a two story house with three steps to enter and no handrails. Patient " lives on second floor with 22 stairs and left handrail when ascending. Equipment available at home: none.   Upon discharge, patient will have assistance from mom and friends.  Objective:     Communicated with RN prior to session.  Patient found supine with nephrostomy, telemetry  upon PT entry to room.    General Precautions: Standard, fall   Orthopedic Precautions:N/A   Braces: N/A     Exams:  · RLE ROM: WFL  · RLE Strength: WFL  · LLE ROM: WFL  · LLE Strength: WFL      Functional Mobility:  · Bed Mobility:     · Supine to Sit: supervision  · Transfers:     · Sit to Stand:  supervision with no AD  · Bed to Chair: supervision with  no AD  using  Step Transfer  · Gait: Deferred secondary to pain.  · Balance: Good sitting edge of bed and standing      Therapeutic Activities and Exercises:   Patient with assistance as noted above. Patient with good activity tolerance, limited secondary only to pain.     Patient Education:    Patient and Mother educated on home safety, risks of prolonged bed rest and benefits of ambulation while in hospital by explanation.  Patient was receptive to education and verbalizes understanding.     AM-PAC 6 CLICK MOBILITY  Total Score:24     Patient left up in chair with all lines intact and mother present.    GOALS:   Multidisciplinary Problems     Physical Therapy Goals        Problem: Physical Therapy Goal    Goal Priority Disciplines Outcome Goal Variances Interventions   Physical Therapy Goal     PT, PT/OT Ongoing, Progressing     Description:  Goals to be met by: 2020    Patient will increase functional independence with mobility by performin. Gait  x 250 feet with Supervision and no assistive device.   2. Ascend/descend 4 stairs with left Handrails Supervision.                         History:     Past Medical History:   Diagnosis Date    ADHD (attention deficit hyperactivity disorder)     Asthma     Eczema     Esophagitis     Food impaction of esophagus     Multiple  food allergies     Vernal keratoconjunctivitis        Past Surgical History:   Procedure Laterality Date    ADENOIDECTOMY      CLOSED REDUCTION OF INJURY OF HAND WITH PERCUTANEOUS PINNING Right 1/2/2020    Procedure: CLOSED REDUCTION, HAND, WITH PERCUTANEOUS PINNING;  Surgeon: Chace Jerez MD;  Location: [facility];  Service: Orthopedics;  Laterality: Right;    EYE SURGERY      ulcers removed    SINUS SURGERY         Time Tracking:     PT Received On: 02/03/20  PT Start Time: 1119     PT Stop Time: 1132  PT Total Time (min): 13 min     Billable Minutes: Evaluation 13 min       Jo Akhtar, DESTINEY  02/03/2020

## 2020-02-03 NOTE — ASSESSMENT & PLAN NOTE
Patient with complicated right duplicate ureteral system with hydronephrosis and now pyelonephritis requiring nephrostomy tube placement. Urology following. Patient s/p R nephrostomy tubes to R upper and lower poles on 2/3. Patient with continued tachycardia, but very clear source of infection with UA and hydronephrosis. Micro studies of urine directly from R kidney are pending. Blood cultures pending. Patient empirically started on ceftriaxone. Will try to get his pain under better control as well, as that could also be contributing to tachycardia. His BP is stable in the 130s systolic, currently appears stable for the floor, but low threshold to consult ICU if hypotensive.    PLAN:  - continue ceftriaxone   - f/u urine culture and sensitivities  - f/u blood cultures  - f/u urology recs  - low threshold to consult MICU if hypotensive or signs/symptoms of septic shock  - monitor respiratory status with IVF administration for tachycardia  - pain control  - monitor creatinine

## 2020-02-03 NOTE — H&P
Inpatient Radiology Pre-procedure Note    History of Present Illness:  Fernie Rice is a 19 y.o. male who presents for past medical history of ADHD, esophagitis, asthma who presents to the ED complaining of right-sided abdominal and chest pain intermittent for the last few weeks that he describes is a combination of sharp and dull with chills that started last night the rhinorrhea. Denies cough or shortness of breath or chest pain  Denies any dysuria frequency of urination or hematuria. Interventional radiology has been consulted for nephrostomy tube placement.    Admission H&P reviewed.  Past Medical History:   Diagnosis Date    ADHD (attention deficit hyperactivity disorder)     Asthma     Eczema     Esophagitis     Food impaction of esophagus     Multiple food allergies     Vernal keratoconjunctivitis      Past Surgical History:   Procedure Laterality Date    ADENOIDECTOMY      CLOSED REDUCTION OF INJURY OF HAND WITH PERCUTANEOUS PINNING Right 1/2/2020    Procedure: CLOSED REDUCTION, HAND, WITH PERCUTANEOUS PINNING;  Surgeon: Chace Jerez MD;  Location: [facility];  Service: Orthopedics;  Laterality: Right;    EYE SURGERY      ulcers removed    SINUS SURGERY         Review of Systems:   As documented in primary team H&P    Home Meds:   Prior to Admission medications    Medication Sig Start Date End Date Taking? Authorizing Provider   HYDROcodone-acetaminophen (NORCO) 5-325 mg per tablet Take 1 tablet by mouth every 6 (six) hours as needed for Pain. 1/2/20  Yes Chace Jerez MD   acyclovir (ZOVIRAX) 200 mg/5 mL suspension Take 20 mLs (800 mg total) by mouth 3 (three) times daily. for 7 days  Patient taking differently: Take 800 mg by mouth 3 (three) times daily as needed.  8/21/19 8/28/19  Vasile Key MD   albuterol-ipratropium (DUO-NEB) 2.5 mg-0.5 mg/3 mL nebulizer solution Take 3 mLs by nebulization every 4 (four) hours as needed for Wheezing or Shortness of Breath. Rescue 8/20/19  9/19/19  Brooks Chavis DO   gemifloxacin (FACTIVE) 320 mg tablet Take one po now 1/22/20   Horacio Hickey MD   ondansetron (ZOFRAN-ODT) 4 MG TbDL Dissolve 1 tablet (4 mg total) by mouth every 8 (eight) hours as needed.  Patient not taking: Reported on 11/4/2019 8/20/19   Brooks Chavis DO   ondansetron (ZOFRAN-ODT) 4 MG TbDL Take 2 tablets (8 mg total) by mouth every 8 (eight) hours as needed. 1/2/20   Chace Jerez MD   PROAIR HFA 90 mcg/actuation inhaler Inhale 1-2 puffs into the lungs every 6 (six) hours as needed for Wheezing or Shortness of Breath. Rescue 8/20/19   Brooks Chavis DO     Scheduled Meds:   Continuous Infusions:   PRN Meds:  Anticoagulants/Antiplatelets: no anticoagulation    Allergies:   Review of patient's allergies indicates:   Allergen Reactions    Fish containing products Other (See Comments)    Peanut Anaphylaxis    Amoxicillin Hives     Tolerates ceftriaxone and cefepime    Bactrim [sulfamethoxazole-trimethoprim] Hives    Corn containing products Other (See Comments)    Soy fiber     Soy flour Dermatitis     Sedation Hx: have not been any systemic reactions    Labs:  Recent Labs   Lab 02/02/20 2031   INR 1.0       Recent Labs   Lab 02/02/20  1428   WBC 14.55*   HGB 14.3   HCT 43.1   MCV 88   *      Recent Labs   Lab 02/02/20  1428   *      K 4.0   CL 99   CO2 25   BUN 12   CREATININE 1.0   CALCIUM 9.9   ALT 21   AST 14   ALBUMIN 3.5   BILITOT 1.6*         Vitals:  Temp: (!) 100.4 °F (38 °C) (02/02/20 2129)  Pulse: (!) 147 (02/02/20 2105)  Resp: 18 (02/02/20 1414)  BP: (!) 145/81 (02/02/20 2105)  SpO2: 99 % (02/02/20 2105)     Physical Exam:  ASA: II  Mallampati: II    General: no acute distress  Mental Status: alert and oriented to person, place and time  HEENT: normocephalic, atraumatic  Chest: unlabored breathing  Heart: regular heart rate  Abdomen: nondistended  Extremity: moves all extremities    Plan: Will plan for right sided nephrostomy tube  placement.  Sedation Plan: Jose Stevenson M.D.  PGY-3  Radiology

## 2020-02-03 NOTE — PLAN OF CARE
Problem: Occupational Therapy Goal  Goal: Occupational Therapy Goal  Description  Pt is not currently displaying a need for acute OT services. D/C acute OT services and recommend pt D/C home.   Outcome: Met    Ben Goodwin OTR/L  2/3/2020

## 2020-02-03 NOTE — HPI
Patient is a 19 M with PMH asthma, eczema (takes dupilumab), recurrent staph infections 2/2 itching his eczema on his arms, pneumonia 2/2 requiring a prior admission to the hospital, and ADHD (per the chart) who presented to [facility] ED for right sided abdominal pain and urinary burning.    Patient states that he had trauma to his right hand requiring screw placement by ortho about 1 month ago. About 2 weeks ago, he started noticing progressively worsening abdominal pain. He then started to have urinary burning and the abdominal pain became unbearable. He came to the ED on 2/2 for evaluation. He was febrile to 103 and tachycardic to the 180s (EKG sinus tach), SBP in 100s-110s. Septic workup initiated with UA, CXR, labs and lactic. White count elevated to 14, lactic 2.7, creatinine 1 (up from 0.7 at baseline), electrolytes normal, UA showing nitrites, >100 WBCs, and many bacteria, and CT A/P with contrast that showed duplicated R ureteral system with hydronephrosis. He was given 2L NS and ceftriaxone for complicated UTI in the ED and urology consulted. Urology recommended IR nephrostomy tube placement to R upper and lower poles. He went to IR and had neph tubes placed without complication.     He is admitted to [facility] for further evaluation for complicated UTI. Urology following along.

## 2020-02-03 NOTE — NURSING
Pt arrived to IR Room 188 for nephrostomy tube placement. . Pt oriented to unit and staff. Plan of care reviewed with patient, patient verbalizes understanding. Comfort measures utilized. Pt safely transferred from stretcher to procedural table. Fall risk reviewed with patient, fall risk interventions maintained. Safety strap applied, positioner pillows utilized to minimize pressure points. Blankets applied. Pt prepped and draped utilizing standard sterile technique. Patient placed on continuous monitoring, as required by sedation policy. Timeouts completed utilizing standard universal time-out, per department and facility policy. RN to remain at bedside, continuous monitoring maintained. Pt resting comfortably. Denies pain/discomfort. Will continue to monitor. See flow sheets for monitoring, medication administration, and updates.

## 2020-02-03 NOTE — SUBJECTIVE & OBJECTIVE
Past Medical History:   Diagnosis Date    ADHD (attention deficit hyperactivity disorder)     Asthma     Eczema     Esophagitis     Food impaction of esophagus     Multiple food allergies     Vernal keratoconjunctivitis        Past Surgical History:   Procedure Laterality Date    ADENOIDECTOMY      CLOSED REDUCTION OF INJURY OF HAND WITH PERCUTANEOUS PINNING Right 1/2/2020    Procedure: CLOSED REDUCTION, HAND, WITH PERCUTANEOUS PINNING;  Surgeon: Chace Jerez MD;  Location: [facility];  Service: Orthopedics;  Laterality: Right;    EYE SURGERY      ulcers removed    SINUS SURGERY         Review of patient's allergies indicates:   Allergen Reactions    Fish containing products Other (See Comments)    Peanut Anaphylaxis    Amoxicillin Hives     Tolerates ceftriaxone and cefepime    Bactrim [sulfamethoxazole-trimethoprim] Hives    Corn containing products Other (See Comments)    Soy fiber     Soy flour Dermatitis       Family History     None          Tobacco Use    Smoking status: Current Some Day Smoker    Smokeless tobacco: Never Used   Substance and Sexual Activity    Alcohol use: Yes     Alcohol/week: 20.0 standard drinks     Types: 20 Cans of beer per week    Drug use: Yes     Types: Marijuana    Sexual activity: Yes     Partners: Female     Birth control/protection: Condom     Comment: quit 2 weeks ago.       Review of Systems   Constitutional: Positive for activity change, appetite change, chills and fever.   HENT: Negative.    Eyes: Negative.    Respiratory: Negative for chest tightness, shortness of breath and stridor.    Cardiovascular: Negative for chest pain and palpitations.   Gastrointestinal: Positive for abdominal pain, nausea and vomiting.   Genitourinary: Positive for dysuria and flank pain. Negative for difficulty urinating, frequency, hematuria and urgency.   Musculoskeletal: Negative for arthralgias and gait problem.   Skin: Negative for color change and rash.    Neurological: Negative for dizziness and headaches.   Psychiatric/Behavioral: Negative for agitation and confusion.       Objective:     Temp:  [99.1 °F (37.3 °C)-103 °F (39.4 °C)] 99.3 °F (37.4 °C)  Pulse:  [] 96  Resp:  [18] 18  SpO2:  [94 %-100 %] 99 %  BP: (107-132)/(55-62) 132/62     Body mass index is 27.46 kg/m².    Date 02/02/20 0700 - 02/03/20 0659   Shift 6717-0853 3151-6891 2409-4352 24 Hour Total   INTAKE   IV Piggyback  2228  2228   Shift Total(mL/kg)  2228(30.7)  2228(30.7)   OUTPUT   Shift Total(mL/kg)       Weight (kg) 72.6 72.6 72.6 72.6          Drains     None                 Physical Exam   Nursing note and vitals reviewed.  Constitutional: He is oriented to person, place, and time. He appears well-developed and well-nourished. No distress.   HENT:   Head: Normocephalic and atraumatic.   Eyes: Pupils are equal, round, and reactive to light. Right eye exhibits no discharge. Left eye exhibits no discharge.   Cardiovascular: Normal rate.    Pulmonary/Chest: Effort normal. No stridor. No respiratory distress.   Abdominal: Soft. He exhibits no distension. There is no tenderness.   Right CVA tenderness   Neurological: He is alert and oriented to person, place, and time.   Skin: Skin is warm and dry.     Psychiatric: He has a normal mood and affect. His behavior is normal. Judgment and thought content normal.       Significant Labs:    BMP:  Recent Labs   Lab 02/02/20  1428      K 4.0   CL 99   CO2 25   BUN 12   CREATININE 1.0   CALCIUM 9.9       CBC:  Recent Labs   Lab 02/02/20  1428   WBC 14.55*   HGB 14.3   HCT 43.1   *       All pertinent labs results from the past 24 hours have been reviewed.    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

## 2020-02-03 NOTE — ED NOTES
Tele box placed on patient per this RN, war room called, 116 sinus tach. Pt. Transported to 506 with RNx2. A&ox4 post procedure, gcs 15, calm, cooperative. Vital signs stable, /72 (86). Report and care to Trace RN.

## 2020-02-04 PROBLEM — N17.9 AKI (ACUTE KIDNEY INJURY): Status: RESOLVED | Noted: 2020-02-03 | Resolved: 2020-02-04

## 2020-02-04 PROBLEM — Z93.6 NEPHROSTOMY STATUS: Status: ACTIVE | Noted: 2020-02-04

## 2020-02-04 LAB
ALBUMIN SERPL BCP-MCNC: 2.5 G/DL (ref 3.5–5.2)
ALP SERPL-CCNC: 75 U/L (ref 55–135)
ALT SERPL W/O P-5'-P-CCNC: 17 U/L (ref 10–44)
ANION GAP SERPL CALC-SCNC: 13 MMOL/L (ref 8–16)
AST SERPL-CCNC: 23 U/L (ref 10–40)
BASOPHILS # BLD AUTO: 0.02 K/UL (ref 0–0.2)
BASOPHILS NFR BLD: 0.2 % (ref 0–1.9)
BILIRUB SERPL-MCNC: 0.6 MG/DL (ref 0.1–1)
BUN SERPL-MCNC: 4 MG/DL (ref 6–20)
CALCIUM SERPL-MCNC: 8.7 MG/DL (ref 8.7–10.5)
CHLORIDE SERPL-SCNC: 99 MMOL/L (ref 95–110)
CO2 SERPL-SCNC: 24 MMOL/L (ref 23–29)
CREAT SERPL-MCNC: 0.7 MG/DL (ref 0.5–1.4)
DIFFERENTIAL METHOD: ABNORMAL
EOSINOPHIL # BLD AUTO: 0.4 K/UL (ref 0–0.5)
EOSINOPHIL NFR BLD: 4 % (ref 0–8)
ERYTHROCYTE [DISTWIDTH] IN BLOOD BY AUTOMATED COUNT: 13.4 % (ref 11.5–14.5)
EST. GFR  (AFRICAN AMERICAN): >60 ML/MIN/1.73 M^2
EST. GFR  (NON AFRICAN AMERICAN): >60 ML/MIN/1.73 M^2
GLUCOSE SERPL-MCNC: 126 MG/DL (ref 70–110)
HCT VFR BLD AUTO: 36.8 % (ref 40–54)
HGB BLD-MCNC: 11.9 G/DL (ref 14–18)
IMM GRANULOCYTES # BLD AUTO: 0.03 K/UL (ref 0–0.04)
IMM GRANULOCYTES NFR BLD AUTO: 0.3 % (ref 0–0.5)
LYMPHOCYTES # BLD AUTO: 1.2 K/UL (ref 1–4.8)
LYMPHOCYTES NFR BLD: 10.9 % (ref 18–48)
MAGNESIUM SERPL-MCNC: 1.6 MG/DL (ref 1.6–2.6)
MCH RBC QN AUTO: 29 PG (ref 27–31)
MCHC RBC AUTO-ENTMCNC: 32.3 G/DL (ref 32–36)
MCV RBC AUTO: 90 FL (ref 82–98)
MONOCYTES # BLD AUTO: 0.6 K/UL (ref 0.3–1)
MONOCYTES NFR BLD: 5.6 % (ref 4–15)
NEUTROPHILS # BLD AUTO: 8.4 K/UL (ref 1.8–7.7)
NEUTROPHILS NFR BLD: 79 % (ref 38–73)
NRBC BLD-RTO: 0 /100 WBC
PLATELET # BLD AUTO: 324 K/UL (ref 150–350)
PMV BLD AUTO: 9.3 FL (ref 9.2–12.9)
POTASSIUM SERPL-SCNC: 3.4 MMOL/L (ref 3.5–5.1)
PROT SERPL-MCNC: 6.6 G/DL (ref 6–8.4)
RBC # BLD AUTO: 4.11 M/UL (ref 4.6–6.2)
RPR SER QL: NORMAL
SODIUM SERPL-SCNC: 136 MMOL/L (ref 136–145)
WBC # BLD AUTO: 10.64 K/UL (ref 3.9–12.7)

## 2020-02-04 PROCEDURE — 99223 PR INITIAL HOSPITAL CARE,LEVL III: ICD-10-PCS | Mod: ,,, | Performed by: HOSPITALIST

## 2020-02-04 PROCEDURE — 25000003 PHARM REV CODE 250: Performed by: STUDENT IN AN ORGANIZED HEALTH CARE EDUCATION/TRAINING PROGRAM

## 2020-02-04 PROCEDURE — 80053 COMPREHEN METABOLIC PANEL: CPT

## 2020-02-04 PROCEDURE — 63600175 PHARM REV CODE 636 W HCPCS: Performed by: STUDENT IN AN ORGANIZED HEALTH CARE EDUCATION/TRAINING PROGRAM

## 2020-02-04 PROCEDURE — 85025 COMPLETE CBC W/AUTO DIFF WBC: CPT

## 2020-02-04 PROCEDURE — 36415 COLL VENOUS BLD VENIPUNCTURE: CPT

## 2020-02-04 PROCEDURE — 11000001 HC ACUTE MED/SURG PRIVATE ROOM

## 2020-02-04 PROCEDURE — 83735 ASSAY OF MAGNESIUM: CPT

## 2020-02-04 PROCEDURE — 99223 1ST HOSP IP/OBS HIGH 75: CPT | Mod: ,,, | Performed by: HOSPITALIST

## 2020-02-04 RX ORDER — POTASSIUM CHLORIDE 20 MEQ/1
40 TABLET, EXTENDED RELEASE ORAL ONCE
Status: DISCONTINUED | OUTPATIENT
Start: 2020-02-04 | End: 2020-02-04

## 2020-02-04 RX ORDER — METHOCARBAMOL 500 MG/1
500 TABLET, FILM COATED ORAL 4 TIMES DAILY
Status: DISCONTINUED | OUTPATIENT
Start: 2020-02-04 | End: 2020-02-04

## 2020-02-04 RX ORDER — TALC
9 POWDER (GRAM) TOPICAL NIGHTLY
Status: DISCONTINUED | OUTPATIENT
Start: 2020-02-04 | End: 2020-02-07 | Stop reason: HOSPADM

## 2020-02-04 RX ORDER — POTASSIUM CHLORIDE 20 MEQ/15ML
40 SOLUTION ORAL ONCE
Status: COMPLETED | OUTPATIENT
Start: 2020-02-04 | End: 2020-02-04

## 2020-02-04 RX ORDER — AMOXICILLIN 250 MG
1 CAPSULE ORAL 2 TIMES DAILY
Status: DISCONTINUED | OUTPATIENT
Start: 2020-02-04 | End: 2020-02-07 | Stop reason: HOSPADM

## 2020-02-04 RX ORDER — MAGNESIUM SULFATE HEPTAHYDRATE 40 MG/ML
2 INJECTION, SOLUTION INTRAVENOUS
Status: DISPENSED | OUTPATIENT
Start: 2020-02-04 | End: 2020-02-04

## 2020-02-04 RX ORDER — OXYCODONE HYDROCHLORIDE 5 MG/1
5 TABLET ORAL EVERY 4 HOURS PRN
Status: DISCONTINUED | OUTPATIENT
Start: 2020-02-04 | End: 2020-02-06

## 2020-02-04 RX ORDER — METHOCARBAMOL 500 MG/1
500 TABLET, FILM COATED ORAL 4 TIMES DAILY
Status: DISCONTINUED | OUTPATIENT
Start: 2020-02-04 | End: 2020-02-07 | Stop reason: HOSPADM

## 2020-02-04 RX ADMIN — METHOCARBAMOL TABLETS 500 MG: 500 TABLET, COATED ORAL at 08:02

## 2020-02-04 RX ADMIN — METHOCARBAMOL TABLETS 500 MG: 500 TABLET, COATED ORAL at 10:02

## 2020-02-04 RX ADMIN — METHOCARBAMOL TABLETS 500 MG: 500 TABLET, COATED ORAL at 12:02

## 2020-02-04 RX ADMIN — HYDROMORPHONE HYDROCHLORIDE 1 MG: 1 INJECTION, SOLUTION INTRAMUSCULAR; INTRAVENOUS; SUBCUTANEOUS at 05:02

## 2020-02-04 RX ADMIN — MAGNESIUM SULFATE IN WATER 2 G: 40 INJECTION, SOLUTION INTRAVENOUS at 05:02

## 2020-02-04 RX ADMIN — DOCUSATE SODIUM - SENNOSIDES 1 TABLET: 50; 8.6 TABLET, FILM COATED ORAL at 08:02

## 2020-02-04 RX ADMIN — METHOCARBAMOL TABLETS 500 MG: 500 TABLET, COATED ORAL at 05:02

## 2020-02-04 RX ADMIN — OXYCODONE HYDROCHLORIDE 5 MG: 5 TABLET ORAL at 12:02

## 2020-02-04 RX ADMIN — SENNOSIDES AND DOCUSATE SODIUM 1 TABLET: 8.6; 5 TABLET ORAL at 08:02

## 2020-02-04 RX ADMIN — Medication 9 MG: at 08:02

## 2020-02-04 RX ADMIN — OXYCODONE HYDROCHLORIDE 5 MG: 5 TABLET ORAL at 06:02

## 2020-02-04 RX ADMIN — OXYCODONE HYDROCHLORIDE 5 MG: 5 TABLET ORAL at 08:02

## 2020-02-04 RX ADMIN — CEFTRIAXONE SODIUM 1 G: 1 INJECTION, POWDER, FOR SOLUTION INTRAMUSCULAR; INTRAVENOUS at 02:02

## 2020-02-04 RX ADMIN — HYDROMORPHONE HYDROCHLORIDE 1 MG: 1 INJECTION, SOLUTION INTRAMUSCULAR; INTRAVENOUS; SUBCUTANEOUS at 09:02

## 2020-02-04 RX ADMIN — HYDROMORPHONE HYDROCHLORIDE 1 MG: 1 INJECTION, SOLUTION INTRAMUSCULAR; INTRAVENOUS; SUBCUTANEOUS at 10:02

## 2020-02-04 RX ADMIN — POTASSIUM CHLORIDE 40 MEQ: 20 SOLUTION ORAL at 10:02

## 2020-02-04 RX ADMIN — OXYCODONE HYDROCHLORIDE 5 MG: 5 TABLET ORAL at 02:02

## 2020-02-04 RX ADMIN — POLYETHYLENE GLYCOL 3350 17 G: 17 POWDER, FOR SOLUTION ORAL at 08:02

## 2020-02-04 NOTE — ASSESSMENT & PLAN NOTE
- patient with significant pain from nephrostomy tube site  - continue oxycodone and dilaudid IV for now  - added robaxin   - will attempt to wean in next 24 hours  - OOB encouraged  - PT/OT

## 2020-02-04 NOTE — PROGRESS NOTES
[facility]  Urology  Progress Note    Patient Name: Fernie Rice  MRN: [id number]  Admission Date: 2/2/2020  Hospital Length of Stay: 2 days  Code Status: Full Code   Attending Provider: Destiny Connelly MD   Primary Care Physician: Fernie Garcia MD    Subjective:     HPI:  19 y.o. male with a history of asthma and ADHD who presents due to right flank pain coupled with subjective fevers and shaking chills. Patient states that he has had a 2 week history of intermittent dysuria for which he went to an urgent care on 1/22. No UA or urine culture was ordered but GC/chlamydia negative. In addition, he has had sharp intermittent right flank pain for the past two weeks. Beginning last night, he began to experience subjective fevers coupled with shaking chills. He has had nausea and vomiting today. Patient denies gross hematuria, frequency, urgency, suprapubic pain. No history of UTI's. In the ED, he is febrile to 103 and tachycardic to 141. His lactate on presentation was 2.7. His vitals and LA improved s/p 2.2 L of normal saline boluses. Clean catch UA was nitrite positive and showed 5 RBC/hpf, >100 WBC/hpf, and many bacteria. 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 He has never seen a urologist and was unaware of his duplicated system.    Interval History: Continues to spike fevers and has low grade tachycardia. White count downtrending. UOP excellent and neph tubes draining well. Endorsing pain at nephrostomy insertion site.    Objective:     Temp:  [97.1 °F (36.2 °C)-101 °F (38.3 °C)] 99.1 °F (37.3 °C)  Pulse:  [] 109  Resp:  [16-18] 18  SpO2:  [93 %-98 %] 94 %  BP: (113-140)/(56-88) 125/56     Body mass index is 27.46 kg/m².           Drains     Drain                  Nephrostomy 02/02/20 2252 Right 8 Fr. less than 1 day         Nephrostomy 02/02/20 2252 Right 8 Fr. less than 1 day                Physical Exam   Nursing note and vitals reviewed.  Constitutional: He is oriented to person, place, and time. He appears well-developed and well-nourished. No distress.   HENT:   Head: Normocephalic and atraumatic.   Eyes: Pupils are equal, round, and reactive to light. Right eye exhibits no discharge. Left eye exhibits no discharge.   Cardiovascular: Normal rate.    Pulmonary/Chest: Effort normal. No stridor. No respiratory distress.   Abdominal: Soft. He exhibits no distension. There is no tenderness.   Right CVA tenderness    Right nephrostomy tubes x2 draining clear yellow urine   Neurological: He is alert and oriented to person, place, and time.   Skin: Skin is warm and dry.     Psychiatric: He has a normal mood and affect. His behavior is normal. Judgment and thought content normal.       Significant Labs:    BMP:  Recent Labs   Lab 02/02/20  1428 02/03/20  0633    136   K 4.0 3.7   CL 99 104   CO2 25 22*   BUN 12 6   CREATININE 1.0 0.7   CALCIUM 9.9 8.0*       CBC:   Recent Labs   Lab 02/02/20  1428 02/03/20  0633 02/04/20  0506   WBC 14.55* 13.66* 10.64   HGB 14.3 11.5* 11.9*   HCT 43.1 35.8* 36.8*   * 282 324       Assessment/Plan:     * Pyelonephritis  19 y.o. male with a history of asthma and ADHD who presents due to right-sided pyelonephritis and concern for ureteral obstruction in the setting of a complete right-sided duplicated system.    - Continue drainage of duplicated system with upper and lower pole nephrostomy tubes.  - Continue empiric Rocephin. Follow-up cultures and tailor abx to susceptibilities.  - Will arrange for outpatient ureterocele puncture while on antibiotics.  - Continue care per primary        VTE Risk Mitigation (From admission, onward)         Ordered     Place sequential compression device  Until discontinued      02/03/20 0001     IP  VTE LOW RISK PATIENT  Once      02/03/20 0001                Fernie Macedo MD  Urology  [facility]

## 2020-02-04 NOTE — SUBJECTIVE & OBJECTIVE
Interval History: Continues to spike fevers and has low grade tachycardia. White count downtrending. UOP excellent and neph tubes draining well. Endorsing pain at nephrostomy insertion site.    Objective:     Temp:  [97.1 °F (36.2 °C)-101 °F (38.3 °C)] 99.1 °F (37.3 °C)  Pulse:  [] 109  Resp:  [16-18] 18  SpO2:  [93 %-98 %] 94 %  BP: (113-140)/(56-88) 125/56     Body mass index is 27.46 kg/m².           Drains     Drain                 Nephrostomy 02/02/20 2252 Right 8 Fr. less than 1 day         Nephrostomy 02/02/20 2252 Right 8 Fr. less than 1 day                Physical Exam   Nursing note and vitals reviewed.  Constitutional: He is oriented to person, place, and time. He appears well-developed and well-nourished. No distress.   HENT:   Head: Normocephalic and atraumatic.   Eyes: Pupils are equal, round, and reactive to light. Right eye exhibits no discharge. Left eye exhibits no discharge.   Cardiovascular: Normal rate.    Pulmonary/Chest: Effort normal. No stridor. No respiratory distress.   Abdominal: Soft. He exhibits no distension. There is no tenderness.   Right CVA tenderness    Right nephrostomy tubes x2 draining clear yellow urine   Neurological: He is alert and oriented to person, place, and time.   Skin: Skin is warm and dry.     Psychiatric: He has a normal mood and affect. His behavior is normal. Judgment and thought content normal.       Significant Labs:    BMP:  Recent Labs   Lab 02/02/20  1428 02/03/20  0633    136   K 4.0 3.7   CL 99 104   CO2 25 22*   BUN 12 6   CREATININE 1.0 0.7   CALCIUM 9.9 8.0*       CBC:   Recent Labs   Lab 02/02/20  1428 02/03/20  0633 02/04/20  0506   WBC 14.55* 13.66* 10.64   HGB 14.3 11.5* 11.9*   HCT 43.1 35.8* 36.8*   * 282 324

## 2020-02-04 NOTE — CARE UPDATE
Rapid Response Nurse Chart Check     Chart check completed, abnormal VS noted. Please call [phone] for further concerns or assistance.

## 2020-02-04 NOTE — PHYSICIAN QUERY
PT Name: Fernie Rice  MR #: [id number]     PHYSICIAN QUERY -  ACUITY OF CONDITION CLARIFICATION      CDS/: Lili Mcgee RN                 Contact information:[email or web address]  This form is a permanent document in the medical record.     Query Date: February 4, 2020    By submitting this query, we are merely seeking further clarification of documentation to reflect the severity of illness of your patient. Please utilize your independent clinical judgment when addressing the question(s) below.    The Medical record reflects the following:     Indicators   Supporting Clinical Findings Location in Medical Record    Documentation of condition Pyelonephritis  19 y.o. male with a history of asthma and ADHD who presents due to right-sided pyelonephritis and concern for ureteral obstruction in the setting of a complete right-sided duplicated system    Pyelonephritis  Patient with complicated right duplicate ureteral system with hydronephrosis and now pyelonephritis requiring nephrostomy tube placement. Urology following. Patient s/p R nephrostomy tubes to R upper and lower poles on 2/3. Patient with continued tachycardia, but very clear source of infection with UA and hydronephrosis. Micro studies of urine directly from R kidney are pending. Blood cultures pending. Patient empirically started on ceftriaxone. Will try to get his pain under better control as well, as that could also be contributing to tachycardia. His BP is stable in the 130s systolic, currently appears stable for the floor, but low threshold to consult ICU if hypotensive. Urology PN 2/4            Hosp Med PN 2/4    Lab Value(s)      Radiology Findings      Treatment                                 Medication Continue drainage of duplicated system with upper and lower pole nephrostomy tubes.  - Continue empiric Rocephin. Follow-up cultures and tailor abx to susceptibilities.  - Will arrange for outpatient ureterocele puncture while on  antibiotics.  - Continue care per primary Urology PN 2/4    Other       Provider, please specify the acuity/chronicity of __pyelonephritis__:    [X   ] Acute   [   ]  Clinically Undetermined       Please document in your progress notes daily for the duration of treatment until resolved, and include in your discharge summary.

## 2020-02-04 NOTE — SUBJECTIVE & OBJECTIVE
Interval history:  Patient complains of pain at neph tube insertion site. Increased oxycodone and added on robaxin today. Encouraged patient to ambulate to assist with pain control and recovery. He denies any chronic opiate use. Urology continues to follow. No culture data to assist with treatment but WBC improving.     Review of Systems   Constitutional: Positive for fever. Negative for chills.   HENT: Negative for hearing loss, sinus pain, sneezing and sore throat.    Eyes: Negative for photophobia and visual disturbance.   Respiratory: Negative for cough, chest tightness, shortness of breath and wheezing.    Cardiovascular: Negative for chest pain, palpitations and leg swelling.   Gastrointestinal: Positive for abdominal pain (right sided). Negative for constipation, diarrhea, nausea and vomiting.   Endocrine: Negative for polydipsia, polyphagia and polyuria.   Genitourinary: Negative for dysuria, hematuria and urgency.   Musculoskeletal: Positive for back pain (at neph tube insertion site). Negative for arthralgias.   Skin: Negative for rash.   Allergic/Immunologic: Negative for immunocompromised state.   Neurological: Negative for dizziness and headaches.   Hematological: Negative for adenopathy.   Psychiatric/Behavioral: Negative for agitation and behavioral problems.     Objective:     Vital Signs (Most Recent):  Temp: 99.1 °F (37.3 °C) (02/04/20 0500)  Pulse: 109 (02/04/20 0500)  Resp: 18 (02/04/20 0500)  BP: (!) 125/56 (02/04/20 0500)  SpO2: (!) 94 % (02/04/20 0500) Vital Signs (24h Range):  Temp:  [97.1 °F (36.2 °C)-101 °F (38.3 °C)] 99.1 °F (37.3 °C)  Pulse:  [] 109  Resp:  [16-18] 18  SpO2:  [93 %-98 %] 94 %  BP: (113-140)/(56-88) 125/56     Weight: 72.6 kg (160 lb)  Body mass index is 27.46 kg/m².    Physical Exam   Constitutional: He is oriented to person, place, and time. He appears well-developed and well-nourished. He appears distressed (due to pain).   HENT:   Head: Atraumatic.   Eyes: Right  eye exhibits no discharge. Left eye exhibits no discharge. No scleral icterus.   Neck: Neck supple. No JVD present.   Cardiovascular: Regular rhythm. Exam reveals no gallop and no friction rub.   No murmur heard.  Tachycardic   Pulmonary/Chest: Effort normal and breath sounds normal. No respiratory distress. He has no wheezes. He has no rales.   Abdominal: Soft. He exhibits no distension. There is tenderness (right sided tenderness. neph tube site on right flank clean dry and intact).   Musculoskeletal: He exhibits no edema or tenderness.   Neurological: He is alert and oriented to person, place, and time. No cranial nerve deficit or sensory deficit.   Skin: Skin is dry.   Psychiatric: He has a normal mood and affect.           Significant Labs:   Blood Culture:   Recent Labs   Lab 02/02/20  1428   LABBLOO No Growth to date  No Growth to date  No Growth to date  No Growth to date     CBC:   Recent Labs   Lab 02/02/20 1428 02/03/20  0633 02/04/20  0506   WBC 14.55* 13.66* 10.64   HGB 14.3 11.5* 11.9*   HCT 43.1 35.8* 36.8*   * 282 324     CMP:   Recent Labs   Lab 02/02/20 1428 02/03/20  0633 02/04/20  0506    136 136   K 4.0 3.7 3.4*   CL 99 104 99   CO2 25 22* 24   * 91 126*   BUN 12 6 4*   CREATININE 1.0 0.7 0.7   CALCIUM 9.9 8.0* 8.7   PROT 8.3 6.2 6.6   ALBUMIN 3.5 2.4* 2.5*   BILITOT 1.6* 1.1* 0.6   ALKPHOS 103 75 75   AST 14 26 23   ALT 21 16 17   ANIONGAP 13 10 13   EGFRNONAA >60.0 >60.0 >60.0     Lactic Acid:   Recent Labs   Lab 02/02/20  1428 02/02/20  1840   LACTATE 2.7* 0.9     Troponin:   Recent Labs   Lab 02/02/20  1428   TROPONINI <0.006     Urine Culture:   Recent Labs   Lab 02/02/20  1442   LABURIN No growth     Urine Studies:   Recent Labs   Lab 02/02/20  1442   COLORU Franci   APPEARANCEUA Cloudy*   PHUR 6.0   SPECGRAV 1.015   PROTEINUA 2+*   GLUCUA Negative   KETONESU Negative   BILIRUBINUA Negative   OCCULTUA 2+*   NITRITE Positive*   LEUKOCYTESUR 3+*   RBCUA 5*   WBCUA  >100*   BACTERIA Many*   HYALINECASTS 16*       Significant Imaging: I have reviewed all pertinent imaging results/findings within the past 24 hours.

## 2020-02-04 NOTE — NURSING
Pt has 101 temp and is tachy dr esparza with med 3 stated will do blood cultures prn tylenol given

## 2020-02-04 NOTE — ASSESSMENT & PLAN NOTE
19 y.o. male with a history of asthma and ADHD who presents due to right-sided pyelonephritis and concern for ureteral obstruction in the setting of a complete right-sided duplicated system.    - Continue drainage of duplicated system with upper and lower pole nephrostomy tubes.  - Continue empiric Rocephin. Follow-up cultures and tailor abx to susceptibilities.  - Will arrange for outpatient ureterocele puncture while on antibiotics.  - Continue care per primary

## 2020-02-04 NOTE — PROGRESS NOTES
[facility] Hospital Medicine  Progress Note    Patient Name: Fernie Rice  MRN: [id number]  Patient Class: IP- Inpatient   Admission Date: 2/2/2020  Length of Stay: 2 days  Attending Physician: Destiny Connelly MD  Primary Care Provider: Fernie Garcia MD    [facility] Medicine Team: [facility] HOSP MED 3 Nelson Holman MD    Subjective:     Principal Problem:Pyelonephritis        HPI:  Patient is a 19 M with PMH asthma, eczema (takes dupilumab), recurrent staph infections 2/2 itching his eczema on his arms, pneumonia 2/2 requiring a prior admission to the hospital, and ADHD (per the chart) who presented to [facility] ED for right sided abdominal pain and urinary burning.    Patient states that he had trauma to his right hand requiring screw placement by ortho about 1 month ago. About 2 weeks ago, he started noticing progressively worsening abdominal pain. He then started to have urinary burning and the abdominal pain became unbearable. He came to the ED on 2/2 for evaluation. He was febrile to 103 and tachycardic to the 180s (EKG sinus tach), SBP in 100s-110s. Septic workup initiated with UA, CXR, labs and lactic. White count elevated to 14, lactic 2.7, creatinine 1 (up from 0.7 at baseline), electrolytes normal, UA showing nitrites, >100 WBCs, and many bacteria, and CT A/P with contrast that showed duplicated R ureteral system with hydronephrosis. He was given 2L NS and ceftriaxone for complicated UTI in the ED and urology consulted. Urology recommended IR nephrostomy tube placement to R upper and lower poles. He went to IR and had neph tubes placed without complication.     He is admitted to [facility] for further evaluation for complicated UTI. Urology following along.    Overview/Hospital Course:  Patient with duplicated ureteral system here for complicated UTI requiring placement of 2 R nephrostomy tubes admitted to 3. Continuing ceftriaxone, all cultures remain no growth to date. Tachycardia, febrile periods  and pain continues at nephrostomy tube site. Urology following and will plan for ureterocele puncture when OP. Added robaxin to assist with pain control.     Interval history:  Patient complains of pain at neph tube insertion site. Increased oxycodone and added on robaxin today. Encouraged patient to ambulate to assist with pain control and recovery. He denies any chronic opiate use. Urology continues to follow. No culture data to assist with treatment but WBC improving.     Review of Systems   Constitutional: Positive for fever. Negative for chills.   HENT: Negative for hearing loss, sinus pain, sneezing and sore throat.    Eyes: Negative for photophobia and visual disturbance.   Respiratory: Negative for cough, chest tightness, shortness of breath and wheezing.    Cardiovascular: Negative for chest pain, palpitations and leg swelling.   Gastrointestinal: Positive for abdominal pain (right sided). Negative for constipation, diarrhea, nausea and vomiting.   Endocrine: Negative for polydipsia, polyphagia and polyuria.   Genitourinary: Negative for dysuria, hematuria and urgency.   Musculoskeletal: Positive for back pain (at neph tube insertion site). Negative for arthralgias.   Skin: Negative for rash.   Allergic/Immunologic: Negative for immunocompromised state.   Neurological: Negative for dizziness and headaches.   Hematological: Negative for adenopathy.   Psychiatric/Behavioral: Negative for agitation and behavioral problems.     Objective:     Vital Signs (Most Recent):  Temp: 99.1 °F (37.3 °C) (02/04/20 0500)  Pulse: 109 (02/04/20 0500)  Resp: 18 (02/04/20 0500)  BP: (!) 125/56 (02/04/20 0500)  SpO2: (!) 94 % (02/04/20 0500) Vital Signs (24h Range):  Temp:  [97.1 °F (36.2 °C)-101 °F (38.3 °C)] 99.1 °F (37.3 °C)  Pulse:  [] 109  Resp:  [16-18] 18  SpO2:  [93 %-98 %] 94 %  BP: (113-140)/(56-88) 125/56     Weight: 72.6 kg (160 lb)  Body mass index is 27.46 kg/m².    Physical Exam   Constitutional: He is  oriented to person, place, and time. He appears well-developed and well-nourished. He appears distressed (due to pain).   HENT:   Head: Atraumatic.   Eyes: Right eye exhibits no discharge. Left eye exhibits no discharge. No scleral icterus.   Neck: Neck supple. No JVD present.   Cardiovascular: Regular rhythm. Exam reveals no gallop and no friction rub.   No murmur heard.  Tachycardic   Pulmonary/Chest: Effort normal and breath sounds normal. No respiratory distress. He has no wheezes. He has no rales.   Abdominal: Soft. He exhibits no distension. There is tenderness (right sided tenderness. neph tube site on right flank clean dry and intact).   Musculoskeletal: He exhibits no edema or tenderness.   Neurological: He is alert and oriented to person, place, and time. No cranial nerve deficit or sensory deficit.   Skin: Skin is dry.   Psychiatric: He has a normal mood and affect.           Significant Labs:   Blood Culture:   Recent Labs   Lab 02/02/20  1428   LABBLOO No Growth to date  No Growth to date  No Growth to date  No Growth to date     CBC:   Recent Labs   Lab 02/02/20 1428 02/03/20  0633 02/04/20  0506   WBC 14.55* 13.66* 10.64   HGB 14.3 11.5* 11.9*   HCT 43.1 35.8* 36.8*   * 282 324     CMP:   Recent Labs   Lab 02/02/20 1428 02/03/20  0633 02/04/20  0506    136 136   K 4.0 3.7 3.4*   CL 99 104 99   CO2 25 22* 24   * 91 126*   BUN 12 6 4*   CREATININE 1.0 0.7 0.7   CALCIUM 9.9 8.0* 8.7   PROT 8.3 6.2 6.6   ALBUMIN 3.5 2.4* 2.5*   BILITOT 1.6* 1.1* 0.6   ALKPHOS 103 75 75   AST 14 26 23   ALT 21 16 17   ANIONGAP 13 10 13   EGFRNONAA >60.0 >60.0 >60.0     Lactic Acid:   Recent Labs   Lab 02/02/20 1428 02/02/20  1840   LACTATE 2.7* 0.9     Troponin:   Recent Labs   Lab 02/02/20 1428   TROPONINI <0.006     Urine Culture:   Recent Labs   Lab 02/02/20  1442   LABURIN No growth     Urine Studies:   Recent Labs   Lab 02/02/20  1442   COLORU Franci   APPEARANCEUA Cloudy*   PHUR 6.0    SPECGRAV 1.015   PROTEINUA 2+*   GLUCUA Negative   KETONESU Negative   BILIRUBINUA Negative   OCCULTUA 2+*   NITRITE Positive*   LEUKOCYTESUR 3+*   RBCUA 5*   WBCUA >100*   BACTERIA Many*   HYALINECASTS 16*       Significant Imaging: I have reviewed all pertinent imaging results/findings within the past 24 hours.      Assessment/Plan:      * Pyelonephritis  Patient with complicated right duplicate ureteral system with hydronephrosis and now pyelonephritis requiring nephrostomy tube placement. Urology following. Patient s/p R nephrostomy tubes to R upper and lower poles on 2/3. Patient with continued tachycardia, but very clear source of infection with UA and hydronephrosis. Micro studies of urine directly from R kidney are pending. Blood cultures pending. Patient empirically started on ceftriaxone. Will try to get his pain under better control as well, as that could also be contributing to tachycardia. His BP is stable in the 130s systolic, currently appears stable for the floor, but low threshold to consult ICU if hypotensive.    PLAN:  - continue ceftriaxone   - f/u urine culture and sensitivities  - f/u blood cultures  - f/u urology recs  - pain control  - monitor creatinine      Nephrostomy status  - patient with significant pain from nephrostomy tube site  - continue oxycodone and dilaudid IV for now  - added robaxin   - will attempt to wean in next 24 hours  - OOB encouraged  - PT/OT     Duplicated ureter, right  See pyelonephritis      Discharge planning issues  Given history of eczema, allergies, prior staph infections, pneumonia (although patient attributes that to vaping), and now duplicated ureter with pyelonephritis, may consider outpatient referral to immunologist for congenital immunodeficiency workup.    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

## 2020-02-05 PROBLEM — A41.9 SEPSIS: Status: RESOLVED | Noted: 2020-02-02 | Resolved: 2020-02-05

## 2020-02-05 LAB
ALBUMIN SERPL BCP-MCNC: 2.4 G/DL (ref 3.5–5.2)
ALP SERPL-CCNC: 65 U/L (ref 55–135)
ALT SERPL W/O P-5'-P-CCNC: 16 U/L (ref 10–44)
ANION GAP SERPL CALC-SCNC: 9 MMOL/L (ref 8–16)
AST SERPL-CCNC: 17 U/L (ref 10–40)
BASOPHILS # BLD AUTO: 0.03 K/UL (ref 0–0.2)
BASOPHILS NFR BLD: 0.4 % (ref 0–1.9)
BILIRUB SERPL-MCNC: 0.5 MG/DL (ref 0.1–1)
BUN SERPL-MCNC: 5 MG/DL (ref 6–20)
CALCIUM SERPL-MCNC: 9.1 MG/DL (ref 8.7–10.5)
CHLORIDE SERPL-SCNC: 101 MMOL/L (ref 95–110)
CO2 SERPL-SCNC: 28 MMOL/L (ref 23–29)
CREAT SERPL-MCNC: 0.7 MG/DL (ref 0.5–1.4)
DIFFERENTIAL METHOD: ABNORMAL
EOSINOPHIL # BLD AUTO: 0.9 K/UL (ref 0–0.5)
EOSINOPHIL NFR BLD: 11.9 % (ref 0–8)
ERYTHROCYTE [DISTWIDTH] IN BLOOD BY AUTOMATED COUNT: 13.2 % (ref 11.5–14.5)
EST. GFR  (AFRICAN AMERICAN): >60 ML/MIN/1.73 M^2
EST. GFR  (NON AFRICAN AMERICAN): >60 ML/MIN/1.73 M^2
GLUCOSE SERPL-MCNC: 101 MG/DL (ref 70–110)
HCT VFR BLD AUTO: 35.4 % (ref 40–54)
HGB BLD-MCNC: 11.5 G/DL (ref 14–18)
IMM GRANULOCYTES # BLD AUTO: 0.02 K/UL (ref 0–0.04)
IMM GRANULOCYTES NFR BLD AUTO: 0.3 % (ref 0–0.5)
LYMPHOCYTES # BLD AUTO: 1.5 K/UL (ref 1–4.8)
LYMPHOCYTES NFR BLD: 20.2 % (ref 18–48)
MAGNESIUM SERPL-MCNC: 1.9 MG/DL (ref 1.6–2.6)
MCH RBC QN AUTO: 29.3 PG (ref 27–31)
MCHC RBC AUTO-ENTMCNC: 32.5 G/DL (ref 32–36)
MCV RBC AUTO: 90 FL (ref 82–98)
MONOCYTES # BLD AUTO: 0.5 K/UL (ref 0.3–1)
MONOCYTES NFR BLD: 6.7 % (ref 4–15)
NEUTROPHILS # BLD AUTO: 4.6 K/UL (ref 1.8–7.7)
NEUTROPHILS NFR BLD: 60.5 % (ref 38–73)
NRBC BLD-RTO: 0 /100 WBC
PLATELET # BLD AUTO: 340 K/UL (ref 150–350)
PMV BLD AUTO: 9 FL (ref 9.2–12.9)
POTASSIUM SERPL-SCNC: 3.7 MMOL/L (ref 3.5–5.1)
PROT SERPL-MCNC: 6.7 G/DL (ref 6–8.4)
RBC # BLD AUTO: 3.93 M/UL (ref 4.6–6.2)
SODIUM SERPL-SCNC: 138 MMOL/L (ref 136–145)
WBC # BLD AUTO: 7.64 K/UL (ref 3.9–12.7)

## 2020-02-05 PROCEDURE — 25000003 PHARM REV CODE 250: Performed by: STUDENT IN AN ORGANIZED HEALTH CARE EDUCATION/TRAINING PROGRAM

## 2020-02-05 PROCEDURE — 36415 COLL VENOUS BLD VENIPUNCTURE: CPT

## 2020-02-05 PROCEDURE — 85025 COMPLETE CBC W/AUTO DIFF WBC: CPT

## 2020-02-05 PROCEDURE — 11000001 HC ACUTE MED/SURG PRIVATE ROOM

## 2020-02-05 PROCEDURE — 63600175 PHARM REV CODE 636 W HCPCS: Performed by: STUDENT IN AN ORGANIZED HEALTH CARE EDUCATION/TRAINING PROGRAM

## 2020-02-05 PROCEDURE — 99233 SBSQ HOSP IP/OBS HIGH 50: CPT | Mod: ,,, | Performed by: HOSPITALIST

## 2020-02-05 PROCEDURE — 97530 THERAPEUTIC ACTIVITIES: CPT

## 2020-02-05 PROCEDURE — 83735 ASSAY OF MAGNESIUM: CPT

## 2020-02-05 PROCEDURE — 80053 COMPREHEN METABOLIC PANEL: CPT

## 2020-02-05 PROCEDURE — 99233 PR SUBSEQUENT HOSPITAL CARE,LEVL III: ICD-10-PCS | Mod: ,,, | Performed by: HOSPITALIST

## 2020-02-05 RX ORDER — DUPILUMAB 300 MG/2ML
INJECTION, SOLUTION SUBCUTANEOUS
COMMUNITY

## 2020-02-05 RX ORDER — CEFDINIR 125 MG/5ML
300 POWDER, FOR SUSPENSION ORAL EVERY 12 HOURS
Status: DISCONTINUED | OUTPATIENT
Start: 2020-02-05 | End: 2020-02-07 | Stop reason: HOSPADM

## 2020-02-05 RX ORDER — GABAPENTIN 300 MG/1
300 CAPSULE ORAL 3 TIMES DAILY
Status: DISCONTINUED | OUTPATIENT
Start: 2020-02-05 | End: 2020-02-07 | Stop reason: HOSPADM

## 2020-02-05 RX ORDER — IBUPROFEN 800 MG/1
800 TABLET ORAL 3 TIMES DAILY PRN
COMMUNITY

## 2020-02-05 RX ORDER — ACETAMINOPHEN 325 MG/1
650 TABLET ORAL EVERY 6 HOURS
Status: DISCONTINUED | OUTPATIENT
Start: 2020-02-05 | End: 2020-02-07 | Stop reason: HOSPADM

## 2020-02-05 RX ADMIN — GABAPENTIN 300 MG: 300 CAPSULE ORAL at 10:02

## 2020-02-05 RX ADMIN — CEFDINIR 300 MG: 125 POWDER, FOR SUSPENSION ORAL at 09:02

## 2020-02-05 RX ADMIN — OXYCODONE HYDROCHLORIDE 5 MG: 5 TABLET ORAL at 05:02

## 2020-02-05 RX ADMIN — ACETAMINOPHEN 650 MG: 325 TABLET ORAL at 05:02

## 2020-02-05 RX ADMIN — METHOCARBAMOL TABLETS 500 MG: 500 TABLET, COATED ORAL at 09:02

## 2020-02-05 RX ADMIN — OXYCODONE HYDROCHLORIDE 5 MG: 5 TABLET ORAL at 09:02

## 2020-02-05 RX ADMIN — GABAPENTIN 300 MG: 300 CAPSULE ORAL at 04:02

## 2020-02-05 RX ADMIN — HYDROMORPHONE HYDROCHLORIDE 1 MG: 1 INJECTION, SOLUTION INTRAMUSCULAR; INTRAVENOUS; SUBCUTANEOUS at 11:02

## 2020-02-05 RX ADMIN — HYDROMORPHONE HYDROCHLORIDE 1 MG: 1 INJECTION, SOLUTION INTRAMUSCULAR; INTRAVENOUS; SUBCUTANEOUS at 09:02

## 2020-02-05 RX ADMIN — DOCUSATE SODIUM - SENNOSIDES 1 TABLET: 50; 8.6 TABLET, FILM COATED ORAL at 09:02

## 2020-02-05 RX ADMIN — OXYCODONE HYDROCHLORIDE 5 MG: 5 TABLET ORAL at 01:02

## 2020-02-05 RX ADMIN — Medication 9 MG: at 09:02

## 2020-02-05 RX ADMIN — ACETAMINOPHEN 650 MG: 325 TABLET ORAL at 10:02

## 2020-02-05 RX ADMIN — HYDROMORPHONE HYDROCHLORIDE 1 MG: 1 INJECTION, SOLUTION INTRAMUSCULAR; INTRAVENOUS; SUBCUTANEOUS at 04:02

## 2020-02-05 RX ADMIN — METHOCARBAMOL TABLETS 500 MG: 500 TABLET, COATED ORAL at 05:02

## 2020-02-05 RX ADMIN — GABAPENTIN 300 MG: 300 CAPSULE ORAL at 09:02

## 2020-02-05 RX ADMIN — OXYCODONE HYDROCHLORIDE 5 MG: 5 TABLET ORAL at 07:02

## 2020-02-05 RX ADMIN — CEFDINIR 300 MG: 125 POWDER, FOR SUSPENSION ORAL at 11:02

## 2020-02-05 RX ADMIN — METHOCARBAMOL TABLETS 500 MG: 500 TABLET, COATED ORAL at 01:02

## 2020-02-05 NOTE — ASSESSMENT & PLAN NOTE
Patient with complicated right duplicate ureteral system with hydronephrosis and now pyelonephritis requiring nephrostomy tube placement. Urology following. Patient s/p R nephrostomy tubes to R upper and lower poles on 2/3. Patient with continued tachycardia, but very clear source of infection with UA and hydronephrosis. Micro studies of urine directly from R kidney are pending. Blood cultures pending. Patient empirically started on ceftriaxone. Will try to get his pain under better control as well, as that could also be contributing to tachycardia. His BP is stable in the 130s systolic, currently appears stable for the floor, but low threshold to consult ICU if hypotensive.    PLAN:  - abx de-escalated to cefdinir   - f/u urine culture and sensitivities - all NGTD  - f/u blood cultures - NGTD  - f/u urology recs - urology signed off   - pain control, titrating up on multimodal therapy - patient states in extreme pain, sleeping comfortably when I entered the room today, mother remains at bedside   - monitor creatinine

## 2020-02-05 NOTE — SUBJECTIVE & OBJECTIVE
Interval history:  Patient complains of pain at neph tube insertion site. Patient utilizing all PRN medications. Added on gabapentin and scheduled tylenol today. Encouraged patient to ambulate to assist with pain control and recovery. He denies any chronic opiate use. Abx de-escalated to cefdinir for 14 day course. All cultures negative. Patient will require aggressive weaning of pain medication prior to discharge. Examination not consistent with any operative issue related to neph tubes. Counts stable, HR now normalized, no further fevers.     Review of Systems   Constitutional: Negative for chills and fever.   HENT: Negative for hearing loss, sinus pain, sneezing and sore throat.    Eyes: Negative for photophobia and visual disturbance.   Respiratory: Negative for cough, chest tightness, shortness of breath and wheezing.    Cardiovascular: Negative for chest pain, palpitations and leg swelling.   Gastrointestinal: Negative for abdominal pain, constipation, diarrhea, nausea and vomiting.   Endocrine: Negative for polydipsia, polyphagia and polyuria.   Genitourinary: Negative for dysuria, hematuria and urgency.   Musculoskeletal: Positive for back pain (at neph tube insertion site). Negative for arthralgias.   Skin: Negative for rash.   Allergic/Immunologic: Negative for immunocompromised state.   Neurological: Negative for dizziness and headaches.   Hematological: Negative for adenopathy.   Psychiatric/Behavioral: Negative for agitation and behavioral problems.     Objective:     Vital Signs (Most Recent):  Temp: 97.6 °F (36.4 °C) (02/05/20 1142)  Pulse: 82 (02/05/20 1142)  Resp: 18 (02/05/20 1142)  BP: 122/69 (02/05/20 1142)  SpO2: 95 % (02/05/20 1142) Vital Signs (24h Range):  Temp:  [96.8 °F (36 °C)-98.3 °F (36.8 °C)] 97.6 °F (36.4 °C)  Pulse:  [] 82  Resp:  [17-20] 18  SpO2:  [92 %-100 %] 95 %  BP: (121-129)/(60-74) 122/69     Weight: 72.6 kg (160 lb)  Body mass index is 27.46 kg/m².    Physical Exam    Constitutional: He is oriented to person, place, and time. He appears well-developed and well-nourished. No distress.   Sleeping comfortably when I entered the room   HENT:   Head: Atraumatic.   Eyes: Right eye exhibits no discharge. Left eye exhibits no discharge. No scleral icterus.   Neck: Neck supple. No JVD present.   Cardiovascular: Normal rate and regular rhythm. Exam reveals no gallop and no friction rub.   No murmur heard.  Pulmonary/Chest: Effort normal and breath sounds normal. No respiratory distress. He has no wheezes. He has no rales.   Abdominal: Soft. He exhibits no distension. There is no tenderness (right sided tenderness. neph tube site on right flank clean dry and intact).   Musculoskeletal: He exhibits no edema or tenderness.   Neurological: He is alert and oriented to person, place, and time. No cranial nerve deficit or sensory deficit.   Skin: Skin is dry.   Psychiatric: He has a normal mood and affect.   Nursing note and vitals reviewed.          Significant Labs:   Blood Culture:   Recent Labs   Lab 02/03/20 1919 02/03/20 1920   LABBLOO No Growth to date  No Growth to date No Growth to date  No Growth to date     CBC:   Recent Labs   Lab 02/04/20  0506 02/05/20  0514   WBC 10.64 7.64   HGB 11.9* 11.5*   HCT 36.8* 35.4*    340     CMP:   Recent Labs   Lab 02/04/20  0506 02/05/20  0514    138   K 3.4* 3.7   CL 99 101   CO2 24 28   * 101   BUN 4* 5*   CREATININE 0.7 0.7   CALCIUM 8.7 9.1   PROT 6.6 6.7   ALBUMIN 2.5* 2.4*   BILITOT 0.6 0.5   ALKPHOS 75 65   AST 23 17   ALT 17 16   ANIONGAP 13 9   EGFRNONAA >60.0 >60.0     Lactic Acid:   No results for input(s): LACTATE in the last 48 hours.  Troponin:   No results for input(s): TROPONINI in the last 48 hours.  Urine Culture:   No results for input(s): LABURIN in the last 48 hours.  Urine Studies:   No results for input(s): COLORU, APPEARANCEUA, PHUR, SPECGRAV, PROTEINUA, GLUCUA, KETONESU, BILIRUBINUA, OCCULTUA,  NITRITE, UROBILINOGEN, LEUKOCYTESUR, RBCUA, WBCUA, BACTERIA, SQUAMEPITHEL, HYALINECASTS in the last 48 hours.    Invalid input(s): YAJAIRA    Significant Imaging: I have reviewed all pertinent imaging results/findings within the past 24 hours.

## 2020-02-05 NOTE — PROGRESS NOTES
[facility] Hospital Medicine  Progress Note    Patient Name: Fernie Rice  MRN: [id number]  Patient Class: IP- Inpatient   Admission Date: 2/2/2020  Length of Stay: 3 days  Attending Physician: Destiny Connelly MD  Primary Care Provider: Fernie Garcia MD    [facility] Medicine Team: [facility]OSP MED 3 Nelson Holman MD    Subjective:     Principal Problem:Pyelonephritis        HPI:  Patient is a 19 M with PMH asthma, eczema (takes dupilumab), recurrent staph infections 2/2 itching his eczema on his arms, pneumonia 2/2 requiring a prior admission to the hospital, and ADHD (per the chart) who presented to [facility] ED for right sided abdominal pain and urinary burning.    Patient states that he had trauma to his right hand requiring screw placement by ortho about 1 month ago. About 2 weeks ago, he started noticing progressively worsening abdominal pain. He then started to have urinary burning and the abdominal pain became unbearable. He came to the ED on 2/2 for evaluation. He was febrile to 103 and tachycardic to the 180s (EKG sinus tach), SBP in 100s-110s. Septic workup initiated with UA, CXR, labs and lactic. White count elevated to 14, lactic 2.7, creatinine 1 (up from 0.7 at baseline), electrolytes normal, UA showing nitrites, >100 WBCs, and many bacteria, and CT A/P with contrast that showed duplicated R ureteral system with hydronephrosis. He was given 2L NS and ceftriaxone for complicated UTI in the ED and urology consulted. Urology recommended IR nephrostomy tube placement to R upper and lower poles. He went to IR and had neph tubes placed without complication.     He is admitted to [facility] for further evaluation for complicated UTI. Urology following along.    Overview/Hospital Course:  Patient with duplicated ureteral system here for complicated UTI requiring placement of 2 R nephrostomy tubes admitted to 3. Continuing ceftriaxone, all cultures remain no growth to date. Tachycardia, febrile periods  and pain continues at nephrostomy tube site. Urology following and will plan for ureterocele puncture when OP. Added robaxin, gabapentin and scheduled tylenol to assist with pain control. Abx adjusted as patient remains culture negative, continue cefdinir.     Interval history:  Patient complains of pain at neph tube insertion site. Patient utilizing all PRN medications. Added on gabapentin and scheduled tylenol today. Encouraged patient to ambulate to assist with pain control and recovery. He denies any chronic opiate use. Abx de-escalated to cefdinir for 14 day course. All cultures negative. Patient will require aggressive weaning of pain medication prior to discharge. Examination not consistent with any operative issue related to neph tubes. Counts stable, HR now normalized, no further fevers.     Review of Systems   Constitutional: Negative for chills and fever.   HENT: Negative for hearing loss, sinus pain, sneezing and sore throat.    Eyes: Negative for photophobia and visual disturbance.   Respiratory: Negative for cough, chest tightness, shortness of breath and wheezing.    Cardiovascular: Negative for chest pain, palpitations and leg swelling.   Gastrointestinal: Negative for abdominal pain, constipation, diarrhea, nausea and vomiting.   Endocrine: Negative for polydipsia, polyphagia and polyuria.   Genitourinary: Negative for dysuria, hematuria and urgency.   Musculoskeletal: Positive for back pain (at neph tube insertion site). Negative for arthralgias.   Skin: Negative for rash.   Allergic/Immunologic: Negative for immunocompromised state.   Neurological: Negative for dizziness and headaches.   Hematological: Negative for adenopathy.   Psychiatric/Behavioral: Negative for agitation and behavioral problems.     Objective:     Vital Signs (Most Recent):  Temp: 97.6 °F (36.4 °C) (02/05/20 1142)  Pulse: 82 (02/05/20 1142)  Resp: 18 (02/05/20 1142)  BP: 122/69 (02/05/20 1142)  SpO2: 95 % (02/05/20 1142) Vital  Signs (24h Range):  Temp:  [96.8 °F (36 °C)-98.3 °F (36.8 °C)] 97.6 °F (36.4 °C)  Pulse:  [] 82  Resp:  [17-20] 18  SpO2:  [92 %-100 %] 95 %  BP: (121-129)/(60-74) 122/69     Weight: 72.6 kg (160 lb)  Body mass index is 27.46 kg/m².    Physical Exam   Constitutional: He is oriented to person, place, and time. He appears well-developed and well-nourished. No distress.   Sleeping comfortably when I entered the room   HENT:   Head: Atraumatic.   Eyes: Right eye exhibits no discharge. Left eye exhibits no discharge. No scleral icterus.   Neck: Neck supple. No JVD present.   Cardiovascular: Normal rate and regular rhythm. Exam reveals no gallop and no friction rub.   No murmur heard.  Pulmonary/Chest: Effort normal and breath sounds normal. No respiratory distress. He has no wheezes. He has no rales.   Abdominal: Soft. He exhibits no distension. There is no tenderness (right sided tenderness. neph tube site on right flank clean dry and intact).   Musculoskeletal: He exhibits no edema or tenderness.   Neurological: He is alert and oriented to person, place, and time. No cranial nerve deficit or sensory deficit.   Skin: Skin is dry.   Psychiatric: He has a normal mood and affect.   Nursing note and vitals reviewed.          Significant Labs:   Blood Culture:   Recent Labs   Lab 02/03/20 1919 02/03/20 1920   LABBLOO No Growth to date  No Growth to date No Growth to date  No Growth to date     CBC:   Recent Labs   Lab 02/04/20  0506 02/05/20  0514   WBC 10.64 7.64   HGB 11.9* 11.5*   HCT 36.8* 35.4*    340     CMP:   Recent Labs   Lab 02/04/20  0506 02/05/20  0514    138   K 3.4* 3.7   CL 99 101   CO2 24 28   * 101   BUN 4* 5*   CREATININE 0.7 0.7   CALCIUM 8.7 9.1   PROT 6.6 6.7   ALBUMIN 2.5* 2.4*   BILITOT 0.6 0.5   ALKPHOS 75 65   AST 23 17   ALT 17 16   ANIONGAP 13 9   EGFRNONAA >60.0 >60.0     Lactic Acid:   No results for input(s): LACTATE in the last 48 hours.  Troponin:   No results for  input(s): TROPONINI in the last 48 hours.  Urine Culture:   No results for input(s): LABURIN in the last 48 hours.  Urine Studies:   No results for input(s): COLORU, APPEARANCEUA, PHUR, SPECGRAV, PROTEINUA, GLUCUA, KETONESU, BILIRUBINUA, OCCULTUA, NITRITE, UROBILINOGEN, LEUKOCYTESUR, RBCUA, WBCUA, BACTERIA, SQUAMEPITHEL, HYALINECASTS in the last 48 hours.    Invalid input(s): YAJAIRA    Significant Imaging: I have reviewed all pertinent imaging results/findings within the past 24 hours.      Assessment/Plan:      * Pyelonephritis  Patient with complicated right duplicate ureteral system with hydronephrosis and now pyelonephritis requiring nephrostomy tube placement. Urology following. Patient s/p R nephrostomy tubes to R upper and lower poles on 2/3. Patient with continued tachycardia, but very clear source of infection with UA and hydronephrosis. Micro studies of urine directly from R kidney are pending. Blood cultures pending. Patient empirically started on ceftriaxone. Will try to get his pain under better control as well, as that could also be contributing to tachycardia. His BP is stable in the 130s systolic, currently appears stable for the floor, but low threshold to consult ICU if hypotensive.    PLAN:  - abx de-escalated to cefdinir   - f/u urine culture and sensitivities - all NGTD  - f/u blood cultures - NGTD  - f/u urology recs - urology signed off   - pain control, titrating up on multimodal therapy - patient states in extreme pain, sleeping comfortably when I entered the room today, mother remains at bedside   - monitor creatinine      Nephrostomy status  - patient with significant pain from nephrostomy tube site  - continue oxycodone and dilaudid IV for now  - added robaxin   - will attempt to wean in next 24 hours  - OOB encouraged  - PT/OT     Moderate tetrahydrocannabinol (THC) dependence        Duplicated ureter, right  See pyelonephritis      Discharge planning issues  Given history of eczema,  allergies, prior staph infections, pneumonia (although patient attributes that to vaping), and now duplicated ureter with pyelonephritis, may consider outpatient referral to immunologist for congenital immunodeficiency workup.    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

## 2020-02-05 NOTE — PLAN OF CARE
Problem: Physical Therapy Goal  Goal: Physical Therapy Goal  Description  Goals to be met by: 2020    Patient will increase functional independence with mobility by performin. Gait  x 250 feet with Supervision and no assistive device - met 2020  2. Ascend/descend 4 stairs with left Handrails Supervision - met 2020        Outcome: Met   Patient is cooperative and agreeable to therapy. Patient demonstrates good motivation and excellent functional mobility. Patient with activity tolerance currently limited secondary to pain. Patient requires only supervision with ambulation secondary to increased pain with mobility. Patient ambulated ~225 ft x 2 with supervision only and no assistive device. Patient ascended/descended 4 stairs without handrails and supervision only. Patient has met 2/2 goals in plan of care. Patient is without skilled physical therapy needs while in acute care setting and is appropriate for discharge.

## 2020-02-05 NOTE — ASSESSMENT & PLAN NOTE
19 y.o. male with a history of asthma and ADHD who presents due to right-sided pyelonephritis and concern for ureteral obstruction in the setting of a complete right-sided duplicated system.    - Continue drainage of duplicated system with upper and lower pole nephrostomy tubes.  - Clean catch urine culture, right nephrostomy urine culture, and blood cultures have all shown NGTD. Follow cultures until they finalize.  - Patient has improved clinically on Rocephin. Recommend de-escalation to PO cefdinir for 14 day total antibiotic course for complicated UTI.  - Will arrange for outpatient ureterocele puncture while on antibiotics.  - Continue care per primary.  - Urology will sign off at this time. Please call with questions.

## 2020-02-05 NOTE — ASSESSMENT & PLAN NOTE
Given history of eczema, allergies, prior staph infections, pneumonia (although patient attributes that to vaping), and now duplicated ureter with pyelonephritis, may consider outpatient referral to immunologist for congenital immunodeficiency workup.    IgE elevated  No congenital facies to corroborate with hyper IgE  Will need further eval when OP   Will need urology follow up for ureterocele puncture  Will need to be weaning off IV pain medication

## 2020-02-05 NOTE — PLAN OF CARE
AAOx4, up to chair and ambulated during day and going to the bathroom to void, pain controlled with prn medications, neph tubes unclamped and draining, dressing c/d/I, no falls, VS as charted.

## 2020-02-05 NOTE — H&P (VIEW-ONLY)
[facility]  Urology  Progress Note    Patient Name: Fernie Rice  MRN: [id number]  Admission Date: 2/2/2020  Hospital Length of Stay: 3 days  Code Status: Full Code   Attending Provider: Destiny Connelly MD   Primary Care Physician: Fernie Garcia MD    Subjective:     HPI:  19 y.o. male with a history of asthma and ADHD who presents due to right flank pain coupled with subjective fevers and shaking chills. Patient states that he has had a 2 week history of intermittent dysuria for which he went to an urgent care on 1/22. No UA or urine culture was ordered but GC/chlamydia negative. In addition, he has had sharp intermittent right flank pain for the past two weeks. Beginning last night, he began to experience subjective fevers coupled with shaking chills. He has had nausea and vomiting today. Patient denies gross hematuria, frequency, urgency, suprapubic pain. No history of UTI's. In the ED, he is febrile to 103 and tachycardic to 141. His lactate on presentation was 2.7. His vitals and LA improved s/p 2.2 L of normal saline boluses. Clean catch UA was nitrite positive and showed 5 RBC/hpf, >100 WBC/hpf, and many bacteria. 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 He has never seen a urologist and was unaware of his duplicated system.    Interval History:  Resting comfortably in bed this AM. Ambulated in the halls multiple times. White count continues to downtrend. UOP excellent and neph tubes draining well. Cultures have all shown NGTD. Has been afebrile for the past 24 hours and vitals are stable.    Objective:     Temp:  [96.8 °F (36 °C)-99.2 °F (37.3 °C)] 98.3 °F (36.8 °C)  Pulse:  [] 96  Resp:  [18-20] 18  SpO2:  [92 %-100 %] 92 %  BP: (118-129)/(60-74) 128/60      Body mass index is 27.46 kg/m².           Drains     Drain                 Nephrostomy 02/02/20 2252 Right 8 Fr. less than 1 day         Nephrostomy 02/02/20 2252 Right 8 Fr. less than 1 day                Physical Exam   Nursing note and vitals reviewed.  Constitutional: He is oriented to person, place, and time. He appears well-developed and well-nourished. No distress.   HENT:   Head: Normocephalic and atraumatic.   Eyes: Pupils are equal, round, and reactive to light. Right eye exhibits no discharge. Left eye exhibits no discharge.   Cardiovascular: Normal rate.    Pulmonary/Chest: Effort normal. No stridor. No respiratory distress.   Abdominal: Soft. He exhibits no distension. There is no tenderness.   Right CVA tenderness    Right nephrostomy tubes x2 draining clear yellow urine   Neurological: He is alert and oriented to person, place, and time.   Skin: Skin is warm and dry.     Psychiatric: He has a normal mood and affect. His behavior is normal. Judgment and thought content normal.       Significant Labs:    BMP:  Recent Labs   Lab 02/03/20  0633 02/04/20  0506 02/05/20  0514    136 138   K 3.7 3.4* 3.7    99 101   CO2 22* 24 28   BUN 6 4* 5*   CREATININE 0.7 0.7 0.7   CALCIUM 8.0* 8.7 9.1       CBC:   Recent Labs   Lab 02/03/20  0633 02/04/20  0506 02/05/20  0514   WBC 13.66* 10.64 7.64   HGB 11.5* 11.9* 11.5*   HCT 35.8* 36.8* 35.4*    324 340       Assessment/Plan:     * Pyelonephritis  19 y.o. male with a history of asthma and ADHD who presents due to right-sided pyelonephritis and concern for ureteral obstruction in the setting of a complete right-sided duplicated system.    - Continue drainage of duplicated system with upper and lower pole nephrostomy tubes.  - Clean catch urine culture, right nephrostomy urine culture, and blood cultures have all shown NGTD. Follow cultures until they finalize.  - Patient has improved clinically on Rocephin. Recommend de-escalation to PO cefdinir  for 14 day total antibiotic course for complicated UTI.  - Will arrange for outpatient ureterocele puncture while on antibiotics.  - Continue care per primary.  - Urology will sign off at this time. Please call with questions.        VTE Risk Mitigation (From admission, onward)         Ordered     Place sequential compression device  Until discontinued      02/03/20 0001     IP VTE LOW RISK PATIENT  Once      02/03/20 0001                Fernie Macedo MD  Urology  [facility]

## 2020-02-05 NOTE — PHARMACY MED REC
"Admission Medication Reconciliation - Pharmacy Consult Note    The home medication history was taken by Harmony Lanza, pharmacy technician.  Based on information gathered and subsequent review by the clinical pharmacist, the items below may need attention.     You may go to "Admission" then "Reconcile Home Medications" tabs to review and/or act upon these items.     Potentially problematic discrepancies with current MAR  o Patient IS taking the following which was not ordered upon admit  o Acyclovir 800 mg TID PRN   o Dupixent 300 mg SQ every 14 days (held)     Please address this information as you see fit.  Feel free to contact us if you have any questions or require assistance.  Jayde Todd  EXT [phone]     .    .            "

## 2020-02-05 NOTE — PROGRESS NOTES
[facility]  Urology  Progress Note    Patient Name: Fernie Rice  MRN: [id number]  Admission Date: 2/2/2020  Hospital Length of Stay: 3 days  Code Status: Full Code   Attending Provider: Destiny Connelly MD   Primary Care Physician: Fernie Garcia MD    Subjective:     HPI:  19 y.o. male with a history of asthma and ADHD who presents due to right flank pain coupled with subjective fevers and shaking chills. Patient states that he has had a 2 week history of intermittent dysuria for which he went to an urgent care on 1/22. No UA or urine culture was ordered but GC/chlamydia negative. In addition, he has had sharp intermittent right flank pain for the past two weeks. Beginning last night, he began to experience subjective fevers coupled with shaking chills. He has had nausea and vomiting today. Patient denies gross hematuria, frequency, urgency, suprapubic pain. No history of UTI's. In the ED, he is febrile to 103 and tachycardic to 141. His lactate on presentation was 2.7. His vitals and LA improved s/p 2.2 L of normal saline boluses. Clean catch UA was nitrite positive and showed 5 RBC/hpf, >100 WBC/hpf, and many bacteria. 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 He has never seen a urologist and was unaware of his duplicated system.    Interval History:  Resting comfortably in bed this AM. Ambulated in the halls multiple times. White count continues to downtrend. UOP excellent and neph tubes draining well. Cultures have all shown NGTD. Has been afebrile for the past 24 hours and vitals are stable.    Objective:     Temp:  [96.8 °F (36 °C)-99.2 °F (37.3 °C)] 98.3 °F (36.8 °C)  Pulse:  [] 96  Resp:  [18-20] 18  SpO2:  [92 %-100 %] 92 %  BP: (118-129)/(60-74) 128/60      Body mass index is 27.46 kg/m².           Drains     Drain                 Nephrostomy 02/02/20 2252 Right 8 Fr. less than 1 day         Nephrostomy 02/02/20 2252 Right 8 Fr. less than 1 day                Physical Exam   Nursing note and vitals reviewed.  Constitutional: He is oriented to person, place, and time. He appears well-developed and well-nourished. No distress.   HENT:   Head: Normocephalic and atraumatic.   Eyes: Pupils are equal, round, and reactive to light. Right eye exhibits no discharge. Left eye exhibits no discharge.   Cardiovascular: Normal rate.    Pulmonary/Chest: Effort normal. No stridor. No respiratory distress.   Abdominal: Soft. He exhibits no distension. There is no tenderness.   Right CVA tenderness    Right nephrostomy tubes x2 draining clear yellow urine   Neurological: He is alert and oriented to person, place, and time.   Skin: Skin is warm and dry.     Psychiatric: He has a normal mood and affect. His behavior is normal. Judgment and thought content normal.       Significant Labs:    BMP:  Recent Labs   Lab 02/03/20  0633 02/04/20  0506 02/05/20  0514    136 138   K 3.7 3.4* 3.7    99 101   CO2 22* 24 28   BUN 6 4* 5*   CREATININE 0.7 0.7 0.7   CALCIUM 8.0* 8.7 9.1       CBC:   Recent Labs   Lab 02/03/20  0633 02/04/20  0506 02/05/20  0514   WBC 13.66* 10.64 7.64   HGB 11.5* 11.9* 11.5*   HCT 35.8* 36.8* 35.4*    324 340       Assessment/Plan:     * Pyelonephritis  19 y.o. male with a history of asthma and ADHD who presents due to right-sided pyelonephritis and concern for ureteral obstruction in the setting of a complete right-sided duplicated system.    - Continue drainage of duplicated system with upper and lower pole nephrostomy tubes.  - Clean catch urine culture, right nephrostomy urine culture, and blood cultures have all shown NGTD. Follow cultures until they finalize.  - Patient has improved clinically on Rocephin. Recommend de-escalation to PO cefdinir  for 14 day total antibiotic course for complicated UTI.  - Will arrange for outpatient ureterocele puncture while on antibiotics.  - Continue care per primary.  - Urology will sign off at this time. Please call with questions.        VTE Risk Mitigation (From admission, onward)         Ordered     Place sequential compression device  Until discontinued      02/03/20 0001     IP VTE LOW RISK PATIENT  Once      02/03/20 0001                Fernie Macedo MD  Urology  [facility]

## 2020-02-05 NOTE — SUBJECTIVE & OBJECTIVE
Interval History:  Resting comfortably in bed this AM. Ambulated in the halls multiple times. White count continues to downtrend. UOP excellent and neph tubes draining well. Cultures have all shown NGTD. Has been afebrile for the past 24 hours and vitals are stable.    Objective:     Temp:  [96.8 °F (36 °C)-99.2 °F (37.3 °C)] 98.3 °F (36.8 °C)  Pulse:  [] 96  Resp:  [18-20] 18  SpO2:  [92 %-100 %] 92 %  BP: (118-129)/(60-74) 128/60     Body mass index is 27.46 kg/m².           Drains     Drain                 Nephrostomy 02/02/20 2252 Right 8 Fr. less than 1 day         Nephrostomy 02/02/20 2252 Right 8 Fr. less than 1 day                Physical Exam   Nursing note and vitals reviewed.  Constitutional: He is oriented to person, place, and time. He appears well-developed and well-nourished. No distress.   HENT:   Head: Normocephalic and atraumatic.   Eyes: Pupils are equal, round, and reactive to light. Right eye exhibits no discharge. Left eye exhibits no discharge.   Cardiovascular: Normal rate.    Pulmonary/Chest: Effort normal. No stridor. No respiratory distress.   Abdominal: Soft. He exhibits no distension. There is no tenderness.   Right CVA tenderness    Right nephrostomy tubes x2 draining clear yellow urine   Neurological: He is alert and oriented to person, place, and time.   Skin: Skin is warm and dry.     Psychiatric: He has a normal mood and affect. His behavior is normal. Judgment and thought content normal.       Significant Labs:    BMP:  Recent Labs   Lab 02/03/20  0633 02/04/20  0506 02/05/20  0514    136 138   K 3.7 3.4* 3.7    99 101   CO2 22* 24 28   BUN 6 4* 5*   CREATININE 0.7 0.7 0.7   CALCIUM 8.0* 8.7 9.1       CBC:   Recent Labs   Lab 02/03/20  0633 02/04/20  0506 02/05/20  0514   WBC 13.66* 10.64 7.64   HGB 11.5* 11.9* 11.5*   HCT 35.8* 36.8* 35.4*    861 340

## 2020-02-05 NOTE — PT/OT/SLP PROGRESS
"Physical Therapy Treatment and Discharge    Patient Name:  Fernie Rice   MRN:  [id number]    Recommendations:     Discharge Recommendations:  home   Discharge Equipment Recommendations: none   Barriers to discharge: None    Assessment:     Fernie Rice is a 19 y.o. male admitted with a medical diagnosis of Pyelonephritis.  He presents with the following impairments/functional limitations:  pain.       Patient is cooperative and agreeable to therapy. Patient demonstrates good motivation and excellent functional mobility. Patient with activity tolerance currently limited secondary to pain. Patient requires only supervision with ambulation secondary to increased pain with mobility. Patient ambulated ~225 ft x 2 with supervision only and no assistive device. Patient ascended/descended 4 stairs without handrails and supervision only.     Rehab Prognosis: Good; Patient has met 2/2 goals in plan of care. Patient without needs for skilled physical therapy while in acute care setting. Patient remains most appropriate to discharge to home.  Recent Surgery: * No surgery found *      Subjective     Subjective: "It hurts when I take a step with my right leg."   Pain/Comfort:  · Pain Rating 1: 8/10(With mobility)  · Location - Side 1: Right  · Location - Orientation 1: generalized  · Location 1: (abdomen and back near incision)  · Pain Addressed 1: Distraction, Cessation of Activity  · Pain Rating Post-Intervention 1: (Did not rate)      Objective:     Communicated with RN prior to session.  Patient found supine with nephrostomy, telemetry upon PT entry to room.     General Precautions: Standard, fall   Orthopedic Precautions:N/A   Braces: N/A     Functional Mobility:  · Bed Mobility:     · Supine to Sit: independence  · Transfers:     · Sit to Stand:  independence with no AD  · Gait: Patient ambulated 225 ft with supervision and no assistive device  · Stairs:  Pt ascended/descended 4 stair(s) with No Assistive Device with " no handrails with Supervision or Set-up Assistance.       AM-PAC 6 CLICK MOBILITY  Turning over in bed (including adjusting bedclothes, sheets and blankets)?: 4  Sitting down on and standing up from a chair with arms (e.g., wheelchair, bedside commode, etc.): 4  Moving from lying on back to sitting on the side of the bed?: 4  Moving to and from a bed to a chair (including a wheelchair)?: 4  Need to walk in hospital room?: 4  Climbing 3-5 steps with a railing?: 4  Basic Mobility Total Score: 24       Therapeutic Activities:   Patient with assistance as noted above. Patient with good functional mobility and activity tolerance, limited secondary only to pain.    Patient Education:    Patient and Mother educated on benefits of OOB activity while in hospital by explanation.  Patient and Mother were receptive to education and verbalizes understanding.     Patient left in bathroom with all lines intact and mother and nursing present.    GOALS:   Multidisciplinary Problems     Physical Therapy Goals     Not on file          Multidisciplinary Problems (Resolved)        Problem: Physical Therapy Goal    Goal Priority Disciplines Outcome Goal Variances Interventions   Physical Therapy Goal   (Resolved)     PT, PT/OT Met     Description:  Goals to be met by: 2020    Patient will increase functional independence with mobility by performin. Gait  x 250 feet with Supervision and no assistive device - met 2020  2. Ascend/descend 4 stairs with left Handrails Supervision - met 2020                         Time Tracking:     PT Received On: 20  PT Start Time: 1022     PT Stop Time: 1032  PT Total Time (min): 10 min     Billable Minutes: Therapeutic Activity 10 min    Treatment Type: Treatment  PT/PTA: PT     PTA Visit Number: 0     DESTINEY Hannah  2020

## 2020-02-06 ENCOUNTER — TELEPHONE (OUTPATIENT)
Dept: UROLOGY | Facility: CLINIC | Age: 20
End: 2020-02-06

## 2020-02-06 DIAGNOSIS — N28.89 URETEROCELE: Primary | ICD-10-CM

## 2020-02-06 LAB
ALBUMIN SERPL BCP-MCNC: 2.5 G/DL (ref 3.5–5.2)
ALP SERPL-CCNC: 65 U/L (ref 55–135)
ALT SERPL W/O P-5'-P-CCNC: 20 U/L (ref 10–44)
ANION GAP SERPL CALC-SCNC: 9 MMOL/L (ref 8–16)
AST SERPL-CCNC: 16 U/L (ref 10–40)
BACTERIA SPEC AEROBE CULT: NO GROWTH
BASOPHILS # BLD AUTO: 0.04 K/UL (ref 0–0.2)
BASOPHILS NFR BLD: 0.6 % (ref 0–1.9)
BILIRUB SERPL-MCNC: 0.4 MG/DL (ref 0.1–1)
BUN SERPL-MCNC: 9 MG/DL (ref 6–20)
CALCIUM SERPL-MCNC: 8.9 MG/DL (ref 8.7–10.5)
CHLORIDE SERPL-SCNC: 101 MMOL/L (ref 95–110)
CO2 SERPL-SCNC: 29 MMOL/L (ref 23–29)
CREAT SERPL-MCNC: 0.8 MG/DL (ref 0.5–1.4)
DIFFERENTIAL METHOD: ABNORMAL
EOSINOPHIL # BLD AUTO: 1 K/UL (ref 0–0.5)
EOSINOPHIL NFR BLD: 15.2 % (ref 0–8)
ERYTHROCYTE [DISTWIDTH] IN BLOOD BY AUTOMATED COUNT: 13.2 % (ref 11.5–14.5)
EST. GFR  (AFRICAN AMERICAN): >60 ML/MIN/1.73 M^2
EST. GFR  (NON AFRICAN AMERICAN): >60 ML/MIN/1.73 M^2
FERRITIN SERPL-MCNC: 139 NG/ML (ref 20–300)
GLUCOSE SERPL-MCNC: 108 MG/DL (ref 70–110)
HCT VFR BLD AUTO: 35.5 % (ref 40–54)
HGB BLD-MCNC: 11.4 G/DL (ref 14–18)
IMM GRANULOCYTES # BLD AUTO: 0.03 K/UL (ref 0–0.04)
IMM GRANULOCYTES NFR BLD AUTO: 0.4 % (ref 0–0.5)
IRON SERPL-MCNC: 34 UG/DL (ref 45–160)
LYMPHOCYTES # BLD AUTO: 1.7 K/UL (ref 1–4.8)
LYMPHOCYTES NFR BLD: 25.6 % (ref 18–48)
MAGNESIUM SERPL-MCNC: 1.9 MG/DL (ref 1.6–2.6)
MCH RBC QN AUTO: 28.9 PG (ref 27–31)
MCHC RBC AUTO-ENTMCNC: 32.1 G/DL (ref 32–36)
MCV RBC AUTO: 90 FL (ref 82–98)
MONOCYTES # BLD AUTO: 0.5 K/UL (ref 0.3–1)
MONOCYTES NFR BLD: 7.7 % (ref 4–15)
NEUTROPHILS # BLD AUTO: 3.4 K/UL (ref 1.8–7.7)
NEUTROPHILS NFR BLD: 50.5 % (ref 38–73)
NRBC BLD-RTO: 0 /100 WBC
PLATELET # BLD AUTO: 400 K/UL (ref 150–350)
PMV BLD AUTO: 8.9 FL (ref 9.2–12.9)
POTASSIUM SERPL-SCNC: 3.5 MMOL/L (ref 3.5–5.1)
PROT SERPL-MCNC: 6.9 G/DL (ref 6–8.4)
RBC # BLD AUTO: 3.95 M/UL (ref 4.6–6.2)
SATURATED IRON: 12 % (ref 20–50)
SODIUM SERPL-SCNC: 139 MMOL/L (ref 136–145)
TOTAL IRON BINDING CAPACITY: 278 UG/DL (ref 250–450)
TRANSFERRIN SERPL-MCNC: 188 MG/DL (ref 200–375)
WBC # BLD AUTO: 6.79 K/UL (ref 3.9–12.7)

## 2020-02-06 PROCEDURE — 80053 COMPREHEN METABOLIC PANEL: CPT

## 2020-02-06 PROCEDURE — 83540 ASSAY OF IRON: CPT

## 2020-02-06 PROCEDURE — 85025 COMPLETE CBC W/AUTO DIFF WBC: CPT

## 2020-02-06 PROCEDURE — 25000003 PHARM REV CODE 250: Performed by: STUDENT IN AN ORGANIZED HEALTH CARE EDUCATION/TRAINING PROGRAM

## 2020-02-06 PROCEDURE — 63600175 PHARM REV CODE 636 W HCPCS: Performed by: STUDENT IN AN ORGANIZED HEALTH CARE EDUCATION/TRAINING PROGRAM

## 2020-02-06 PROCEDURE — 99233 PR SUBSEQUENT HOSPITAL CARE,LEVL III: ICD-10-PCS | Mod: ,,, | Performed by: HOSPITALIST

## 2020-02-06 PROCEDURE — 36415 COLL VENOUS BLD VENIPUNCTURE: CPT

## 2020-02-06 PROCEDURE — 82728 ASSAY OF FERRITIN: CPT

## 2020-02-06 PROCEDURE — 83735 ASSAY OF MAGNESIUM: CPT

## 2020-02-06 PROCEDURE — 99233 SBSQ HOSP IP/OBS HIGH 50: CPT | Mod: ,,, | Performed by: HOSPITALIST

## 2020-02-06 PROCEDURE — 11000001 HC ACUTE MED/SURG PRIVATE ROOM

## 2020-02-06 RX ORDER — METHOCARBAMOL 500 MG/1
500 TABLET, FILM COATED ORAL 4 TIMES DAILY
Qty: 40 TABLET | Refills: 0 | OUTPATIENT
Start: 2020-02-06 | End: 2020-02-16

## 2020-02-06 RX ORDER — OXYCODONE HYDROCHLORIDE 10 MG/1
10 TABLET ORAL EVERY 12 HOURS PRN
Qty: 20 TABLET | Refills: 0 | OUTPATIENT
Start: 2020-02-06 | End: 2020-02-13

## 2020-02-06 RX ORDER — NALOXONE HCL 0.4 MG/ML
0.4 VIAL (ML) INJECTION
Status: DISCONTINUED | OUTPATIENT
Start: 2020-02-06 | End: 2020-02-07 | Stop reason: HOSPADM

## 2020-02-06 RX ORDER — LACTULOSE 10 G/15ML
20 SOLUTION ORAL 2 TIMES DAILY
Status: DISCONTINUED | OUTPATIENT
Start: 2020-02-06 | End: 2020-02-07 | Stop reason: HOSPADM

## 2020-02-06 RX ORDER — OXYCODONE HYDROCHLORIDE 10 MG/1
10 TABLET ORAL EVERY 4 HOURS PRN
Status: DISCONTINUED | OUTPATIENT
Start: 2020-02-06 | End: 2020-02-07 | Stop reason: HOSPADM

## 2020-02-06 RX ORDER — GABAPENTIN 300 MG/1
300 CAPSULE ORAL 3 TIMES DAILY
Qty: 90 CAPSULE | Refills: 11 | OUTPATIENT
Start: 2020-02-06 | End: 2021-02-05

## 2020-02-06 RX ORDER — OXYCODONE HYDROCHLORIDE 10 MG/1
10 TABLET ORAL EVERY 6 HOURS PRN
Qty: 20 TABLET | Refills: 0 | Status: SHIPPED | OUTPATIENT
Start: 2020-02-06 | End: 2020-02-07

## 2020-02-06 RX ORDER — METHOCARBAMOL 500 MG/1
500 TABLET, FILM COATED ORAL 4 TIMES DAILY
Qty: 40 TABLET | Refills: 0 | Status: SHIPPED | OUTPATIENT
Start: 2020-02-06 | End: 2020-02-07

## 2020-02-06 RX ORDER — CEFDINIR 125 MG/5ML
300 POWDER, FOR SUSPENSION ORAL EVERY 12 HOURS
Qty: 336 ML | Refills: 0 | OUTPATIENT
Start: 2020-02-06 | End: 2020-02-20

## 2020-02-06 RX ORDER — OXYCODONE HYDROCHLORIDE 10 MG/1
10 TABLET ORAL EVERY 8 HOURS PRN
Qty: 30 TABLET | Refills: 0 | Status: CANCELLED | OUTPATIENT
Start: 2020-02-06 | End: 2020-02-16

## 2020-02-06 RX ORDER — KETOROLAC TROMETHAMINE 10 MG/1
10 TABLET, FILM COATED ORAL EVERY 6 HOURS PRN
Status: DISCONTINUED | OUTPATIENT
Start: 2020-02-06 | End: 2020-02-07 | Stop reason: HOSPADM

## 2020-02-06 RX ORDER — CEFDINIR 300 MG/1
300 CAPSULE ORAL 2 TIMES DAILY
Qty: 22 CAPSULE | Refills: 0 | Status: SHIPPED | OUTPATIENT
Start: 2020-02-06 | End: 2020-02-18

## 2020-02-06 RX ADMIN — GABAPENTIN 300 MG: 300 CAPSULE ORAL at 02:02

## 2020-02-06 RX ADMIN — HYDROMORPHONE HYDROCHLORIDE 1 MG: 1 INJECTION, SOLUTION INTRAMUSCULAR; INTRAVENOUS; SUBCUTANEOUS at 03:02

## 2020-02-06 RX ADMIN — CEFDINIR 300 MG: 125 POWDER, FOR SUSPENSION ORAL at 09:02

## 2020-02-06 RX ADMIN — OXYCODONE HYDROCHLORIDE 10 MG: 10 TABLET ORAL at 02:02

## 2020-02-06 RX ADMIN — ACETAMINOPHEN 650 MG: 325 TABLET ORAL at 12:02

## 2020-02-06 RX ADMIN — OXYCODONE HYDROCHLORIDE 10 MG: 10 TABLET ORAL at 10:02

## 2020-02-06 RX ADMIN — OXYCODONE HYDROCHLORIDE 5 MG: 5 TABLET ORAL at 06:02

## 2020-02-06 RX ADMIN — LACTULOSE 20 G: 20 SOLUTION ORAL at 09:02

## 2020-02-06 RX ADMIN — METHOCARBAMOL TABLETS 500 MG: 500 TABLET, COATED ORAL at 09:02

## 2020-02-06 RX ADMIN — GABAPENTIN 300 MG: 300 CAPSULE ORAL at 09:02

## 2020-02-06 RX ADMIN — GABAPENTIN 300 MG: 300 CAPSULE ORAL at 08:02

## 2020-02-06 RX ADMIN — METHOCARBAMOL TABLETS 500 MG: 500 TABLET, COATED ORAL at 01:02

## 2020-02-06 RX ADMIN — ACETAMINOPHEN 650 MG: 325 TABLET ORAL at 06:02

## 2020-02-06 RX ADMIN — CEFDINIR 300 MG: 125 POWDER, FOR SUSPENSION ORAL at 08:02

## 2020-02-06 RX ADMIN — KETOROLAC TROMETHAMINE 10 MG: 10 TABLET, FILM COATED ORAL at 01:02

## 2020-02-06 RX ADMIN — POLYETHYLENE GLYCOL 3350 17 G: 17 POWDER, FOR SOLUTION ORAL at 09:02

## 2020-02-06 RX ADMIN — DOCUSATE SODIUM - SENNOSIDES 1 TABLET: 50; 8.6 TABLET, FILM COATED ORAL at 09:02

## 2020-02-06 RX ADMIN — LACTULOSE 20 G: 20 SOLUTION ORAL at 08:02

## 2020-02-06 RX ADMIN — METHOCARBAMOL TABLETS 500 MG: 500 TABLET, COATED ORAL at 04:02

## 2020-02-06 RX ADMIN — METHOCARBAMOL TABLETS 500 MG: 500 TABLET, COATED ORAL at 08:02

## 2020-02-06 RX ADMIN — Medication 9 MG: at 08:02

## 2020-02-06 RX ADMIN — ACETAMINOPHEN 650 MG: 325 TABLET ORAL at 11:02

## 2020-02-06 RX ADMIN — OXYCODONE HYDROCHLORIDE 10 MG: 10 TABLET ORAL at 08:02

## 2020-02-06 NOTE — PLAN OF CARE
AAOx4, up to chair and ambulated during day and going to the bathroom to void, pain controlled with prn medications, neph tubes unclamped and draining, dressing c/d/I, no falls, VS as charted

## 2020-02-06 NOTE — PROGRESS NOTES
[facility] Hospital Medicine  Progress Note    Patient Name: Fernie Rice  MRN: [id number]  Patient Class: IP- Inpatient   Admission Date: 2/2/2020  Length of Stay: 4 days  Attending Physician: Destiny Connelly MD  Primary Care Provider: Fernie Garcia MD    [facility] Medicine Team: [facility] [place] [place] HOSP MED 3 Radha Sanon MD    Subjective:     Principal Problem:Pyelonephritis        HPI:  Patient is a 19 M with PMH asthma, eczema (takes dupilumab), recurrent staph infections 2/2 itching his eczema on his arms, pneumonia 2/2 requiring a prior admission to the hospital, and ADHD (per the chart) who presented to [facility] [place] [place] ED for right sided abdominal pain and urinary burning.    Patient states that he had trauma to his right hand requiring screw placement by ortho about 1 month ago. About 2 weeks ago, he started noticing progressively worsening abdominal pain. He then started to have urinary burning and the abdominal pain became unbearable. He came to the ED on 2/2 for evaluation. He was febrile to 103 and tachycardic to the 180s (EKG sinus tach), SBP in 100s-110s. Septic workup initiated with UA, CXR, labs and lactic. White count elevated to 14, lactic 2.7, creatinine 1 (up from 0.7 at baseline), electrolytes normal, UA showing nitrites, >100 WBCs, and many bacteria, and CT A/P with contrast that showed duplicated R ureteral system with hydronephrosis. He was given 2L NS and ceftriaxone for complicated UTI in the ED and urology consulted. Urology recommended IR nephrostomy tube placement to R upper and lower poles. He went to IR and had neph tubes placed without complication.     He is admitted to [facility] for further evaluation for complicated UTI. Urology following along.    Overview/Hospital Course:  Patient with duplicated ureteral system here for complicated UTI requiring placement of 2 R nephrostomy tubes admitted to 3. Continuing ceftriaxone, all cultures remain no growth to date. Tachycardia, febrile periods  and pain continues at nephrostomy tube site. Urology following and will plan for ureterocele puncture when OP. Added robaxin, gabapentin and scheduled tylenol to assist with pain control. Abx adjusted as patient remains culture negative, continue cefdinir.     Interval history:  Patient complains of pain at neph tube insertion site. Patient utilizing all PRN medications.Encouraged patient to ambulate to assist with pain control and recovery. He denies any chronic opiate use. Abx de-escalated to cefdinir for 14 day course. All cultures negative. Patient will require aggressive weaning of pain medication prior to discharge. Examination not consistent with any operative issue related to neph tubes. Counts stable, HR now normalized, no further fevers. No BM for 4 days, Lactulose oral added.     Review of Systems   Constitutional: Negative for chills and fever.   HENT: Negative for hearing loss, sinus pain, sneezing and sore throat.    Eyes: Negative for photophobia and visual disturbance.   Respiratory: Negative for cough, chest tightness, shortness of breath and wheezing.    Cardiovascular: Negative for chest pain, palpitations and leg swelling.   Gastrointestinal: Negative for abdominal pain, constipation, diarrhea, nausea and vomiting.   Endocrine: Negative for polydipsia, polyphagia and polyuria.   Genitourinary: Negative for dysuria, hematuria and urgency.   Musculoskeletal: Positive for back pain (at neph tube insertion site). Negative for arthralgias.   Skin: Negative for rash.   Allergic/Immunologic: Negative for immunocompromised state.   Neurological: Negative for dizziness and headaches.   Hematological: Negative for adenopathy.   Psychiatric/Behavioral: Negative for agitation and behavioral problems.     Objective:     Vital Signs (Most Recent):  Temp: 98.1 °F (36.7 °C) (02/06/20 0755)  Pulse: 87 (02/06/20 0755)  Resp: 16 (02/06/20 0755)  BP: 118/65 (02/06/20 0755)  SpO2: (!) 93 % (02/06/20 0755) Vital Signs  (24h Range):  Temp:  [96.1 °F (35.6 °C)-98.2 °F (36.8 °C)] 98.1 °F (36.7 °C)  Pulse:  [82-96] 87  Resp:  [16-18] 16  SpO2:  [93 %-96 %] 93 %  BP: (118-137)/(62-84) 118/65     Weight: 72.6 kg (160 lb)  Body mass index is 27.46 kg/m².    Physical Exam   Constitutional: He is oriented to person, place, and time. He appears well-developed and well-nourished. No distress.   Sleeping comfortably when I entered the room   HENT:   Head: Atraumatic.   Eyes: Right eye exhibits no discharge. Left eye exhibits no discharge. No scleral icterus.   Neck: Neck supple. No JVD present.   Cardiovascular: Normal rate and regular rhythm. Exam reveals no gallop and no friction rub.   No murmur heard.  Pulmonary/Chest: Effort normal and breath sounds normal. No respiratory distress. He has no wheezes. He has no rales.   Abdominal: Soft. He exhibits no distension. There is no tenderness (right sided tenderness. neph tube site on right flank clean dry and intact).   Musculoskeletal: He exhibits no edema or tenderness.   Neurological: He is alert and oriented to person, place, and time. No cranial nerve deficit or sensory deficit.   Skin: Skin is dry.   Psychiatric: He has a normal mood and affect.   Nursing note and vitals reviewed.          Significant Labs:   Blood Culture:   No results for input(s): LABBLOO in the last 48 hours.  CBC:   Recent Labs   Lab 02/05/20  0514 02/06/20  0343   WBC 7.64 6.79   HGB 11.5* 11.4*   HCT 35.4* 35.5*    400*     CMP:   Recent Labs   Lab 02/05/20  0514 02/06/20  0343    139   K 3.7 3.5    101   CO2 28 29    108   BUN 5* 9   CREATININE 0.7 0.8   CALCIUM 9.1 8.9   PROT 6.7 6.9   ALBUMIN 2.4* 2.5*   BILITOT 0.5 0.4   ALKPHOS 65 65   AST 17 16   ALT 16 20   ANIONGAP 9 9   EGFRNONAA >60.0 >60.0     Lactic Acid:   No results for input(s): LACTATE in the last 48 hours.  Troponin:   No results for input(s): TROPONINI in the last 48 hours.  Urine Culture:   No results for input(s):  LABURIN in the last 48 hours.  Urine Studies:   No results for input(s): COLORU, APPEARANCEUA, PHUR, SPECGRAV, PROTEINUA, GLUCUA, KETONESU, BILIRUBINUA, OCCULTUA, NITRITE, UROBILINOGEN, LEUKOCYTESUR, RBCUA, WBCUA, BACTERIA, SQUAMEPITHEL, HYALINECASTS in the last 48 hours.    Invalid input(s): YAJAIRA    Significant Imaging: I have reviewed all pertinent imaging results/findings within the past 24 hours.      Assessment/Plan:      * Pyelonephritis  Patient with complicated right duplicate ureteral system with hydronephrosis and now pyelonephritis requiring nephrostomy tube placement. Urology following. Patient s/p R nephrostomy tubes to R upper and lower poles on 2/3. Patient with continued tachycardia, but very clear source of infection with UA and hydronephrosis. Micro studies of urine directly from R kidney are pending. Blood cultures pending. Patient empirically started on ceftriaxone. Will try to get his pain under better control as well, as that could also be contributing to tachycardia. His BP is stable in the 130s systolic, currently appears stable for the floor, but low threshold to consult ICU if hypotensive.    PLAN:  - abx de-escalated to cefdinir   - f/u urine culture and sensitivities - all NGTD  - f/u blood cultures - NGTD  - f/u urology recs - urology signed off   - pain control, titrating up on multimodal therapy - patient states in extreme pain, sleeping comfortably when I entered the room today, mother remains at bedside   - monitor creatinine      Nephrostomy status  - patient with significant pain from nephrostomy tube site  - continue oxycodone and dilaudid IV for now  - added robaxin   - will attempt to wean in next 24 hours  - OOB encouraged  - PT/OT     Moderate tetrahydrocannabinol (THC) dependence        Duplicated ureter, right  See pyelonephritis      Discharge planning issues  Given history of eczema, allergies, prior staph infections, pneumonia (although patient attributes that to  vaping), and now duplicated ureter with pyelonephritis, may consider outpatient referral to immunologist for congenital immunodeficiency workup.    IgE elevated  No congenital facies to corroborate with hyper IgE  Will need further eval when OP   Will need urology follow up for ureterocele puncture  Will need to be weaning off IV pain medication   - Urology/Immunology referral upon DC.     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

## 2020-02-06 NOTE — SUBJECTIVE & OBJECTIVE
Interval history:  Patient complains of pain at neph tube insertion site. Patient utilizing all PRN medications.Encouraged patient to ambulate to assist with pain control and recovery. He denies any chronic opiate use. Abx de-escalated to cefdinir for 14 day course. All cultures negative. Patient will require aggressive weaning of pain medication prior to discharge. Examination not consistent with any operative issue related to neph tubes. Counts stable, HR now normalized, no further fevers. No BM for 4 days, Lactulose oral added.     Review of Systems   Constitutional: Negative for chills and fever.   HENT: Negative for hearing loss, sinus pain, sneezing and sore throat.    Eyes: Negative for photophobia and visual disturbance.   Respiratory: Negative for cough, chest tightness, shortness of breath and wheezing.    Cardiovascular: Negative for chest pain, palpitations and leg swelling.   Gastrointestinal: Negative for abdominal pain, constipation, diarrhea, nausea and vomiting.   Endocrine: Negative for polydipsia, polyphagia and polyuria.   Genitourinary: Negative for dysuria, hematuria and urgency.   Musculoskeletal: Positive for back pain (at neph tube insertion site). Negative for arthralgias.   Skin: Negative for rash.   Allergic/Immunologic: Negative for immunocompromised state.   Neurological: Negative for dizziness and headaches.   Hematological: Negative for adenopathy.   Psychiatric/Behavioral: Negative for agitation and behavioral problems.     Objective:     Vital Signs (Most Recent):  Temp: 98.1 °F (36.7 °C) (02/06/20 0755)  Pulse: 87 (02/06/20 0755)  Resp: 16 (02/06/20 0755)  BP: 118/65 (02/06/20 0755)  SpO2: (!) 93 % (02/06/20 0755) Vital Signs (24h Range):  Temp:  [96.1 °F (35.6 °C)-98.2 °F (36.8 °C)] 98.1 °F (36.7 °C)  Pulse:  [82-96] 87  Resp:  [16-18] 16  SpO2:  [93 %-96 %] 93 %  BP: (118-137)/(62-84) 118/65     Weight: 72.6 kg (160 lb)  Body mass index is 27.46 kg/m².    Physical Exam    Constitutional: He is oriented to person, place, and time. He appears well-developed and well-nourished. No distress.   Sleeping comfortably when I entered the room   HENT:   Head: Atraumatic.   Eyes: Right eye exhibits no discharge. Left eye exhibits no discharge. No scleral icterus.   Neck: Neck supple. No JVD present.   Cardiovascular: Normal rate and regular rhythm. Exam reveals no gallop and no friction rub.   No murmur heard.  Pulmonary/Chest: Effort normal and breath sounds normal. No respiratory distress. He has no wheezes. He has no rales.   Abdominal: Soft. He exhibits no distension. There is no tenderness (right sided tenderness. neph tube site on right flank clean dry and intact).   Musculoskeletal: He exhibits no edema or tenderness.   Neurological: He is alert and oriented to person, place, and time. No cranial nerve deficit or sensory deficit.   Skin: Skin is dry.   Psychiatric: He has a normal mood and affect.   Nursing note and vitals reviewed.          Significant Labs:   Blood Culture:   No results for input(s): LABBLOO in the last 48 hours.  CBC:   Recent Labs   Lab 02/05/20  0514 02/06/20  0343   WBC 7.64 6.79   HGB 11.5* 11.4*   HCT 35.4* 35.5*    400*     CMP:   Recent Labs   Lab 02/05/20  0514 02/06/20  0343    139   K 3.7 3.5    101   CO2 28 29    108   BUN 5* 9   CREATININE 0.7 0.8   CALCIUM 9.1 8.9   PROT 6.7 6.9   ALBUMIN 2.4* 2.5*   BILITOT 0.5 0.4   ALKPHOS 65 65   AST 17 16   ALT 16 20   ANIONGAP 9 9   EGFRNONAA >60.0 >60.0     Lactic Acid:   No results for input(s): LACTATE in the last 48 hours.  Troponin:   No results for input(s): TROPONINI in the last 48 hours.  Urine Culture:   No results for input(s): LABURIN in the last 48 hours.  Urine Studies:   No results for input(s): COLORU, APPEARANCEUA, PHUR, SPECGRAV, PROTEINUA, GLUCUA, KETONESU, BILIRUBINUA, OCCULTUA, NITRITE, UROBILINOGEN, LEUKOCYTESUR, RBCUA, WBCUA, BACTERIA, SQUAMEPITHEL, HYALINECASTS in  the last 48 hours.    Invalid input(s): YAJAIRA    Significant Imaging: I have reviewed all pertinent imaging results/findings within the past 24 hours.

## 2020-02-06 NOTE — ASSESSMENT & PLAN NOTE
Given history of eczema, allergies, prior staph infections, pneumonia (although patient attributes that to vaping), and now duplicated ureter with pyelonephritis, may consider outpatient referral to immunologist for congenital immunodeficiency workup.    IgE elevated  No congenital facies to corroborate with hyper IgE  Will need further eval when OP   Will need urology follow up for ureterocele puncture  Will need to be weaning off IV pain medication   - Urology/Immunology referral upon DC.

## 2020-02-07 VITALS
BODY MASS INDEX: 27.31 KG/M2 | SYSTOLIC BLOOD PRESSURE: 122 MMHG | HEART RATE: 80 BPM | RESPIRATION RATE: 16 BRPM | HEIGHT: 64 IN | TEMPERATURE: 97 F | WEIGHT: 160 LBS | OXYGEN SATURATION: 97 % | DIASTOLIC BLOOD PRESSURE: 75 MMHG

## 2020-02-07 LAB
ALBUMIN SERPL BCP-MCNC: 2.7 G/DL (ref 3.5–5.2)
ALP SERPL-CCNC: 65 U/L (ref 55–135)
ALT SERPL W/O P-5'-P-CCNC: 23 U/L (ref 10–44)
ANION GAP SERPL CALC-SCNC: 8 MMOL/L (ref 8–16)
AST SERPL-CCNC: 23 U/L (ref 10–40)
BACTERIA BLD CULT: NORMAL
BACTERIA BLD CULT: NORMAL
BASOPHILS # BLD AUTO: 0.05 K/UL (ref 0–0.2)
BASOPHILS NFR BLD: 0.6 % (ref 0–1.9)
BILIRUB SERPL-MCNC: 0.4 MG/DL (ref 0.1–1)
BUN SERPL-MCNC: 6 MG/DL (ref 6–20)
CALCIUM SERPL-MCNC: 9.6 MG/DL (ref 8.7–10.5)
CHLORIDE SERPL-SCNC: 102 MMOL/L (ref 95–110)
CO2 SERPL-SCNC: 28 MMOL/L (ref 23–29)
CREAT SERPL-MCNC: 0.7 MG/DL (ref 0.5–1.4)
DIFFERENTIAL METHOD: ABNORMAL
EOSINOPHIL # BLD AUTO: 1.1 K/UL (ref 0–0.5)
EOSINOPHIL NFR BLD: 13.2 % (ref 0–8)
ERYTHROCYTE [DISTWIDTH] IN BLOOD BY AUTOMATED COUNT: 13.1 % (ref 11.5–14.5)
EST. GFR  (AFRICAN AMERICAN): >60 ML/MIN/1.73 M^2
EST. GFR  (NON AFRICAN AMERICAN): >60 ML/MIN/1.73 M^2
GLUCOSE SERPL-MCNC: 87 MG/DL (ref 70–110)
HCT VFR BLD AUTO: 38.3 % (ref 40–54)
HGB BLD-MCNC: 12.2 G/DL (ref 14–18)
IMM GRANULOCYTES # BLD AUTO: 0.05 K/UL (ref 0–0.04)
IMM GRANULOCYTES NFR BLD AUTO: 0.6 % (ref 0–0.5)
LYMPHOCYTES # BLD AUTO: 1.6 K/UL (ref 1–4.8)
LYMPHOCYTES NFR BLD: 20.2 % (ref 18–48)
MAGNESIUM SERPL-MCNC: 1.7 MG/DL (ref 1.6–2.6)
MCH RBC QN AUTO: 28.4 PG (ref 27–31)
MCHC RBC AUTO-ENTMCNC: 31.9 G/DL (ref 32–36)
MCV RBC AUTO: 89 FL (ref 82–98)
MONOCYTES # BLD AUTO: 0.6 K/UL (ref 0.3–1)
MONOCYTES NFR BLD: 7.7 % (ref 4–15)
NEUTROPHILS # BLD AUTO: 4.7 K/UL (ref 1.8–7.7)
NEUTROPHILS NFR BLD: 57.7 % (ref 38–73)
NRBC BLD-RTO: 0 /100 WBC
PLATELET # BLD AUTO: 452 K/UL (ref 150–350)
PMV BLD AUTO: 8.8 FL (ref 9.2–12.9)
POTASSIUM SERPL-SCNC: 3.9 MMOL/L (ref 3.5–5.1)
PROT SERPL-MCNC: 7.3 G/DL (ref 6–8.4)
RBC # BLD AUTO: 4.29 M/UL (ref 4.6–6.2)
SODIUM SERPL-SCNC: 138 MMOL/L (ref 136–145)
WBC # BLD AUTO: 8.08 K/UL (ref 3.9–12.7)

## 2020-02-07 PROCEDURE — 99238 PR HOSPITAL DISCHARGE DAY,<30 MIN: ICD-10-PCS | Mod: ,,, | Performed by: HOSPITALIST

## 2020-02-07 PROCEDURE — 80053 COMPREHEN METABOLIC PANEL: CPT

## 2020-02-07 PROCEDURE — 99238 HOSP IP/OBS DSCHRG MGMT 30/<: CPT | Mod: ,,, | Performed by: HOSPITALIST

## 2020-02-07 PROCEDURE — 85025 COMPLETE CBC W/AUTO DIFF WBC: CPT

## 2020-02-07 PROCEDURE — 83735 ASSAY OF MAGNESIUM: CPT

## 2020-02-07 PROCEDURE — 36415 COLL VENOUS BLD VENIPUNCTURE: CPT

## 2020-02-07 PROCEDURE — 25000003 PHARM REV CODE 250: Performed by: STUDENT IN AN ORGANIZED HEALTH CARE EDUCATION/TRAINING PROGRAM

## 2020-02-07 RX ORDER — OXYCODONE HYDROCHLORIDE 10 MG/1
10 TABLET ORAL EVERY 6 HOURS PRN
Qty: 20 TABLET | Refills: 0 | Status: SHIPPED | OUTPATIENT
Start: 2020-02-07 | End: 2020-02-12

## 2020-02-07 RX ORDER — METHOCARBAMOL 500 MG/1
500 TABLET, FILM COATED ORAL 4 TIMES DAILY
Qty: 40 TABLET | Refills: 0 | Status: SHIPPED | OUTPATIENT
Start: 2020-02-07 | End: 2020-02-17

## 2020-02-07 RX ADMIN — GABAPENTIN 300 MG: 300 CAPSULE ORAL at 09:02

## 2020-02-07 RX ADMIN — ACETAMINOPHEN 650 MG: 325 TABLET ORAL at 05:02

## 2020-02-07 RX ADMIN — OXYCODONE HYDROCHLORIDE 10 MG: 10 TABLET ORAL at 04:02

## 2020-02-07 RX ADMIN — OXYCODONE HYDROCHLORIDE 10 MG: 10 TABLET ORAL at 12:02

## 2020-02-07 RX ADMIN — CEFDINIR 300 MG: 125 POWDER, FOR SUSPENSION ORAL at 09:02

## 2020-02-07 RX ADMIN — METHOCARBAMOL TABLETS 500 MG: 500 TABLET, COATED ORAL at 09:02

## 2020-02-07 NOTE — PLAN OF CARE
Pt AAOx4 and VSS. Pt is progressing with plan of care. Free of skin breakdown as the pt positioned/repositioned well independently. Clean, dry, and intact dressing noted on R flank. Nephrostomy tube in place and care performed. Incentive spirometer at bedside and pt instructed on its use. Pain controlled well with PRN meds. Frequent rounds made to assess pain and safety and no complaints at this time noted. Side rails up x 2. Bed locked. Call light within reach. No falls noted. Will continue to monitor.

## 2020-02-07 NOTE — DISCHARGE SUMMARY
[facility] Hospital Medicine  Discharge Summary      Patient Name: Fernie Rice  MRN: [id number]  Admission Date: 2/2/2020  Hospital Length of Stay: 5 days  Discharge Date and Time:  02/07/2020 1:26 PM  Attending Physician: No att. providers found   Discharging Provider: Radha Sanon MD  Primary Care Provider: Fernie Garcia MD  [facility] Medicine Team: [facility] HOSP MED 3 Radha Sanon MD    HPI:   Patient is a 19 M with PMH asthma, eczema (takes dupilumab), recurrent staph infections 2/2 itching his eczema on his arms, pneumonia 2/2 requiring a prior admission to the hospital, and ADHD (per the chart) who presented to [facility] ED for right sided abdominal pain and urinary burning.    Patient states that he had trauma to his right hand requiring screw placement by ortho about 1 month ago. About 2 weeks ago, he started noticing progressively worsening abdominal pain. He then started to have urinary burning and the abdominal pain became unbearable. He came to the ED on 2/2 for evaluation. He was febrile to 103 and tachycardic to the 180s (EKG sinus tach), SBP in 100s-110s. Septic workup initiated with UA, CXR, labs and lactic. White count elevated to 14, lactic 2.7, creatinine 1 (up from 0.7 at baseline), electrolytes normal, UA showing nitrites, >100 WBCs, and many bacteria, and CT A/P with contrast that showed duplicated R ureteral system with hydronephrosis. He was given 2L NS and ceftriaxone for complicated UTI in the ED and urology consulted. Urology recommended IR nephrostomy tube placement to R upper and lower poles. He went to IR and had neph tubes placed without complication.     He is admitted to 3 for further evaluation for complicated UTI. Urology following along.    * No surgery found *      Hospital Course:   Patient with duplicated ureteral system here for complicated UTI requiring placement of 2 R nephrostomy tubes admitted to IM3. Continuing ceftriaxone, all cultures remain no growth to  date. Tachycardia, febrile periods and pain continues at nephrostomy tube site. Urology following and will plan for ureterocele puncture when OP. Added robaxin, gabapentin and scheduled tylenol to assist with pain control. Abx adjusted as patient remains culture negative, continue cefdinir. Patient DC with referral to Urology.   Of note: patient with recurrent infections- with history of severe asthma and eczema.  Note increasing peripheral eosinophils on CBC; no allergic symptoms at this time.  Pt does not meet criteria for hyper IgE syndrome, nor selective IgA deficiency.  HIV and RPR negative this admission.  Pt would benefit from further outpatient w/u with Allergy/ Immunology, referral placed.Immunology.        Vitals:    02/06/20 2315 02/07/20 0402 02/07/20 0732 02/07/20 1123   BP: 130/75 118/79 119/67 122/75   BP Location: Left arm Left arm     Patient Position: Lying Lying     Pulse: 68 75 66 80   Resp: 16 14 16    Temp: 96.3 °F (35.7 °C) 97.8 °F (36.6 °C)  97.2 °F (36.2 °C)   TempSrc: Oral Oral     SpO2: 97% 96% 96% 97%   Weight:       Height:         Physical Exam  Constitutional: He is oriented to person, place, and time. He appears well-developed and well-nourished. No distress.   HENT:   Head: Atraumatic.   Eyes: Right eye exhibits no discharge. Left eye exhibits no discharge. No scleral icterus.   Neck: Neck supple. No JVD present.   Cardiovascular: Normal rate and regular rhythm. Exam reveals no gallop and no friction rub.   No murmur heard.  Pulmonary/Chest: Effort normal and breath sounds normal. No respiratory distress. He has no wheezes. He has no rales.   Abdominal: Soft. He exhibits no distension. There is no tenderness (right sided tenderness. neph tube site on right flank clean dry and intact).   Musculoskeletal: He exhibits no edema or tenderness.   Neurological: He is alert and oriented to person, place, and time. No cranial nerve deficit or sensory deficit.   Skin: Skin is dry.    Psychiatric: He has a normal mood and affect.   Nursing note and vitals reviewed.  Consults:   Consults (From admission, onward)        Status Ordering Provider     Inpatient consult to Interventional Radiology  Once     Provider:  (Not yet assigned)    Completed KIMI PUGH     Inpatient consult to Urology  Once     Provider:  (Not yet assigned)    Completed ANAHY SERRANO          * Pyelonephritis  Patient with complicated right duplicate ureteral system with hydronephrosis and now pyelonephritis requiring nephrostomy tube placement. Urology following. Patient s/p R nephrostomy tubes to R upper and lower poles on 2/3. Patient with continued tachycardia, but very clear source of infection with UA and hydronephrosis. Micro studies of urine directly from R kidney are pending. Blood cultures pending. Patient empirically started on ceftriaxone. Will try to get his pain under better control as well, as that could also be contributing to tachycardia. His BP is stable in the 130s systolic, currently appears stable for the floor, but low threshold to consult ICU if hypotensive.    PLAN:  - abx de-escalated to cefdinir   - f/u urine culture and sensitivities - all NGTD  - f/u blood cultures - NGTD  - f/u urology recs - urology signed off   - pain control, titrating up on multimodal therapy - patient states in extreme pain, sleeping comfortably when I entered the room today, mother remains at bedside   - monitor creatinine      Nephrostomy status  - patient with significant pain from nephrostomy tube site  - continue oxycodone and dilaudid IV for now  - added robaxin   - will attempt to wean in next 24 hours  - OOB encouraged  - PT/OT     Moderate tetrahydrocannabinol (THC) dependence        Duplicated ureter, right  See pyelonephritis      Discharge planning issues  Given history of eczema, allergies, prior staph infections, pneumonia (although patient attributes that to vaping), and now duplicated ureter  with pyelonephritis, may consider outpatient referral to immunologist for congenital immunodeficiency workup.    IgE elevated  No congenital facies to corroborate with hyper IgE  Will need further eval when OP   Will need urology follow up for ureterocele puncture  Will need to be weaning off IV pain medication   - Urology/Immunology referral upon DC.     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 [Q62.5, N13.8] 02/03/2020 Not Applicable    Discharge planning issues [Z02.9] 02/03/2020 Not Applicable    Duplicated ureter, right [Q62.5] 02/03/2020 Not Applicable    Moderate tetrahydrocannabinol (THC) dependence [F12.20] 02/03/2020 Yes    Closed displaced fracture of base of fifth metacarpal bone of right hand [S62.316A] 01/02/2020 Yes    Tobacco abuse [Z72.0] 11/05/2019 Yes      Problems Resolved During this Admission:    Diagnosis Date Noted Date Resolved POA    TIERA (acute kidney injury) [N17.9] 02/03/2020 02/04/2020 Yes    Sepsis [A41.9] 02/02/2020 02/05/2020 Yes       Discharged Condition: good    Disposition: Home or Self Care    Follow Up:    Patient Instructions:      Ambulatory referral/consult to Urology   Standing Status: Future   Referral Priority: Routine Referral Type: Consultation   Referral Reason: Specialty Services Required   Requested Specialty: Urology   Number of Visits Requested: 1     Ambulatory  referral/consult to Immunology   Standing Status: Future   Referral Priority: Routine Referral Type: Consultation   Referral Reason: Specialty Services Required   Requested Specialty: Immunology   Number of Visits Requested: 1       Significant Diagnostic Studies: Labs:   BMP:   Recent Labs   Lab 02/06/20  0343 02/07/20  0446    87    138   K 3.5 3.9    102   CO2 29 28   BUN 9 6   CREATININE 0.8 0.7   CALCIUM 8.9 9.6   MG 1.9 1.7   , CMP   Recent Labs   Lab 02/06/20  0343 02/07/20  0446    138   K 3.5 3.9    102   CO2 29 28    87   BUN 9 6   CREATININE 0.8 0.7   CALCIUM 8.9 9.6   PROT 6.9 7.3   ALBUMIN 2.5* 2.7*   BILITOT 0.4 0.4   ALKPHOS 65 65   AST 16 23   ALT 20 23   ANIONGAP 9 8   ESTGFRAFRICA >60.0 >60.0   EGFRNONAA >60.0 >60.0    and CBC   Recent Labs   Lab 02/06/20 0343 02/07/20  0446   WBC 6.79 8.08   HGB 11.4* 12.2*   HCT 35.5* 38.3*   * 452*     Microbiology:   Blood Culture   Lab Results   Component Value Date    LABBLOO No Growth to date 02/03/2020    LABBLOO No Growth to date 02/03/2020    LABBLOO No Growth to date 02/03/2020    LABBLOO No Growth to date 02/03/2020     Radiology: CT scan: CT ABDOMEN PELVIS WITH CONTRAST:   Results for orders placed or performed during the hospital encounter of 02/02/20   CT Abdomen Pelvis With Contrast    Narrative    EXAMINATION:  CT ABDOMEN PELVIS WITH CONTRAST    CLINICAL HISTORY:  Infection, abdomen-pelvis;right side abd pain, febrile, tachy, r/o ki stone vs appy vs abscess;    TECHNIQUE:  The patient was surveyed from the lung bases through the pelvis after the administration of 75 cc Omni 350 IV contrast as well as oral contrast. The data was reconstructed for coronal, sagittal, and axial images.    COMPARISON:  Abdominal radiograph 02/02/2020    Chest radiograph 02/02/2020    CT chest 08/17/2019    FINDINGS:  CHEST:    Lungs/Pleura: The lung bases are essentially clear.  Mild dependent atelectasis.  No pleural  effusion is seen.    Heart: The visualized portions of the heart appear normal. No pericardial effusion.    Thoracic soft tissues: Unremarkable    ABDOMEN:    Liver: The liver is normal in size and attenuation.  No focal hepatic abnormality is seen.    Gallbladder/Bile ducts: The gallbladder is unremarkable.  No intrahepatic or extrahepatic biliary ductal dilatation is identified.    Spleen:Spleen is enlarged without focal process seen.    Stomach: Unremarkable    Pancreas: Unremarkable    Adrenals: Unremarkable    Renal/Ureters:    -Left kidney appears normal.    Duplicated collecting system of the right kidney with significant dilation of both ureters.  There appears to be 2 ureteroceles at the insertion of the right ureters.  Mild enhancement of the ureteral wall without significant surrounding inflammatory changes.  The most superior right ureter was partially visualized on CT chest 08/17/2019 and was dilated at that time as well.  There is cortical thinning with cystic change and scattered parenchymal calcifications at the right renal upper pole, likely related to longstanding obstruction of the duplicated ureter to the upper pole.    Within the superior pole of the right kidney there multiple calcifications likely representing nonobstructive renal stones.  These calcifications are partially visualized on CT chest 08/17/2019.  Multiple hypodensities within the superior pole of the right kidney favored to represent simple renal cysts.    Besides the above described ureteroceles, there is no significant abnormality of the bladder.    Bowel: The visualized loops of small and large bowel show no evidence of obstruction or inflammation.  Appendix is visualized and is without evidence of inflammation.    Peritoneum: no ascites, free fluid, or intraperitoneal free air is identified.    Lymph Nodes: there is no evidence of lymph node enlargement in the abdomen or pelvis.    Aorta: The abdominal aorta is normal in  course and caliber without significant atherosclerotic calcifications.    Bones: The osseous structures demonstrate no significant abnormality.    Soft Tissues: the extraperitoneal soft tissues are unremarkable.      Impression    There is a 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  This could represent inflammation of the ureter due to infection or potentially issues with chronic urine stasis.    Ureteroceles at the insertion of the right ureters.    Multiple nonobstructive renal calculi and also nonspecific parenchymal calcifications within the right mid to upper pole.    Multiple hypodensities within the superior pole of the right kidney consistent with simple renal cysts.    Splenomegaly.    Electronically signed by resident: Vasile Montez  Date:    02/02/2020  Time:    17:46    Electronically signed by: Nelson Wright MD  Date:    02/02/2020  Time:    18:13       Pending Diagnostic Studies:     None         Medications:  Reconciled Home Medications:      Medication List      START taking these medications    cefdinir 300 MG capsule  Commonly known as:  OMNICEF  Take 1 capsule (300 mg total) by mouth 2 (two) times daily. for 11 days     methocarbamol 500 MG Tab  Commonly known as:  ROBAXIN  Take 1 tablet (500 mg total) by mouth 4 (four) times daily. for 10 days     oxyCODONE 10 mg Tab immediate release tablet  Commonly known as:  ROXICODONE  Take 1 tablet (10 mg total) by mouth every 6 (six) hours as needed.        CONTINUE taking these medications    albuterol-ipratropium 2.5 mg-0.5 mg/3 mL nebulizer solution  Commonly known as:  DUO-NEB  Take 3 mLs by nebulization every 4 (four) hours as needed for Wheezing or Shortness of Breath. Rescue     Dupixent 300 mg/2 mL Syrg  Generic drug:  dupilumab  Inject into the skin every 14 (fourteen) days.     ibuprofen 800 MG tablet  Commonly known as:   ADVIL,MOTRIN  Take 800 mg by mouth 3 (three) times daily as needed for Pain.     ProAir HFA 90 mcg/actuation inhaler  Generic drug:  albuterol  Inhale 1-2 puffs into the lungs every 6 (six) hours as needed for Wheezing or Shortness of Breath. Rescue        STOP taking these medications    acyclovir 200 mg/5 mL suspension  Commonly known as:  ZOVIRAX            Indwelling Lines/Drains at time of discharge:   Lines/Drains/Airways     Drain                 Nephrostomy 02/02/20 2252 Right 8 Fr. 4 days         Nephrostomy 02/02/20 2252 Right 8 Fr. 4 days                     Radha Sanon MD  Department of Hospital Medicine  [facility]

## 2020-02-07 NOTE — PROGRESS NOTES
[facility] Hospital Medicine  Progress Note    Patient Name: Fernie Rice  MRN: [id number]  Patient Class: IP- Inpatient   Admission Date: 2/2/2020  Length of Stay: 5 days  Attending Physician: Destiny Connelly MD  Primary Care Provider: Fernie Garcia MD    [facility] Hospital Medicine Team: [facility]OSP MED 3 Radha Sanon MD    Subjective:     Principal Problem:Pyelonephritis        HPI:  Patient is a 19 M with PMH asthma, eczema (takes dupilumab), recurrent staph infections 2/2 itching his eczema on his arms, pneumonia 2/2 requiring a prior admission to the hospital, and ADHD (per the chart) who presented to [facility] ED for right sided abdominal pain and urinary burning.    Patient states that he had trauma to his right hand requiring screw placement by ortho about 1 month ago. About 2 weeks ago, he started noticing progressively worsening abdominal pain. He then started to have urinary burning and the abdominal pain became unbearable. He came to the ED on 2/2 for evaluation. He was febrile to 103 and tachycardic to the 180s (EKG sinus tach), SBP in 100s-110s. Septic workup initiated with UA, CXR, labs and lactic. White count elevated to 14, lactic 2.7, creatinine 1 (up from 0.7 at baseline), electrolytes normal, UA showing nitrites, >100 WBCs, and many bacteria, and CT A/P with contrast that showed duplicated R ureteral system with hydronephrosis. He was given 2L NS and ceftriaxone for complicated UTI in the ED and urology consulted. Urology recommended IR nephrostomy tube placement to R upper and lower poles. He went to IR and had neph tubes placed without complication.     [facility] for complicated UTI. Urology following along.    Overview/Hospital Course:  Patient with duplicated ureteral system here for complicated UTI requiring placement of 2 R nephrostomy tubes admitted to 3. Continuing ceftriaxone, all cultures remain no growth to date. Tachycardia, febrile periods  and pain continues at nephrostomy tube site. Urology following and will plan for ureterocele puncture when OP. Added robaxin, gabapentin and scheduled tylenol to assist with pain control. Abx adjusted as patient remains culture negative, continue cefdinir. Patient DC with referral to Urology.   Of note: patient with recurrent infections- with history of severe asthma and eczema.  Note increasing peripheral eosinophils on CBC; no allergic symptoms at this time.  Pt does not meet criteria for hyper IgE syndrome, nor selective IgA deficiency.  HIV and RPR negative this admission.  Pt would benefit from further outpatient w/u with Allergy/ Immunology, referral placed.Immunology.     No new subjective & objective note has been filed under this hospital service since the last note was generated.    Vitals:    02/06/20 1937 02/06/20 2315 02/07/20 0402 02/07/20 0732   BP: 113/66 130/75 118/79 119/67   BP Location: Right arm Left arm Left arm    Patient Position: Lying Lying Lying    Pulse: 75 68 75 66   Resp: 18 16 14 16   Temp: 97.5 °F (36.4 °C) 96.3 °F (35.7 °C) 97.8 °F (36.6 °C)    TempSrc: Oral Oral Oral    SpO2: 95% 97% 96% 96%   Weight:       Height:         Physical Exam  Constitutional: He is oriented to person, place, and time. He appears well-developed and well-nourished. No distress.   HENT:   Head: Atraumatic.   Eyes: Right eye exhibits no discharge. Left eye exhibits no discharge. No scleral icterus.   Neck: Neck supple. No JVD present.   Cardiovascular: Normal rate and regular rhythm. Exam reveals no gallop and no friction rub.   No murmur heard.  Pulmonary/Chest: Effort normal and breath sounds normal. No respiratory distress. He has no wheezes. He has no rales.   Abdominal: Soft. He exhibits no distension. There is no tenderness (right sided tenderness. neph tube site on right flank clean dry and intact).   Musculoskeletal: He exhibits no edema or tenderness.   Neurological: He is alert and oriented to  person, place, and time. No cranial nerve deficit or sensory deficit.   Skin: Skin is dry.   Psychiatric: He has a normal mood and affect.   Nursing note and vitals reviewed.    Assessment/Plan:      * Pyelonephritis  Patient with complicated right duplicate ureteral system with hydronephrosis and now pyelonephritis requiring nephrostomy tube placement. Urology following. Patient s/p R nephrostomy tubes to R upper and lower poles on 2/3. Patient with continued tachycardia, but very clear source of infection with UA and hydronephrosis. Micro studies of urine directly from R kidney are pending. Blood cultures pending. Patient empirically started on ceftriaxone. Will try to get his pain under better control as well, as that could also be contributing to tachycardia. His BP is stable in the 130s systolic, currently appears stable for the floor, but low threshold to consult ICU if hypotensive.    PLAN:  - abx de-escalated to cefdinir   - f/u urine culture and sensitivities - all NGTD  - f/u blood cultures - NGTD  - f/u urology recs - urology signed off   - pain control, titrating up on multimodal therapy - patient states in extreme pain, sleeping comfortably when I entered the room today, mother remains at bedside   - monitor creatinine      Nephrostomy status  - patient with significant pain from nephrostomy tube site  - continue oxycodone and dilaudid IV for now  - added robaxin   - will attempt to wean in next 24 hours  - OOB encouraged  - PT/OT     Moderate tetrahydrocannabinol (THC) dependence        Duplicated ureter, right  See pyelonephritis      Discharge planning issues  Given history of eczema, allergies, prior staph infections, pneumonia (although patient attributes that to vaping), and now duplicated ureter with pyelonephritis, may consider outpatient referral to immunologist for congenital immunodeficiency workup.    IgE elevated  No congenital facies to corroborate with hyper IgE  Will need further eval  when OP   Will need urology follow up for ureterocele puncture  Will need to be weaning off IV pain medication   - Urology/Immunology referral upon DC.     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

## 2020-02-08 ENCOUNTER — NURSE TRIAGE (OUTPATIENT)
Dept: ADMINISTRATIVE | Facility: CLINIC | Age: 20
End: 2020-02-08

## 2020-02-08 LAB
BACTERIA BLD CULT: NORMAL
BACTERIA BLD CULT: NORMAL

## 2020-02-08 NOTE — TELEPHONE ENCOUNTER
Reason for Disposition   [1] SEVERE post-op pain (e.g., excruciating, pain scale 8-10) AND [2] not controlled with pain medications    Additional Information   Negative: Sounds like a life-threatening emergency to the triager   Negative: Chest pain   Negative: Difficulty breathing   Negative: Surgical incision symptoms and questions   Negative: [1] Discomfort (pain, burning or stinging) when passing urine AND [2] male   Negative: [1] Discomfort (pain, burning or stinging) when passing urine AND [2] female   Negative: Constipation   Negative: New or worsening leg (calf, thigh) pain   Negative: New or worsening leg swelling   Negative: Dizziness is severe, or persists > 24 hours after surgery   Negative: Pain, redness, swelling, or pus at IV Site   Negative: Symptoms arising from use of a urinary catheter (Edmond or Coude)   Negative: Cast problems or questions   Negative: Medication question   Negative: [1] Widespread rash AND [2] bright red, sunburn-like   Negative: [1] Severe headache AND [2] after spinal (epidural) anesthesia   Negative: [1] Vomiting AND [2] persists > 4 hours   Negative: [1] Vomiting AND [2] abdomen looks much more swollen than usual   Negative: [1] Drinking very little AND [2] dehydration suspected (e.g., no urine > 12 hours, very dry mouth, very lightheaded)   Negative: Patient sounds very sick or weak to the triager   Negative: Sounds like a serious complication to the triager   Negative: Fever > 100.5 F (38.1 C)    Protocols used: POST-OP SYMPTOMS AND [id number]

## 2020-02-10 LAB — BACTERIA SPEC ANAEROBE CULT: NORMAL

## 2020-02-10 NOTE — PLAN OF CARE
Patient discharged home to care of family on 2/7/20.     02/10/20 1639   Final Note   Assessment Type Final Discharge Note   Anticipated Discharge Disposition Home   What phone number can be called within the next 1-3 days to see how you are doing after discharge?   ([phone])   Hospital Follow Up  Appt(s) scheduled? Yes   Discharge plans and expectations educations in teach back method with documentation complete? Yes   Right Care Referral Info   Post Acute Recommendation No Care

## 2020-02-11 ENCOUNTER — PATIENT OUTREACH (OUTPATIENT)
Dept: ADMINISTRATIVE | Facility: CLINIC | Age: 20
End: 2020-02-11

## 2020-02-11 NOTE — PROGRESS NOTES
C3 nurse attempted to contact patient. The following occurred:   C3 nurse attempted to contact Fernie Rice, spoke with mother Megan for a TCC post hospital discharge follow up call. Unable to conduct the call @ this time. The patient requested a callback tomorrow 02/12 AM or she will try to call back this afternoon.    The patient does not have a scheduled HOSFU appointment within 7-14 days post hospital discharge date 02/07/20. Non-[facility] PCP

## 2020-02-11 NOTE — PROGRESS NOTES
C3 nurse attempted to contact patient. No answer. The following message was left for the patient to return the call:  Good morning I am a nurse calling on behalf of [facility] from the [facility].  This is a Transitional Care Call for Fernie Rice . When you have a moment please contact us at [phone] or [phone] Monday through Friday, between the hours of 8 am to 4 pm. We look forward to speaking with you. On behalf of [facility] have a nice day.    The patient does not have a scheduled HOSFU appointment within 7-14 days post hospital discharge date 02/07/2020. Patient has a scheduled procedure for 02/14/20. Non [facility] PCP

## 2020-02-12 ENCOUNTER — TELEPHONE (OUTPATIENT)
Dept: UROLOGY | Facility: HOSPITAL | Age: 20
End: 2020-02-12

## 2020-02-12 RX ORDER — KETOROLAC TROMETHAMINE 10 MG/1
10 TABLET, FILM COATED ORAL EVERY 6 HOURS PRN
Qty: 20 TABLET | Refills: 0 | Status: SHIPPED | OUTPATIENT
Start: 2020-02-12 | End: 2020-02-19

## 2020-02-12 RX ORDER — OXYCODONE HYDROCHLORIDE 10 MG/1
10 TABLET ORAL EVERY 6 HOURS PRN
Qty: 7 TABLET | Refills: 0 | Status: SHIPPED | OUTPATIENT
Start: 2020-02-12 | End: 2020-02-21

## 2020-02-12 NOTE — PATIENT INSTRUCTIONS
Discharge Instructions for Percutaneous Nephrostomy  You had a procedure called percutaneous nephrostomy. This means that urine was drained from your kidney to prevent pain, infection, and kidney damage. You had the procedure because your kidney or the tube leading from the kidney to the bladder (ureter) was blocked by a kidney stone or tumor, or perhaps due to another problem. The blockage caused a backup of urine in your kidney.  A thin, flexible tube called a catheter will stay in place until the problem that caused the buildup of urine has been treated. This may be as soon as a day or as long as weeks to months. The catheter bag is taped to your leg so that you can walk around.  Activity  · Dont lift anything heavier than 10 pounds until your healthcare provider says its OK.  · Avoid strenuous activities, such as mowing the lawn, vacuuming, playing sports, or engaging in anything that will cause your tubing to be pulled or moved.  · Slowly increase your activity level with short, frequent walks 3 to 4 times a day.  · Dont drive while you are still taking pain medicine. Wait until your healthcare provider says its OK to drive.  Home care  · Eat your normal diet.  · Drink 6 to 8 glasses of water a day, unless directed otherwise.  · Wear loose, comfortable clothes that wont pull or kink the catheter tube.  · Check your dressing often to make sure the tubing is secure.  · Dont let the drainage bag hang freely, or it will pull on the catheter. Keep it taped to your leg or hold it temporarily.  · Empty the drainage bag often to keep the weight of the bag from pulling on the catheter.  ¨ Empty the bag when it is one-half to two-thirds full.  ¨ Always empty the bag before you go to bed.  ¨ Wash your hands before and after emptying the bag.  · Measure and record the amount and color of the urine in the bag.  · Gently clean the skin around the catheter with mild soap and warm water. Pat dry with a clean  towel.  · Change your dressing if it becomes loose or dirty.  · Throw away the dressing in a plastic bag.  · If you were asked to stop any medicines before the surgery, be sure to ask the healthcare provider when you may restart taking them. This is especially important in the case of blood thinners (anticoagulants or antiplatelet medicines).  Follow-up care  Make a follow-up appointment as directed by our staff.     When to call your healthcare provider  Call your healthcare provider right away if you have any of these:  · A catheter that is not draining  · The catheter comes out. Do not try to put it back in.  · Pain, redness, or discharge around catheter  · Fever of 100.4°F (38°C) or higher, or as directed by your healthcare provider  · A noticeable increase or decrease in the amount of urine that drains  · Cloudy or smelly urine  · Urine that changes to a pink or red color  · Increased pain  · Severe pain in your side  · Nausea and vomiting   Date Last Reviewed: 2/1/2017  © 0987-8270 [employer]. [address] [place] [address], [place] [address]. All rights reserved. This information is not intended as a substitute for professional medical care. Always follow your healthcare professional's instructions.

## 2020-02-12 NOTE — TELEPHONE ENCOUNTER
Spoke to mother and prescribed toradol as well as small amount of oxycodone.    ----- Message from Wyatt Hemphill LPN sent at 2/12/2020  3:57 PM CST -----  Contact: mom of pt.:  Megan     tel:   [phone]       ----- Message -----  From: Diana Hwang  Sent: 2/12/2020   3:53 PM CST  To: [facility] Staff    Caller says she was told to call  Your office as per the on-call nurse.    Pt. Is  In a lot of pain and only has a few pain pills left.   Has a kidney infection .     Pt. Is due to  Have surgery on Friday w/ you.      Pls call to discuss this today asap.

## 2020-02-13 ENCOUNTER — TELEPHONE (OUTPATIENT)
Dept: UROLOGY | Facility: CLINIC | Age: 20
End: 2020-02-13

## 2020-02-13 RX ORDER — ONDANSETRON HYDROCHLORIDE 8 MG/1
TABLET, FILM COATED ORAL
COMMUNITY
Start: 2020-01-16

## 2020-02-13 RX ORDER — ONDANSETRON 4 MG/1
TABLET, FILM COATED ORAL
COMMUNITY
Start: 2020-01-13

## 2020-02-13 RX ORDER — OSELTAMIVIR PHOSPHATE 75 MG/1
CAPSULE ORAL
Status: ON HOLD | COMMUNITY
Start: 2019-12-20 | End: 2020-03-02

## 2020-02-13 NOTE — PRE-PROCEDURE INSTRUCTIONS
PreOp Instructions given to pt's Mother - Megan Rice:     - Verbal medication information (what to hold and what to take)   - NPO guidelines   - Arrival place directions given; time to be given the day before procedure by the   Surgeon's Office   - Bathing with antibacterial soap   - Don't wear any jewelry or bring any valuables AM of surgery   - No makeup or moisturizer to face   - No perfume/cologne, powder, lotions or aftershave   pt's Mother - Megan Moralesw verbalized understanding.   pt's Mother - Megan Moralesw denies any h/o Anesthesia/Sedation complications or side effects.    Arrival to [facility]    pt's Mother - Megan Moralesw WILL BE PROVIDING TRANSPORTATION HOME UPON DISCHARGE.

## 2020-02-13 NOTE — TELEPHONE ENCOUNTER
Called pt to confirm arrival time of 515a for procedure on 2/14/2020. Gave pt NPO instructions and gave pt opportunity to ask questions. Pt verbalized understanding.

## 2020-02-14 ENCOUNTER — HOSPITAL ENCOUNTER (OUTPATIENT)
Facility: HOSPITAL | Age: 20
Discharge: HOME | End: 2020-02-14
Attending: UROLOGY | Admitting: UROLOGY
Payer: COMMERCIAL

## 2020-02-14 ENCOUNTER — ANESTHESIA (OUTPATIENT)
Dept: SURGERY | Facility: HOSPITAL | Age: 20
End: 2020-02-14
Payer: COMMERCIAL

## 2020-02-14 ENCOUNTER — ANESTHESIA EVENT (OUTPATIENT)
Dept: SURGERY | Facility: HOSPITAL | Age: 20
End: 2020-02-14
Payer: COMMERCIAL

## 2020-02-14 VITALS
OXYGEN SATURATION: 97 % | BODY MASS INDEX: 23.49 KG/M2 | HEART RATE: 75 BPM | HEIGHT: 68 IN | RESPIRATION RATE: 15 BRPM | SYSTOLIC BLOOD PRESSURE: 130 MMHG | TEMPERATURE: 98 F | WEIGHT: 155 LBS | DIASTOLIC BLOOD PRESSURE: 75 MMHG

## 2020-02-14 DIAGNOSIS — N28.9 KIDNEY DISORDER: ICD-10-CM

## 2020-02-14 DIAGNOSIS — Q62.5: Primary | ICD-10-CM

## 2020-02-14 DIAGNOSIS — N28.89 URETEROCELE: ICD-10-CM

## 2020-02-14 DIAGNOSIS — N13.8: Primary | ICD-10-CM

## 2020-02-14 DIAGNOSIS — R31.9 HEMATURIA, UNSPECIFIED TYPE: ICD-10-CM

## 2020-02-14 PROCEDURE — 74420 UROGRAPHY RTRGR +-KUB: CPT | Mod: 26,,, | Performed by: UROLOGY

## 2020-02-14 PROCEDURE — 63600175 PHARM REV CODE 636 W HCPCS: Performed by: STUDENT IN AN ORGANIZED HEALTH CARE EDUCATION/TRAINING PROGRAM

## 2020-02-14 PROCEDURE — 74420 PR  X-RAY RETROGRADE PYELOGRAM: ICD-10-PCS | Mod: 26,,, | Performed by: UROLOGY

## 2020-02-14 PROCEDURE — D9220A PRA ANESTHESIA: Mod: CRNA,,, | Performed by: NURSE ANESTHETIST, CERTIFIED REGISTERED

## 2020-02-14 PROCEDURE — 63600175 PHARM REV CODE 636 W HCPCS: Performed by: ANESTHESIOLOGY

## 2020-02-14 PROCEDURE — D9220A PRA ANESTHESIA: ICD-10-PCS | Mod: ANES,,, | Performed by: ANESTHESIOLOGY

## 2020-02-14 PROCEDURE — 36000706: Performed by: UROLOGY

## 2020-02-14 PROCEDURE — 63600175 PHARM REV CODE 636 W HCPCS: Performed by: NURSE ANESTHETIST, CERTIFIED REGISTERED

## 2020-02-14 PROCEDURE — 52005 CYSTO W/URTRL CATHJ: CPT | Mod: ,,, | Performed by: UROLOGY

## 2020-02-14 PROCEDURE — 52005 PR CYSTOURETHROSCOPY,URETER CATHETER: ICD-10-PCS | Mod: ,,, | Performed by: UROLOGY

## 2020-02-14 PROCEDURE — 25500020 PHARM REV CODE 255: Performed by: UROLOGY

## 2020-02-14 PROCEDURE — C1758 CATHETER, URETERAL: HCPCS | Performed by: UROLOGY

## 2020-02-14 PROCEDURE — 36000707: Performed by: UROLOGY

## 2020-02-14 PROCEDURE — 25000003 PHARM REV CODE 250: Performed by: STUDENT IN AN ORGANIZED HEALTH CARE EDUCATION/TRAINING PROGRAM

## 2020-02-14 PROCEDURE — 25000003 PHARM REV CODE 250: Performed by: NURSE ANESTHETIST, CERTIFIED REGISTERED

## 2020-02-14 PROCEDURE — D9220A PRA ANESTHESIA: Mod: ANES,,, | Performed by: ANESTHESIOLOGY

## 2020-02-14 PROCEDURE — D9220A PRA ANESTHESIA: ICD-10-PCS | Mod: CRNA,,, | Performed by: NURSE ANESTHETIST, CERTIFIED REGISTERED

## 2020-02-14 PROCEDURE — 37000008 HC ANESTHESIA 1ST 15 MINUTES: Performed by: UROLOGY

## 2020-02-14 PROCEDURE — 71000015 HC POSTOP RECOV 1ST HR: Performed by: UROLOGY

## 2020-02-14 PROCEDURE — 50431 PR INJECTION PX NEPHROSTOGRAM &/ URETEROGRAM, EXISTING ACCESS, INCL GUID, S&I: ICD-10-PCS | Mod: 51,RT,, | Performed by: UROLOGY

## 2020-02-14 PROCEDURE — C1769 GUIDE WIRE: HCPCS | Performed by: UROLOGY

## 2020-02-14 PROCEDURE — 50431 NJX PX NFROSGRM &/URTRGRM: CPT | Mod: 51,RT,, | Performed by: UROLOGY

## 2020-02-14 PROCEDURE — 71000044 HC DOSC ROUTINE RECOVERY FIRST HOUR: Performed by: UROLOGY

## 2020-02-14 PROCEDURE — 37000009 HC ANESTHESIA EA ADD 15 MINS: Performed by: UROLOGY

## 2020-02-14 RX ORDER — ONDANSETRON 2 MG/ML
INJECTION INTRAMUSCULAR; INTRAVENOUS
Status: DISCONTINUED | OUTPATIENT
Start: 2020-02-14 | End: 2020-02-14

## 2020-02-14 RX ORDER — OXYCODONE HYDROCHLORIDE 5 MG/1
5 TABLET ORAL ONCE
Status: COMPLETED | OUTPATIENT
Start: 2020-02-14 | End: 2020-02-14

## 2020-02-14 RX ORDER — CEFAZOLIN SODIUM 1 G/3ML
INJECTION, POWDER, FOR SOLUTION INTRAMUSCULAR; INTRAVENOUS
Status: DISCONTINUED
Start: 2020-02-14 | End: 2020-02-14 | Stop reason: WASHOUT

## 2020-02-14 RX ORDER — CEFAZOLIN SODIUM 1 G/3ML
2 INJECTION, POWDER, FOR SOLUTION INTRAMUSCULAR; INTRAVENOUS ONCE
Status: COMPLETED | OUTPATIENT
Start: 2020-02-14 | End: 2020-02-14

## 2020-02-14 RX ORDER — LIDOCAINE HYDROCHLORIDE 10 MG/ML
1 INJECTION, SOLUTION EPIDURAL; INFILTRATION; INTRACAUDAL; PERINEURAL ONCE
Status: DISCONTINUED | OUTPATIENT
Start: 2020-02-14 | End: 2020-02-14 | Stop reason: HOSPADM

## 2020-02-14 RX ORDER — GLYCOPYRROLATE 0.2 MG/ML
INJECTION INTRAMUSCULAR; INTRAVENOUS
Status: DISCONTINUED | OUTPATIENT
Start: 2020-02-14 | End: 2020-02-14

## 2020-02-14 RX ORDER — SODIUM CHLORIDE 9 MG/ML
INJECTION, SOLUTION INTRAVENOUS CONTINUOUS
Status: DISCONTINUED | OUTPATIENT
Start: 2020-02-14 | End: 2020-02-14 | Stop reason: HOSPADM

## 2020-02-14 RX ORDER — PROPOFOL 10 MG/ML
VIAL (ML) INTRAVENOUS
Status: DISCONTINUED | OUTPATIENT
Start: 2020-02-14 | End: 2020-02-14

## 2020-02-14 RX ORDER — ONDANSETRON 2 MG/ML
4 INJECTION INTRAMUSCULAR; INTRAVENOUS DAILY PRN
Status: DISCONTINUED | OUTPATIENT
Start: 2020-02-14 | End: 2020-02-14 | Stop reason: HOSPADM

## 2020-02-14 RX ORDER — LIDOCAINE HCL/PF 100 MG/5ML
SYRINGE (ML) INTRAVENOUS
Status: DISCONTINUED | OUTPATIENT
Start: 2020-02-14 | End: 2020-02-14

## 2020-02-14 RX ORDER — OXYCODONE HYDROCHLORIDE 5 MG/1
TABLET ORAL
Status: DISCONTINUED
Start: 2020-02-14 | End: 2020-02-14 | Stop reason: HOSPADM

## 2020-02-14 RX ORDER — SODIUM CHLORIDE 0.9 % (FLUSH) 0.9 %
10 SYRINGE (ML) INJECTION
Status: DISCONTINUED | OUTPATIENT
Start: 2020-02-14 | End: 2020-02-14 | Stop reason: HOSPADM

## 2020-02-14 RX ORDER — NEOSTIGMINE METHYLSULFATE 0.5 MG/ML
INJECTION, SOLUTION INTRAVENOUS
Status: DISCONTINUED | OUTPATIENT
Start: 2020-02-14 | End: 2020-02-14

## 2020-02-14 RX ORDER — DEXMEDETOMIDINE HYDROCHLORIDE 100 UG/ML
INJECTION, SOLUTION INTRAVENOUS
Status: COMPLETED
Start: 2020-02-14 | End: 2020-02-14

## 2020-02-14 RX ORDER — DEXAMETHASONE SODIUM PHOSPHATE 4 MG/ML
INJECTION, SOLUTION INTRA-ARTICULAR; INTRALESIONAL; INTRAMUSCULAR; INTRAVENOUS; SOFT TISSUE
Status: DISCONTINUED | OUTPATIENT
Start: 2020-02-14 | End: 2020-02-14

## 2020-02-14 RX ORDER — FENTANYL CITRATE 50 UG/ML
INJECTION, SOLUTION INTRAMUSCULAR; INTRAVENOUS
Status: DISCONTINUED | OUTPATIENT
Start: 2020-02-14 | End: 2020-02-14

## 2020-02-14 RX ORDER — FENTANYL CITRATE 50 UG/ML
25 INJECTION, SOLUTION INTRAMUSCULAR; INTRAVENOUS EVERY 5 MIN PRN
Status: COMPLETED | OUTPATIENT
Start: 2020-02-14 | End: 2020-02-14

## 2020-02-14 RX ORDER — FENTANYL CITRATE 50 UG/ML
INJECTION, SOLUTION INTRAMUSCULAR; INTRAVENOUS
Status: DISCONTINUED
Start: 2020-02-14 | End: 2020-02-14 | Stop reason: WASHOUT

## 2020-02-14 RX ORDER — ROCURONIUM BROMIDE 10 MG/ML
INJECTION, SOLUTION INTRAVENOUS
Status: DISCONTINUED | OUTPATIENT
Start: 2020-02-14 | End: 2020-02-14

## 2020-02-14 RX ORDER — DEXMEDETOMIDINE HYDROCHLORIDE 100 UG/ML
INJECTION, SOLUTION INTRAVENOUS
Status: DISCONTINUED | OUTPATIENT
Start: 2020-02-14 | End: 2020-02-14

## 2020-02-14 RX ORDER — MIDAZOLAM HYDROCHLORIDE 1 MG/ML
INJECTION, SOLUTION INTRAMUSCULAR; INTRAVENOUS
Status: DISCONTINUED | OUTPATIENT
Start: 2020-02-14 | End: 2020-02-14

## 2020-02-14 RX ORDER — MIDAZOLAM HYDROCHLORIDE 1 MG/ML
INJECTION INTRAMUSCULAR; INTRAVENOUS
Status: DISCONTINUED
Start: 2020-02-14 | End: 2020-02-14 | Stop reason: WASHOUT

## 2020-02-14 RX ADMIN — ROCURONIUM BROMIDE 40 MG: 10 INJECTION, SOLUTION INTRAVENOUS at 09:02

## 2020-02-14 RX ADMIN — CEFAZOLIN 2 G: 330 INJECTION, POWDER, FOR SOLUTION INTRAMUSCULAR; INTRAVENOUS at 09:02

## 2020-02-14 RX ADMIN — ONDANSETRON 4 MG: 2 INJECTION INTRAMUSCULAR; INTRAVENOUS at 09:02

## 2020-02-14 RX ADMIN — DEXAMETHASONE SODIUM PHOSPHATE 8 MG: 4 INJECTION, SOLUTION INTRAMUSCULAR; INTRAVENOUS at 09:02

## 2020-02-14 RX ADMIN — FENTANYL CITRATE 25 MCG: 50 INJECTION INTRAMUSCULAR; INTRAVENOUS at 10:02

## 2020-02-14 RX ADMIN — MIDAZOLAM HYDROCHLORIDE 2 MG: 1 INJECTION, SOLUTION INTRAMUSCULAR; INTRAVENOUS at 09:02

## 2020-02-14 RX ADMIN — DEXMEDETOMIDINE HYDROCHLORIDE 4 MCG: 100 INJECTION, SOLUTION, CONCENTRATE INTRAVENOUS at 09:02

## 2020-02-14 RX ADMIN — SODIUM CHLORIDE: 0.9 INJECTION, SOLUTION INTRAVENOUS at 06:02

## 2020-02-14 RX ADMIN — GLYCOPYRROLATE 0.6 MG: 0.2 INJECTION, SOLUTION INTRAMUSCULAR; INTRAVENOUS at 10:02

## 2020-02-14 RX ADMIN — OXYCODONE HYDROCHLORIDE 5 MG: 5 TABLET ORAL at 11:02

## 2020-02-14 RX ADMIN — LIDOCAINE HYDROCHLORIDE 50 MG: 20 INJECTION, SOLUTION INTRAVENOUS at 09:02

## 2020-02-14 RX ADMIN — NEOSTIGMINE METHYLSULFATE 5 MG: 0.5 INJECTION INTRAVENOUS at 10:02

## 2020-02-14 RX ADMIN — PROPOFOL 200 MG: 10 INJECTION, EMULSION INTRAVENOUS at 09:02

## 2020-02-14 RX ADMIN — FENTANYL CITRATE 25 MCG: 50 INJECTION INTRAMUSCULAR; INTRAVENOUS at 11:02

## 2020-02-14 RX ADMIN — FENTANYL CITRATE 100 MCG: 50 INJECTION, SOLUTION INTRAMUSCULAR; INTRAVENOUS at 09:02

## 2020-02-14 RX ADMIN — SODIUM CHLORIDE, SODIUM GLUCONATE, SODIUM ACETATE, POTASSIUM CHLORIDE, MAGNESIUM CHLORIDE, SODIUM PHOSPHATE, DIBASIC, AND POTASSIUM PHOSPHATE: .53; .5; .37; .037; .03; .012; .00082 INJECTION, SOLUTION INTRAVENOUS at 10:02

## 2020-02-14 NOTE — PROGRESS NOTES
Dr walden and resident at bedside explaining procedure to pt. Pt agreed to have surgery. Consent signed by pt.

## 2020-02-14 NOTE — TRANSFER OF CARE
"Anesthesia Transfer of Care Note    Patient: Fernie Rice    Procedure(s) Performed: Procedure(s) (LRB):  CYSTOSCOPY (N/A)  PYELOGRAM, RETROGRADE (Right)  NEPHROSTOGRAM, ANTEGRADE (Right)  REMOVAL, NEPHROSTOMY TUBE (Right)  CYSTOGRAM (N/A)    Patient location: PACU    Anesthesia Type: general    Transport from OR: Transported from OR on 6-10 L/min O2 by face mask with adequate spontaneous ventilation    Post pain: adequate analgesia    Post assessment: no apparent anesthetic complications and tolerated procedure well    Post vital signs: stable    Level of consciousness: awake and alert    Nausea/Vomiting: no nausea/vomiting    Complications: none    Transfer of care protocol was followed      Last vitals:   Visit Vitals  /77 (BP Location: Left arm, Patient Position: Lying)   Pulse 68   Temp 37.1 °C (98.8 °F) (Oral)   Resp 18   Ht 5' 8" (1.727 m)   Wt 70.3 kg (155 lb)   SpO2 100%   BMI 23.57 kg/m²     "

## 2020-02-14 NOTE — ANESTHESIA PREPROCEDURE EVALUATION
02/14/2020  Fernie Rice is a 19 y.o., male.    Anesthesia Evaluation    I have reviewed the Patient Summary Reports.    I have reviewed the Nursing Notes.   I have reviewed the Medications.     Review of Systems  Anesthesia Hx:  No problems with previous Anesthesia  Denies Family Hx of Anesthesia complications.    Cardiovascular:  Cardiovascular Normal Exercise tolerance: good     Pulmonary:  Pulmonary Normal    Renal/:   renal calculi    Neurological:  Neurology Normal    Psych:   Psychiatric History ADHD       Past Medical History:   Diagnosis Date    ADHD (attention deficit hyperactivity disorder)     Asthma     Eczema     Esophagitis     Food impaction of esophagus     Multiple food allergies     Vernal keratoconjunctivitis      Past Surgical History:   Procedure Laterality Date    ADENOIDECTOMY      CLOSED REDUCTION OF INJURY OF HAND WITH PERCUTANEOUS PINNING Right 1/2/2020    Procedure: CLOSED REDUCTION, HAND, WITH PERCUTANEOUS PINNING;  Surgeon: Chace Jerez MD;  Location: [facility];  Service: Orthopedics;  Laterality: Right;    EYE SURGERY      ulcers removed    SINUS SURGERY       Patient Active Problem List   Diagnosis    Esophageal obstruction    Impetigo    Eczema herpeticum    Pneumonia due to infectious organism    Acute midline thoracic back pain    Keratoconus, stable, right eye    Tobacco abuse    Closed displaced fracture of base of fifth metacarpal bone of right hand    Pyelonephritis    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    Discharge planning issues    Duplicated ureter, right    Moderate tetrahydrocannabinol (THC) dependence    Nephrostomy status    Ureterocele         Physical Exam  General:  Well nourished    Airway/Jaw/Neck:  Airway Findings: Mouth Opening: Normal General Airway Assessment: Adult  Mallampati: II  TM  Distance: Normal, at least 6 cm  Jaw/Neck Findings:  Neck ROM: Normal ROM  Neck Findings:     Eyes/Ears/Nose:  Eyes/Ears/Nose Findings:    Dental:  Dental Findings: In tact   Chest/Lungs:  Chest/Lungs Findings: Normal Respiratory Rate     Heart/Vascular:  Heart Findings: Rate: Normal        Mental Status:  Mental Status Findings:  Cooperative, Alert and Oriented       Wt Readings from Last 3 Encounters:   02/14/20 70.3 kg (155 lb)   02/02/20 72.6 kg (160 lb)   01/22/20 68 kg (150 lb)     Temp Readings from Last 3 Encounters:   02/14/20 37.1 °C (98.8 °F) (Oral)   02/07/20 36.2 °C (97.2 °F)   01/22/20 36.1 °C (97 °F) (Oral)     BP Readings from Last 3 Encounters:   02/14/20 133/77   02/07/20 122/75   01/22/20 130/84     Pulse Readings from Last 3 Encounters:   02/14/20 68   02/07/20 80   01/22/20 97     Lab Results   Component Value Date    WBC 8.08 02/07/2020    HGB 12.2 (L) 02/07/2020    HCT 38.3 (L) 02/07/2020    MCV 89 02/07/2020     (H) 02/07/2020         Chemistry        Component Value Date/Time     02/07/2020 0446    K 3.9 02/07/2020 0446     02/07/2020 0446    CO2 28 02/07/2020 0446    BUN 6 02/07/2020 0446    CREATININE 0.7 02/07/2020 0446    GLU 87 02/07/2020 0446        Component Value Date/Time    CALCIUM 9.6 02/07/2020 0446    ALKPHOS 65 02/07/2020 0446    AST 23 02/07/2020 0446    ALT 23 02/07/2020 0446    BILITOT 0.4 02/07/2020 0446    ESTGFRAFRICA >60.0 02/07/2020 0446    EGFRNONAA >60.0 02/07/2020 0446        Results for orders placed or performed during the hospital encounter of 02/02/20   EKG 12-lead    Collection Time: 02/03/20 12:31 AM    Narrative    Test Reason : I49.9,    Vent. Rate : 131 BPM     Atrial Rate : 131 BPM     P-R Int : 134 ms          QRS Dur : 102 ms      QT Int : 306 ms       P-R-T Axes : 014 002 016 degrees     QTc Int : 451 ms    Sinus tachycardia  Incomplete right bundle branch block  Minimal voltage criteria for LVH, may be normal variant  Abnormal  ECG  When compared with ECG of 02-FEB-2020 14:20,  ST no longer depressed in Inferior leads    Confirmed by Dc Fontana MD ([id number]) on 2/3/2020 10:30:15 PM    Referred By: [email or web address]   SELF           Confirmed By:Dc Fontana MD       Anesthesia Plan  Type of Anesthesia, risks & benefits discussed:  Anesthesia Type:  general, MAC  Patient's Preference:   Intra-op Monitoring Plan: standard ASA monitors  Intra-op Monitoring Plan Comments:   Post Op Pain Control Plan: per primary service following discharge from PACU, IV/PO Opioids PRN and multimodal analgesia  Post Op Pain Control Plan Comments:   Induction:   IV  Beta Blocker:         Informed Consent: Patient understands risks and agrees with Anesthesia plan.  Questions answered. Anesthesia consent signed with patient.  ASA Score: 2     Day of Surgery Review of History & Physical:    H&P update referred to the surgeon.         Ready For Surgery From Anesthesia Perspective.

## 2020-02-14 NOTE — ANESTHESIA POSTPROCEDURE EVALUATION
Anesthesia Post Evaluation    Patient: Fernie Rice    Procedure(s) Performed: Procedure(s) (LRB):  CYSTOSCOPY (N/A)  PYELOGRAM, RETROGRADE (Right)  NEPHROSTOGRAM, ANTEGRADE (Right)  REMOVAL, NEPHROSTOMY TUBE (Right)  CYSTOGRAM (N/A)    Final Anesthesia Type: general    Patient location during evaluation: PACU  Patient participation: Yes- Able to Participate  Level of consciousness: awake and alert  Post-procedure vital signs: reviewed and stable  Pain management: adequate  Airway patency: patent    PONV status at discharge: No PONV  Anesthetic complications: no      Cardiovascular status: blood pressure returned to baseline  Respiratory status: unassisted  Hydration status: euvolemic  Follow-up not needed.          Vitals Value Taken Time   /75 2/14/2020 11:25 AM   Temp 36.9 °C (98.4 °F) 2/14/2020 11:25 AM   Pulse 72 2/14/2020 11:25 AM   Resp 15 2/14/2020 11:25 AM   SpO2 97 % 2/14/2020 11:25 AM   Vitals shown include unvalidated device data.      No case tracking events are documented in the log.      Pain/Mayur Score: Pain Rating Prior to Med Admin: 8 (2/14/2020 11:30 AM)  Pain Rating Post Med Admin: 5 (2/14/2020 11:30 AM)  Mayur Score: 10 (2/14/2020 11:34 AM)  Modified Mayur Score: 20 (2/14/2020 11:34 AM)

## 2020-02-14 NOTE — DISCHARGE SUMMARY
[facility]  Discharge Note  Short Stay    Admit Date: 2/14/2020    Discharge Date and Time: 02/14/2020 10:30 AM      Attending Physician: Chace Prado MD     Discharge Provider: Fernie Macedo MD    Diagnoses:  Active Hospital Problems    Diagnosis  POA    *Ureterocele [N28.89]  Yes      Resolved Hospital Problems   No resolved problems to display.       Discharged Condition: good    Hospital Course: Patient was admitted for cystoscopy with right retrograde pyelogram, right antegrade nephrostogram, and cystogram and tolerated the procedure well with no complications. The patient was discharged home in good condition on the same day.       Final Diagnoses: Same as principal problem.    Disposition: Home or Self Care    Follow up/Patient Instructions:    Medications:  Reconciled Home Medications:   Current Discharge Medication List      CONTINUE these medications which have NOT CHANGED    Details   cefdinir (OMNICEF) 300 MG capsule Take 1 capsule (300 mg total) by mouth 2 (two) times daily. for 11 days  Qty: 22 capsule, Refills: 0      ibuprofen (ADVIL,MOTRIN) 800 MG tablet Take 800 mg by mouth 3 (three) times daily as needed for Pain.      ketorolac (TORADOL) 10 mg tablet Take 1 tablet (10 mg total) by mouth every 6 (six) hours as needed for Pain.  Qty: 20 tablet, Refills: 0      methocarbamol (ROBAXIN) 500 MG Tab Take 1 tablet (500 mg total) by mouth 4 (four) times daily. for 10 days  Qty: 40 tablet, Refills: 0      oxyCODONE (ROXICODONE) 10 mg Tab immediate release tablet Take 1 tablet (10 mg total) by mouth every 6 (six) hours as needed.  Qty: 7 tablet, Refills: 0    Comments: Quantity prescribed more than 7 day supply? No      PROAIR HFA 90 mcg/actuation inhaler Inhale 1-2 puffs into the lungs every 6 (six) hours as needed for Wheezing or Shortness of Breath. Rescue  Qty: 18 g, Refills: 0      albuterol-ipratropium (DUO-NEB) 2.5 mg-0.5 mg/3 mL nebulizer solution Take 3 mLs by nebulization  every 4 (four) hours as needed for Wheezing or Shortness of Breath. Rescue  Qty: 180 mL, Refills: 0      dupilumab (DUPIXENT) 300 mg/2 mL Syrg Inject into the skin every 14 (fourteen) days.       !! ondansetron (ZOFRAN) 4 MG tablet       !! ondansetron (ZOFRAN) 8 MG tablet TK 1 T PO Q 12 H.      oseltamivir (TAMIFLU) 75 MG capsule        !! - Potential duplicate medications found. Please discuss with provider.        Discharge Procedure Orders   NM Renal Scan with LASIX   Standing Status: Future Standing Exp. Date: 02/14/21   Order Comments: Clamp nephrostomy tube during the study and unclamp afterward     Order Specific Question Answer Comments   May the Radiologist modify the order per protocol to meet the clinical needs of the patient? Yes      Diet Adult Regular     Notify your health care provider if you experience any of the following:  temperature >100.4     Notify your health care provider if you experience any of the following:  persistent nausea and vomiting or diarrhea     Notify your health care provider if you experience any of the following:  severe uncontrolled pain     Notify your health care provider if you experience any of the following:  difficulty breathing or increased cough     Notify your health care provider if you experience any of the following:  severe persistent headache     Notify your health care provider if you experience any of the following:  worsening rash     Notify your health care provider if you experience any of the following:  persistent dizziness, light-headedness, or visual disturbances     Notify your health care provider if you experience any of the following:  increased confusion or weakness     Activity as tolerated     Follow-up Information     Chace Prado MD In 2 weeks.    Specialty:  Urology  Why:  Urology follow-up with Mag-3 scan before.  Contact information:  [address]  [facility] [address] [phone]

## 2020-02-14 NOTE — DISCHARGE INSTRUCTIONS
Cystoscopy    Cystoscopy is a procedure that lets your doctor look directly inside your urethra and bladder. It can be used to:  · Help diagnose a problem with your urethra, bladder, or kidneys.  · Take a sample (biopsy) of bladder or urethral tissue.  · Treat certain problems (such as removing kidney stones).  · Place a stent to bypass an obstruction.  · Take special X-rays of the kidneys.  Based on the findings, your doctor may recommend other tests or treatments.  What is a cystoscope?  A cystoscope is a telescope-like instrument that contains lenses and fiberoptics (small glass wires that make bright light). The cystoscope may be straight and rigid, or flexible to bend around curves in the urethra. The doctor may look directly into the cystoscope, or project the image onto a monitor.  Getting ready  · Ask your doctor if you should stop taking any medicines before the procedure.  · Ask whether you should avoid eating or drinking anything after midnight before the procedure.  · Follow any other instructions your doctor gives you.  Tell your doctor before the exam if you:  · Take any medicines, such as aspirin or blood thinners  · Have allergies to any medicines  · Are pregnant   The procedure  Cystoscopy is done in the doctors office, surgery center, or hospital. The doctor and a nurse are present during the procedure. It takes only a few minutes, longer if a biopsy, X-ray, or treatment needs to be done.  During the procedure:  · You lie on an exam table on your back, knees bent and legs apart. You are covered with a drape.  · Your urethra and the area around it are washed. Anesthetic jelly may be applied to numb the urethra. Other pain medicine is usually not needed. In some cases, you may be offered a mild sedative to help you relax. If a more extensive procedure is to be done, such as a biopsy or kidney stone removal, general anesthesia may be needed.  · The cystoscope is inserted. A sterile fluid is put  into the bladder to expand it. You may feel pressure from this fluid.  · When the procedure is done, the cystoscope is removed.  After the procedure  If you had a sedative, general anesthesia, or spinal anesthesia, you must have someone drive you home. Once youre home:  · Drink plenty of fluids.  · You may have burning or light bleeding when you urinate--this is normal.  · Medicines may be prescribed to ease any discomfort or prevent infection. Take these as directed.  · Call your doctor if you have heavy bleeding or blood clots, burning that lasts more than a day, a fever over 100°F  (38° C), or trouble urinating.  Date Last Reviewed: 1/1/2017  © 3208-1013 [employer]. [address] [place] [address], [place] [address]. All rights reserved. This information is not intended as a substitute for professional medical care. Always follow your healthcare professional's instructions.

## 2020-02-14 NOTE — PLAN OF CARE
"Pt prepped for sx, vss, nad, mother at bedside. When nurse asked pt what procedure he was having performed today pt stated "nkechi surgery" when informing pt of procedure he stated "I do not want a stent" nurse informed pt Dr will come speak to him. When asked if pt would allow a blood transfusion if needed, pt replied "NO." Dr anderson called and nurse informed Dr of pt statements. Dr. Kay replied someone would be by to pre op and address the situation.  "

## 2020-02-14 NOTE — OP NOTE
[facility]  Operative Note    Date: 02/14/2020    Pre-Op Diagnosis:   1. Duplicated right collecting system  2. Ureteral obstruction    Patient Active Problem List    Diagnosis Date Noted    Ureterocele 02/14/2020    Nephrostomy status 02/04/2020    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 02/03/2020    Discharge planning issues 02/03/2020    Duplicated ureter, right 02/03/2020    Moderate tetrahydrocannabinol (THC) dependence 02/03/2020    Pyelonephritis 02/02/2020    Closed displaced fracture of base of fifth metacarpal bone of right hand 01/02/2020    Tobacco abuse 11/05/2019    Keratoconus, stable, right eye 08/21/2019    Acute midline thoracic back pain 08/16/2019    Pneumonia due to infectious organism 08/15/2019    Eczema herpeticum 06/15/2016    Impetigo 06/14/2016    Esophageal obstruction 11/22/2014     Post-Op Diagnosis:  1. Duplicated right collecting system  2. Ureteral obstruction of right upper pole moiety  3. Ectopic insertion of ureter draining right upper pole moiety    Procedure(s) Performed:   1.  Cystoscopy with right retrograde pyelogram  2.  Right antegrade nephrostogram  3.  Cystogram  4.  Fluoroscopy < 1 hour    Specimen(s): none    Staff Surgeon: Chace Prado MD    Assistant Surgeon: Fernie Macedo MD; Guzman Zelaya MD    Anesthesia: General endotracheal anesthesia    Indications: Fernie Rice is a 19 y.o. male who was recently admitted for right-sided pyelonephritis and concern for ureteral obstruction in the setting of a complete right-sided duplicated system. He presents today for diagnostic work-up and possible therapeutic intervention.    Findings:   - Right retrograde pyelogram performed of ureter draining right lower pole moiety which showed delicate calices with no evidence of hydronephrosis, no filling defects, and ureter normal in course and caliber. Contrast briskly drained from the upper pole moiety and  ureter.  - On antegrade nephrostogram of upper pole moiety, the ureter was dilated and tortuous. It appeared to insert distal to the bladder neck.  - Cystogram performed which showed no vesicoureteral reflux of right lower pole ureter. Bladder spanned past pelvic brim. Anesthetic bladder capacity was 1100 cc.    Estimated Blood Loss: min    Drains: Right upper pole nephrostomy tube    Procedure in Detail:  After risks, benefits and possible complications of the procedure were explained, the patient elected to undergo the procedure and informed consent was obtained. All questions were answered in the alix-operative area. The patient was transferred to the cystoscopy suite and placed in the supine position.  SCDs were applied and working.  Anesthesia was administered.  Once adequately sedated, the patient was placed in the dorsal lithotomy position and prepped and draped in the usual sterile fashion.  Time out was performed, alix-procedural antibiotics were confirmed.     A rigid cystoscope in a 22 Fr sheath was introduced into the bladder per urethra. This passed easily. The entire urethra was visualized which showed no masses or strictures.  The right and left ureteral orifices were identified in the normal anatomic position.  Formal cystoscopy was performed which revealed no masses or lesions suspicious for malignancy, no trabeculations, no bladder stones and no bladder diverticula.     A 5 Fr open-ended cone tipped ureteral catheter was inserted per the right ureteral orifice and a this turned out to be the insertion of the ureter draining the right lower pole moiety. RPG showed delicate calices with no evidence of hydronephrosis, no filling defects, and ureter normal in course and caliber. Contrast drained briskly from the lower pole moiety and ureter. A right antegrade nephrostogram was performed via the upper pole moiety nephrostomy tube which revealed a dilated and tortuous ureter. The ureter appeared to  insert distal to the bladder neck. The patient's nephrostomy tube draining his lower pole moiety was removed. Due to concern for obstruction, we kept the nephrostomy tube draining the upper pole moiety in place.    A 16 Fr two-way rondon was inserted with 10 cc in the balloon and a cystogram was performed which showed no vesicoureteral reflux of right lower pole ureter. The bladder spanned past the pelvic brim. Anesthetic bladder capacity was 1100 cc. The bladder was drained, the rondon removed, and the patient was removed from lithotomy.     The patient tolerated the procedure well and was transferred to recovery in stable condition.    Disposition:  The patient will follow up with Dr. Prado in 2 weeks with a MAG-3 scan before in order to determine differential function between the upper and lower moeities of his right kidney.    Fernie Macedo MD

## 2020-02-14 NOTE — PLAN OF CARE
Pt stable, tolerating po liquids.  Pt states pain is tolerable. Discharge instructions given to mother.  Ready for discharge.

## 2020-02-14 NOTE — ANESTHESIA PROCEDURE NOTES
Intubation  Performed by: Luz De La Cruz CRNA  Authorized by: Bel Martinze MD     Intubation:     Induction:  Intravenous    Intubated:  Postinduction    Mask Ventilation:  Easy with oral airway    Attempts:  1    Attempted By:  CRNA    Method of Intubation:  Direct    Blade:  Grimaldo 2    Laryngeal View Grade: Grade I - full view of chords      Difficult Airway Encountered?: No      Complications:  None    Airway Device:  Oral endotracheal tube    Airway Device Size:  7.5    Style/Cuff Inflation:  Cuffed (inflated to minimal occlusive pressure)    Inflation Amount (mL):  2    Tube secured:  21    Secured at:  The lips    Placement Verified By:  Capnometry    Complicating Factors:  None    Findings Post-Intubation:  BS equal bilateral and atraumatic/condition of teeth unchanged

## 2020-02-14 NOTE — ADDENDUM NOTE
Addendum  created 02/14/20 1208 by Luz De La Cruz, CRNA    Intraprocedure LDAs edited, LDA properties accepted

## 2020-02-14 NOTE — PROGRESS NOTES
Dr steele notified of pt drinking Gatorade ending at 550 am. Stated pt would have to be postponed until  two hours after consumption. Notified Dr Steele of pt stating in the event of an emergency he did not want to accept blood. Blood refusal form on pt chart

## 2020-02-14 NOTE — INTERVAL H&P NOTE
The patient has been examined and the H&P has been reviewed:    I concur with the findings and no changes have occurred since H&P was written.    Both right sided nephrostomy tubes draining well.   Consented for cystoscopy, retrograde pyelogram, right ureterocele puncture.   Urine dip: negative for all components.   Anesthesia/Surgery risks, benefits and alternative options discussed and understood by patient/family.          Active Hospital Problems    Diagnosis  POA    Ureterocele [N28.89]  Yes      Resolved Hospital Problems   No resolved problems to display.

## 2020-02-17 ENCOUNTER — HOSPITAL ENCOUNTER (OUTPATIENT)
Dept: RADIOLOGY | Facility: HOSPITAL | Age: 20
Discharge: HOME | End: 2020-02-17
Payer: COMMERCIAL

## 2020-02-17 DIAGNOSIS — N13.8: ICD-10-CM

## 2020-02-17 DIAGNOSIS — Q62.5: ICD-10-CM

## 2020-02-17 PROCEDURE — 78708 K FLOW/FUNCT IMAGE W/DRUG: CPT | Mod: 26,,, | Performed by: RADIOLOGY

## 2020-02-17 PROCEDURE — 78708 NM KIDNEY W FLOW AND FUNCTION W PHARMACOLOGICAL: ICD-10-PCS | Mod: 26,,, | Performed by: RADIOLOGY

## 2020-02-17 PROCEDURE — A9562 TC99M MERTIATIDE: HCPCS

## 2020-02-21 ENCOUNTER — OFFICE VISIT (OUTPATIENT)
Dept: UROLOGY | Facility: CLINIC | Age: 20
End: 2020-02-21
Payer: COMMERCIAL

## 2020-02-21 VITALS
BODY MASS INDEX: 24.06 KG/M2 | HEART RATE: 112 BPM | DIASTOLIC BLOOD PRESSURE: 86 MMHG | SYSTOLIC BLOOD PRESSURE: 127 MMHG | HEIGHT: 68 IN | WEIGHT: 158.75 LBS

## 2020-02-21 DIAGNOSIS — Q62.5: Primary | ICD-10-CM

## 2020-02-21 DIAGNOSIS — Q62.5 DUPLICATED RIGHT RENAL COLLECTING SYSTEM: ICD-10-CM

## 2020-02-21 DIAGNOSIS — N13.8: Primary | ICD-10-CM

## 2020-02-21 PROCEDURE — 3008F BODY MASS INDEX DOCD: CPT | Mod: CPTII,S$GLB,, | Performed by: UROLOGY

## 2020-02-21 PROCEDURE — 99214 PR OFFICE/OUTPT VISIT, EST, LEVL IV, 30-39 MIN: ICD-10-PCS | Mod: S$GLB,,, | Performed by: UROLOGY

## 2020-02-21 PROCEDURE — 3008F PR BODY MASS INDEX (BMI) DOCUMENTED: ICD-10-PCS | Mod: CPTII,S$GLB,, | Performed by: UROLOGY

## 2020-02-21 PROCEDURE — 87086 URINE CULTURE/COLONY COUNT: CPT

## 2020-02-21 PROCEDURE — 99999 PR PBB SHADOW E&M-EST. PATIENT-LVL III: CPT | Mod: PBBFAC,,,

## 2020-02-21 PROCEDURE — 99214 OFFICE O/P EST MOD 30 MIN: CPT | Mod: S$GLB,,, | Performed by: UROLOGY

## 2020-02-21 PROCEDURE — 99999 PR PBB SHADOW E&M-EST. PATIENT-LVL III: ICD-10-PCS | Mod: PBBFAC,,,

## 2020-02-21 RX ORDER — OXYCODONE HYDROCHLORIDE 5 MG/1
5 CAPSULE ORAL EVERY 6 HOURS PRN
Qty: 15 CAPSULE | Refills: 0 | Status: SHIPPED | OUTPATIENT
Start: 2020-02-21 | End: 2020-02-28 | Stop reason: SDUPTHER

## 2020-02-21 RX ORDER — ACETAMINOPHEN 500 MG
500 TABLET ORAL EVERY 6 HOURS
Qty: 60 TABLET | Refills: 1 | Status: ON HOLD | OUTPATIENT
Start: 2020-02-21 | End: 2020-03-03 | Stop reason: HOSPADM

## 2020-02-21 NOTE — PROGRESS NOTES
Urology - [facility]  Resident Clinic  Staff - Elijah Davila    SUBJECTIVE:     Chief Complaint: history of pyelonephritis in duplicated R collecting system.    History of Present Illness:  Fernie Rice is a 19 y.o. 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     He was admitted to [facility] 2/2/2020 due to flank pain coupled with subjective fevers and shaking chills. In the ED on 2/2/2020, he was febrile to 103 and tachycardic to 141. Clean catch UA was nitrite positive. 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 Because of his difficult anatomy, poor clinic picture we deferred to IR placing nephrostomy tube in both right upper and lower poles. Urine culture from nephrostomy tubes were negative for growth but gram stain showed GPCs and GNRs.    He was treated with antibiotics and returned for cystoscopy with RGP. Right RGP showed lower pole moity draining well and his lower pole nephrostomy tube was removed. His right upper pole UO was thought to be at or near the verumontanum but could not be cannulated successfully for RGP. On antegrade nephrostogram of upper pole moiety, the ureter was dilated and tortuous. It appeared to insert distal to the bladder neck. Cystogram showed bladder capacity of 1100cc.     A NM MAG 3 scan was obtained on 2/17 showing upper moiety of the right kidney appearing to be nonfunctional, however there is considerable parenchyma on the upper pole on the CT.     Today he is doing well. His upper pole nephrostomy tube output has been approximately 200-400cc/day    Review of patient's allergies indicates:   Allergen Reactions    Fish containing products Other (See Comments)    Peanut Anaphylaxis    Amoxicillin Hives     Tolerates ceftriaxone and cefepime    Bactrim [sulfamethoxazole-trimethoprim] Hives    Corn containing products Other (See  Comments)    Soy fiber     Soy flour Dermatitis       Past Medical History:   Diagnosis Date    ADHD (attention deficit hyperactivity disorder)     Asthma     Eczema     Esophagitis     Food impaction of esophagus     Multiple food allergies     Vernal keratoconjunctivitis      Past Surgical History:   Procedure Laterality Date    ADENOIDECTOMY      ANTEGRADE NEPHROSTOGRAPHY Right 2/14/2020    Procedure: NEPHROSTOGRAM, ANTEGRADE;  Surgeon: Chace Prado MD;  Location: [address];  Service: Urology;  Laterality: Right;    CLOSED REDUCTION OF INJURY OF HAND WITH PERCUTANEOUS PINNING Right 1/2/2020    Procedure: CLOSED REDUCTION, HAND, WITH PERCUTANEOUS PINNING;  Surgeon: Chace Jerez MD;  Location: [facility];  Service: Orthopedics;  Laterality: Right;    CYSTOGRAM N/A 2/14/2020    Procedure: CYSTOGRAM;  Surgeon: Chace Prado MD;  Location: [address];  Service: Urology;  Laterality: N/A;    CYSTOSCOPY N/A 2/14/2020    Procedure: CYSTOSCOPY;  Surgeon: Chace Prado MD;  Location: [address];  Service: Urology;  Laterality: N/A;  75 min    EYE SURGERY      ulcers removed    REPLACEMENT OF NEPHROSTOMY TUBE Right 2/14/2020    Procedure: REMOVAL, NEPHROSTOMY TUBE;  Surgeon: Chace Prado MD;  Location: [address];  Service: Urology;  Laterality: Right;    RETROGRADE PYELOGRAPHY Right 2/14/2020    Procedure: PYELOGRAM, RETROGRADE;  Surgeon: Chace Prado MD;  Location: [address];  Service: Urology;  Laterality: Right;    SINUS SURGERY       History reviewed. No pertinent family history.  Social History     Tobacco Use    Smoking status: Current Some Day Smoker    Smokeless tobacco: Never Used   Substance Use Topics    Alcohol use: Yes     Alcohol/week: 20.0 standard drinks     Types: 20 Cans of beer per week    Drug use: Yes     Types: Marijuana        Review of Systems   Constitutional: Negative for activity change, appetite change, chills, fever  "and unexpected weight change.   HENT: Negative.    Eyes: Negative.    Respiratory: Negative for chest tightness and shortness of breath.    Cardiovascular: Negative for chest pain.   Gastrointestinal: Negative for abdominal distention, abdominal pain, nausea and vomiting.   Genitourinary: Positive for flank pain (at NT site). Negative for difficulty urinating and dysuria.   Skin: Negative.    Neurological: Negative.    Psychiatric/Behavioral: Negative.        OBJECTIVE:       Estimated body mass index is 24.14 kg/m² as calculated from the following:    Height as of this encounter: 5' 8" (1.727 m).    Weight as of this encounter: 72 kg (158 lb 11.7 oz).    Vital Signs (Most Recent)  Pulse: (!) 112 (02/21/20 1449)  BP: 127/86 (02/21/20 1449)    Physical Exam   Constitutional: He appears well-developed and well-nourished. No distress.   HENT:   Head: Normocephalic and atraumatic.   Eyes: EOM are normal. No scleral icterus.   Cardiovascular: Normal rate and regular rhythm.    Pulmonary/Chest: Effort normal. No respiratory distress.   Abdominal: Soft. He exhibits no distension. There is no tenderness.   Right NT draining clear yellow urine  NT site non-erythematous, non-tender   Musculoskeletal: He exhibits no edema.   Neurological: He is alert.   Skin: Skin is warm and dry.     Psychiatric: He has a normal mood and affect. His behavior is normal.       BMP  Lab Results   Component Value Date     02/07/2020    K 3.9 02/07/2020     02/07/2020    CO2 28 02/07/2020    BUN 6 02/07/2020    CREATININE 0.7 02/07/2020    CALCIUM 9.6 02/07/2020    ANIONGAP 8 02/07/2020    ESTGFRAFRICA >60.0 02/07/2020    EGFRNONAA >60.0 02/07/2020       Lab Results   Component Value Date    WBC 8.08 02/07/2020    HGB 12.2 (L) 02/07/2020    HCT 38.3 (L) 02/07/2020    MCV 89 02/07/2020     (H) 02/07/2020       Imaging:    CT abd/pelvis 2/2/2020:   Normal left kidney.   Duplicated collecting system of the right kidney with " significant dilation of both ureters.  Mild enhancement of the ureteral wall without significant surrounding inflammatory changes. There is cortical thinning with cystic change and scattered parenchymal calcifications at the right renal upper pole, likely related to longstanding obstruction of the duplicated ureter to the upper pole.    Cystoscopy/ fluroscopy 2/14/2020:   right retrograde pyelogram performed of ureter draining right lower pole moiety which showed delicate calices with no evidence of hydronephrosis, no filling defects, and ureter normal in course and caliber. Contrast briskly drained from the upper pole moiety and ureter. On antegrade nephrostogram of upper pole moiety, the ureter was dilated and tortuous. It appeared to insert distal to the bladder neck. Cystogram was performed which showed no vesicoureteral reflux of right lower pole ureter. Bladder spanned past pelvic brim. Anesthetic bladder capacity was 1100 cc.     NM MAG-3 renogram:  DRF (R-L): 40-60%  The upper moiety of the right kidney appears nonfunctional. No significant tracer uptake.      The lower moiety of the right kidney demonstrates nonobstructive hydronephrosis.    ASSESSMENT     1. 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    2. Duplicated right renal collecting system        PLAN:     - Discussed renal anatomy and obstruction of upper pole moiety. We discussed that his issue would not improve without some form of treatment. We discussed heminephrectomy, ureteroureterostomy, ureteral reimplant and chronic NT changes.   - I recommended ureteral reimplantation. There is good output from his nephrostomy tube and there is good parenchyma surrounding the upper pole of his right kidney. I believe it is worth saving the upper pole. He was initially resistant to surgery but ultimately understands the need for treatment and would like to proceed with robotic right ureteral reimplantation. Discussed risks of surgery  and recovery time. He understands he will need to be off of work for a few weeks and will have a stent for 4-6 weeks. We also discussed rondon catheter for a week after surgery.   - Urine from nephrostomy tube sent for culture. Dressing changed.   - Will discuss surgery with Dr. Prado and talk to schedulers to find a date for the procedure    Daniel Villalobos MD     Letter to Fernie Garcia III, MD

## 2020-02-21 NOTE — H&P (VIEW-ONLY)
Urology - [facility]  Resident Clinic  Staff - Elijah Davila    SUBJECTIVE:     Chief Complaint: history of pyelonephritis in duplicated R collecting system.    History of Present Illness:  Fernie Rice is a 19 y.o. 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     He was admitted to [facility] 2/2/2020 due to flank pain coupled with subjective fevers and shaking chills. In the ED on 2/2/2020, he was febrile to 103 and tachycardic to 141. Clean catch UA was nitrite positive. 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 Because of his difficult anatomy, poor clinic picture we deferred to IR placing nephrostomy tube in both right upper and lower poles. Urine culture from nephrostomy tubes were negative for growth but gram stain showed GPCs and GNRs.    He was treated with antibiotics and returned for cystoscopy with RGP. Right RGP showed lower pole moity draining well and his lower pole nephrostomy tube was removed. His right upper pole UO was thought to be at or near the verumontanum but could not be cannulated successfully for RGP. On antegrade nephrostogram of upper pole moiety, the ureter was dilated and tortuous. It appeared to insert distal to the bladder neck. Cystogram showed bladder capacity of 1100cc.     A NM MAG 3 scan was obtained on 2/17 showing upper moiety of the right kidney appearing to be nonfunctional, however there is considerable parenchyma on the upper pole on the CT.     Today he is doing well. His upper pole nephrostomy tube output has been approximately 200-400cc/day    Review of patient's allergies indicates:   Allergen Reactions    Fish containing products Other (See Comments)    Peanut Anaphylaxis    Amoxicillin Hives     Tolerates ceftriaxone and cefepime    Bactrim [sulfamethoxazole-trimethoprim] Hives    Corn containing products Other (See  Comments)    Soy fiber     Soy flour Dermatitis       Past Medical History:   Diagnosis Date    ADHD (attention deficit hyperactivity disorder)     Asthma     Eczema     Esophagitis     Food impaction of esophagus     Multiple food allergies     Vernal keratoconjunctivitis      Past Surgical History:   Procedure Laterality Date    ADENOIDECTOMY      ANTEGRADE NEPHROSTOGRAPHY Right 2/14/2020    Procedure: NEPHROSTOGRAM, ANTEGRADE;  Surgeon: Chace Prado MD;  Location: [address];  Service: Urology;  Laterality: Right;    CLOSED REDUCTION OF INJURY OF HAND WITH PERCUTANEOUS PINNING Right 1/2/2020    Procedure: CLOSED REDUCTION, HAND, WITH PERCUTANEOUS PINNING;  Surgeon: Chace Jerez MD;  Location: [facility];  Service: Orthopedics;  Laterality: Right;    CYSTOGRAM N/A 2/14/2020    Procedure: CYSTOGRAM;  Surgeon: Chace Prado MD;  Location: [address];  Service: Urology;  Laterality: N/A;    CYSTOSCOPY N/A 2/14/2020    Procedure: CYSTOSCOPY;  Surgeon: Chace Prado MD;  Location: [address];  Service: Urology;  Laterality: N/A;  75 min    EYE SURGERY      ulcers removed    REPLACEMENT OF NEPHROSTOMY TUBE Right 2/14/2020    Procedure: REMOVAL, NEPHROSTOMY TUBE;  Surgeon: Chace Prado MD;  Location: [address];  Service: Urology;  Laterality: Right;    RETROGRADE PYELOGRAPHY Right 2/14/2020    Procedure: PYELOGRAM, RETROGRADE;  Surgeon: Chace Prado MD;  Location: [address];  Service: Urology;  Laterality: Right;    SINUS SURGERY       History reviewed. No pertinent family history.  Social History     Tobacco Use    Smoking status: Current Some Day Smoker    Smokeless tobacco: Never Used   Substance Use Topics    Alcohol use: Yes     Alcohol/week: 20.0 standard drinks     Types: 20 Cans of beer per week    Drug use: Yes     Types: Marijuana        Review of Systems   Constitutional: Negative for activity change, appetite change, chills, fever  "and unexpected weight change.   HENT: Negative.    Eyes: Negative.    Respiratory: Negative for chest tightness and shortness of breath.    Cardiovascular: Negative for chest pain.   Gastrointestinal: Negative for abdominal distention, abdominal pain, nausea and vomiting.   Genitourinary: Positive for flank pain (at NT site). Negative for difficulty urinating and dysuria.   Skin: Negative.    Neurological: Negative.    Psychiatric/Behavioral: Negative.        OBJECTIVE:       Estimated body mass index is 24.14 kg/m² as calculated from the following:    Height as of this encounter: 5' 8" (1.727 m).    Weight as of this encounter: 72 kg (158 lb 11.7 oz).    Vital Signs (Most Recent)  Pulse: (!) 112 (02/21/20 1449)  BP: 127/86 (02/21/20 1449)    Physical Exam   Constitutional: He appears well-developed and well-nourished. No distress.   HENT:   Head: Normocephalic and atraumatic.   Eyes: EOM are normal. No scleral icterus.   Cardiovascular: Normal rate and regular rhythm.    Pulmonary/Chest: Effort normal. No respiratory distress.   Abdominal: Soft. He exhibits no distension. There is no tenderness.   Right NT draining clear yellow urine  NT site non-erythematous, non-tender   Musculoskeletal: He exhibits no edema.   Neurological: He is alert.   Skin: Skin is warm and dry.     Psychiatric: He has a normal mood and affect. His behavior is normal.       BMP  Lab Results   Component Value Date     02/07/2020    K 3.9 02/07/2020     02/07/2020    CO2 28 02/07/2020    BUN 6 02/07/2020    CREATININE 0.7 02/07/2020    CALCIUM 9.6 02/07/2020    ANIONGAP 8 02/07/2020    ESTGFRAFRICA >60.0 02/07/2020    EGFRNONAA >60.0 02/07/2020       Lab Results   Component Value Date    WBC 8.08 02/07/2020    HGB 12.2 (L) 02/07/2020    HCT 38.3 (L) 02/07/2020    MCV 89 02/07/2020     (H) 02/07/2020       Imaging:    CT abd/pelvis 2/2/2020:   Normal left kidney.   Duplicated collecting system of the right kidney with " significant dilation of both ureters.  Mild enhancement of the ureteral wall without significant surrounding inflammatory changes. There is cortical thinning with cystic change and scattered parenchymal calcifications at the right renal upper pole, likely related to longstanding obstruction of the duplicated ureter to the upper pole.    Cystoscopy/ fluroscopy 2/14/2020:   right retrograde pyelogram performed of ureter draining right lower pole moiety which showed delicate calices with no evidence of hydronephrosis, no filling defects, and ureter normal in course and caliber. Contrast briskly drained from the upper pole moiety and ureter. On antegrade nephrostogram of upper pole moiety, the ureter was dilated and tortuous. It appeared to insert distal to the bladder neck. Cystogram was performed which showed no vesicoureteral reflux of right lower pole ureter. Bladder spanned past pelvic brim. Anesthetic bladder capacity was 1100 cc.     NM MAG-3 renogram:  DRF (R-L): 40-60%  The upper moiety of the right kidney appears nonfunctional. No significant tracer uptake.      The lower moiety of the right kidney demonstrates nonobstructive hydronephrosis.    ASSESSMENT     1. 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    2. Duplicated right renal collecting system        PLAN:     - Discussed renal anatomy and obstruction of upper pole moiety. We discussed that his issue would not improve without some form of treatment. We discussed heminephrectomy, ureteroureterostomy, ureteral reimplant and chronic NT changes.   - I recommended ureteral reimplantation. There is good output from his nephrostomy tube and there is good parenchyma surrounding the upper pole of his right kidney. I believe it is worth saving the upper pole. He was initially resistant to surgery but ultimately understands the need for treatment and would like to proceed with robotic right ureteral reimplantation. Discussed risks of surgery  and recovery time. He understands he will need to be off of work for a few weeks and will have a stent for 4-6 weeks. We also discussed rondon catheter for a week after surgery.   - Urine from nephrostomy tube sent for culture. Dressing changed.   - Will discuss surgery with Dr. Prado and talk to schedulers to find a date for the procedure    Daniel Villalobos MD     Letter to Fernie Garcia III, MD

## 2020-02-23 LAB — BACTERIA UR CULT: NO GROWTH

## 2020-02-26 ENCOUNTER — TELEPHONE (OUTPATIENT)
Dept: UROLOGY | Facility: CLINIC | Age: 20
End: 2020-02-26

## 2020-02-26 DIAGNOSIS — N13.8: Primary | ICD-10-CM

## 2020-02-26 DIAGNOSIS — Q62.5: Primary | ICD-10-CM

## 2020-02-28 ENCOUNTER — TELEPHONE (OUTPATIENT)
Dept: UROLOGY | Facility: CLINIC | Age: 20
End: 2020-02-28

## 2020-02-28 ENCOUNTER — ANESTHESIA EVENT (OUTPATIENT)
Dept: SURGERY | Facility: HOSPITAL | Age: 20
DRG: 661 | End: 2020-02-28
Payer: COMMERCIAL

## 2020-02-28 DIAGNOSIS — Q62.5: ICD-10-CM

## 2020-02-28 DIAGNOSIS — N13.8: ICD-10-CM

## 2020-02-28 RX ORDER — OXYCODONE HYDROCHLORIDE 5 MG/1
5 CAPSULE ORAL EVERY 6 HOURS PRN
Qty: 10 CAPSULE | Refills: 0 | Status: ON HOLD | OUTPATIENT
Start: 2020-02-28 | End: 2020-03-02

## 2020-02-28 NOTE — TELEPHONE ENCOUNTER
----- Message from Wyatt Hemphill LPN sent at 2/28/2020 12:37 PM CST -----  Contact: Megan (pts mother) [phone]       ----- Message -----  From: Danielle Gonzalez MA  Sent: 2/28/2020  11:59 AM CST  To: Johan Mas Staff                                  Prescription Refill Request  Name of medication and dose oxyCODONE (OXY-IR) 5 mg Cap     Pharmacy  [id number] - [place] - [address] AT [facility] [phone] (Phone)  [phone] (Fax)        Are you completely out of your medication Yes    Additional Information: Megan (pts mother) [phone]

## 2020-02-28 NOTE — ANESTHESIA PREPROCEDURE EVALUATION
02/28/2020  Fernie Rice is a 19 y.o., male.  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   Post Op Pain Control Plan: per primary service following discharge from PACU  Post Op Pain Control Plan Comments: Per primary service.     Induction:   IV  Beta Blocker:  Patient is not currently on a Beta-Blocker (No further documentation required).       Informed Consent: Patient understands risks and agrees with Anesthesia plan.  Questions answered. Anesthesia consent signed with patient.  ASA Score: 2     Day of Surgery Review of History & Physical:    H&P update referred to the surgeon.     Anesthesia Plan Notes: Chart reviewed, patient interviewed and examined.  The plan for general anesthesia was explained.  Questions were answered and the consent was signed.  Davie ELAINE         Ready For Surgery From Anesthesia Perspective.

## 2020-03-02 ENCOUNTER — ANESTHESIA (OUTPATIENT)
Dept: SURGERY | Facility: HOSPITAL | Age: 20
DRG: 661 | End: 2020-03-02
Payer: COMMERCIAL

## 2020-03-02 ENCOUNTER — HOSPITAL ENCOUNTER (INPATIENT)
Facility: HOSPITAL | Age: 20
LOS: 1 days | Discharge: HOME | DRG: 661 | End: 2020-03-03
Attending: UROLOGY | Admitting: UROLOGY
Payer: COMMERCIAL

## 2020-03-02 DIAGNOSIS — F32.0 CURRENT MILD EPISODE OF MAJOR DEPRESSIVE DISORDER WITHOUT PRIOR EPISODE: Primary | ICD-10-CM

## 2020-03-02 DIAGNOSIS — Q62.5: ICD-10-CM

## 2020-03-02 DIAGNOSIS — Q62.63 ECTOPIC URETER: ICD-10-CM

## 2020-03-02 DIAGNOSIS — N13.8: ICD-10-CM

## 2020-03-02 PROBLEM — N12 PYELONEPHRITIS: Status: RESOLVED | Noted: 2020-02-02 | Resolved: 2020-03-02

## 2020-03-02 PROBLEM — N28.89 URETEROCELE: Status: RESOLVED | Noted: 2020-02-14 | Resolved: 2020-03-02

## 2020-03-02 PROBLEM — J18.9 PNEUMONIA DUE TO INFECTIOUS ORGANISM: Status: RESOLVED | Noted: 2019-08-15 | Resolved: 2020-03-02

## 2020-03-02 LAB
ABO + RH BLD: NORMAL
BLD GP AB SCN CELLS X3 SERPL QL: NORMAL

## 2020-03-02 PROCEDURE — 25000003 PHARM REV CODE 250: Performed by: UROLOGY

## 2020-03-02 PROCEDURE — C1769 GUIDE WIRE: HCPCS | Performed by: UROLOGY

## 2020-03-02 PROCEDURE — C2617 STENT, NON-COR, TEM W/O DEL: HCPCS | Performed by: UROLOGY

## 2020-03-02 PROCEDURE — 36000713 HC OR TIME LEV V EA ADD 15 MIN: Performed by: UROLOGY

## 2020-03-02 PROCEDURE — 36000712 HC OR TIME LEV V 1ST 15 MIN: Performed by: UROLOGY

## 2020-03-02 PROCEDURE — 37000009 HC ANESTHESIA EA ADD 15 MINS: Performed by: UROLOGY

## 2020-03-02 PROCEDURE — 71000015 HC POSTOP RECOV 1ST HR: Performed by: UROLOGY

## 2020-03-02 PROCEDURE — 71000033 HC RECOVERY, INTIAL HOUR: Performed by: UROLOGY

## 2020-03-02 PROCEDURE — D9220A PRA ANESTHESIA: Mod: CRNA,,, | Performed by: NURSE ANESTHETIST, CERTIFIED REGISTERED

## 2020-03-02 PROCEDURE — 63600175 PHARM REV CODE 636 W HCPCS: Performed by: STUDENT IN AN ORGANIZED HEALTH CARE EDUCATION/TRAINING PROGRAM

## 2020-03-02 PROCEDURE — 37000008 HC ANESTHESIA 1ST 15 MINUTES: Performed by: UROLOGY

## 2020-03-02 PROCEDURE — 63600175 PHARM REV CODE 636 W HCPCS: Performed by: NURSE ANESTHETIST, CERTIFIED REGISTERED

## 2020-03-02 PROCEDURE — D9220A PRA ANESTHESIA: Mod: ANES,,, | Performed by: ANESTHESIOLOGY

## 2020-03-02 PROCEDURE — 25000003 PHARM REV CODE 250: Performed by: STUDENT IN AN ORGANIZED HEALTH CARE EDUCATION/TRAINING PROGRAM

## 2020-03-02 PROCEDURE — D9220A PRA ANESTHESIA: ICD-10-PCS | Mod: CRNA,,, | Performed by: NURSE ANESTHETIST, CERTIFIED REGISTERED

## 2020-03-02 PROCEDURE — 27201423 OPTIME MED/SURG SUP & DEVICES STERILE SUPPLY: Performed by: UROLOGY

## 2020-03-02 PROCEDURE — 50949: ICD-10-PCS | Mod: ,,, | Performed by: UROLOGY

## 2020-03-02 PROCEDURE — D9220A PRA ANESTHESIA: ICD-10-PCS | Mod: ANES,,, | Performed by: ANESTHESIOLOGY

## 2020-03-02 PROCEDURE — 50949 UNLISTED LAPS PX URETER: CPT | Mod: ,,, | Performed by: UROLOGY

## 2020-03-02 PROCEDURE — 25000003 PHARM REV CODE 250: Performed by: NURSE ANESTHETIST, CERTIFIED REGISTERED

## 2020-03-02 PROCEDURE — 63600175 PHARM REV CODE 636 W HCPCS: Performed by: ANESTHESIOLOGY

## 2020-03-02 PROCEDURE — 94761 N-INVAS EAR/PLS OXIMETRY MLT: CPT

## 2020-03-02 PROCEDURE — 86901 BLOOD TYPING SEROLOGIC RH(D): CPT

## 2020-03-02 PROCEDURE — 71000039 HC RECOVERY, EACH ADD'L HOUR: Performed by: UROLOGY

## 2020-03-02 DEVICE — STENT URETERAL UNIV 6FR 24CM: Type: IMPLANTABLE DEVICE | Site: URETER | Status: FUNCTIONAL

## 2020-03-02 RX ORDER — BUPIVACAINE HYDROCHLORIDE 2.5 MG/ML
INJECTION, SOLUTION EPIDURAL; INFILTRATION; INTRACAUDAL
Status: DISCONTINUED | OUTPATIENT
Start: 2020-03-02 | End: 2020-03-02 | Stop reason: HOSPADM

## 2020-03-02 RX ORDER — DIPHENHYDRAMINE HYDROCHLORIDE 50 MG/ML
25 INJECTION INTRAMUSCULAR; INTRAVENOUS EVERY 6 HOURS PRN
Status: DISCONTINUED | OUTPATIENT
Start: 2020-03-02 | End: 2020-03-02 | Stop reason: HOSPADM

## 2020-03-02 RX ORDER — ONDANSETRON 8 MG/1
8 TABLET, ORALLY DISINTEGRATING ORAL EVERY 8 HOURS PRN
Status: DISCONTINUED | OUTPATIENT
Start: 2020-03-02 | End: 2020-03-03 | Stop reason: HOSPADM

## 2020-03-02 RX ORDER — ACETAMINOPHEN 500 MG
1000 TABLET ORAL 3 TIMES DAILY
Status: DISCONTINUED | OUTPATIENT
Start: 2020-03-02 | End: 2020-03-02

## 2020-03-02 RX ORDER — OXYCODONE HYDROCHLORIDE 5 MG/1
5 TABLET ORAL EVERY 4 HOURS PRN
Status: DISCONTINUED | OUTPATIENT
Start: 2020-03-02 | End: 2020-03-02

## 2020-03-02 RX ORDER — ONDANSETRON 2 MG/ML
4 INJECTION INTRAMUSCULAR; INTRAVENOUS EVERY 12 HOURS PRN
Status: DISCONTINUED | OUTPATIENT
Start: 2020-03-02 | End: 2020-03-03 | Stop reason: HOSPADM

## 2020-03-02 RX ORDER — ACETAMINOPHEN 10 MG/ML
1000 INJECTION, SOLUTION INTRAVENOUS EVERY 8 HOURS
Status: DISCONTINUED | OUTPATIENT
Start: 2020-03-02 | End: 2020-03-03 | Stop reason: HOSPADM

## 2020-03-02 RX ORDER — LIDOCAINE HYDROCHLORIDE 20 MG/ML
JELLY TOPICAL DAILY PRN
Status: DISCONTINUED | OUTPATIENT
Start: 2020-03-02 | End: 2020-03-03 | Stop reason: HOSPADM

## 2020-03-02 RX ORDER — IPRATROPIUM BROMIDE AND ALBUTEROL SULFATE 2.5; .5 MG/3ML; MG/3ML
3 SOLUTION RESPIRATORY (INHALATION) EVERY 4 HOURS PRN
Status: DISCONTINUED | OUTPATIENT
Start: 2020-03-02 | End: 2020-03-03 | Stop reason: HOSPADM

## 2020-03-02 RX ORDER — ONDANSETRON 2 MG/ML
4 INJECTION INTRAMUSCULAR; INTRAVENOUS ONCE AS NEEDED
Status: DISCONTINUED | OUTPATIENT
Start: 2020-03-02 | End: 2020-03-02 | Stop reason: HOSPADM

## 2020-03-02 RX ORDER — KETAMINE HCL IN 0.9 % NACL 50 MG/5 ML
SYRINGE (ML) INTRAVENOUS
Status: DISCONTINUED | OUTPATIENT
Start: 2020-03-02 | End: 2020-03-02

## 2020-03-02 RX ORDER — SODIUM CHLORIDE, SODIUM LACTATE, POTASSIUM CHLORIDE, CALCIUM CHLORIDE 600; 310; 30; 20 MG/100ML; MG/100ML; MG/100ML; MG/100ML
INJECTION, SOLUTION INTRAVENOUS CONTINUOUS
Status: DISCONTINUED | OUTPATIENT
Start: 2020-03-02 | End: 2020-03-03 | Stop reason: HOSPADM

## 2020-03-02 RX ORDER — PROPOFOL 10 MG/ML
VIAL (ML) INTRAVENOUS
Status: DISCONTINUED | OUTPATIENT
Start: 2020-03-02 | End: 2020-03-02

## 2020-03-02 RX ORDER — OXYCODONE HYDROCHLORIDE 10 MG/1
10 TABLET ORAL EVERY 4 HOURS PRN
Status: DISCONTINUED | OUTPATIENT
Start: 2020-03-02 | End: 2020-03-03 | Stop reason: HOSPADM

## 2020-03-02 RX ORDER — OXYBUTYNIN CHLORIDE 5 MG/1
5 TABLET ORAL 3 TIMES DAILY PRN
Status: DISCONTINUED | OUTPATIENT
Start: 2020-03-02 | End: 2020-03-02

## 2020-03-02 RX ORDER — OXYCODONE HYDROCHLORIDE 5 MG/1
10 TABLET ORAL EVERY 4 HOURS PRN
Status: DISCONTINUED | OUTPATIENT
Start: 2020-03-02 | End: 2020-03-02

## 2020-03-02 RX ORDER — FAMOTIDINE 20 MG/1
20 TABLET, FILM COATED ORAL 2 TIMES DAILY
Status: DISCONTINUED | OUTPATIENT
Start: 2020-03-02 | End: 2020-03-03 | Stop reason: HOSPADM

## 2020-03-02 RX ORDER — HYDROMORPHONE HYDROCHLORIDE 1 MG/ML
0.2 INJECTION, SOLUTION INTRAMUSCULAR; INTRAVENOUS; SUBCUTANEOUS EVERY 5 MIN PRN
Status: DISCONTINUED | OUTPATIENT
Start: 2020-03-02 | End: 2020-03-02

## 2020-03-02 RX ORDER — MIDAZOLAM HYDROCHLORIDE 1 MG/ML
0.5 INJECTION INTRAMUSCULAR; INTRAVENOUS
Status: DISCONTINUED | OUTPATIENT
Start: 2020-03-02 | End: 2020-03-02

## 2020-03-02 RX ORDER — LIDOCAINE HYDROCHLORIDE 20 MG/ML
INJECTION INTRAVENOUS
Status: DISCONTINUED | OUTPATIENT
Start: 2020-03-02 | End: 2020-03-02

## 2020-03-02 RX ORDER — DEXAMETHASONE SODIUM PHOSPHATE 4 MG/ML
INJECTION, SOLUTION INTRA-ARTICULAR; INTRALESIONAL; INTRAMUSCULAR; INTRAVENOUS; SOFT TISSUE
Status: DISCONTINUED | OUTPATIENT
Start: 2020-03-02 | End: 2020-03-02

## 2020-03-02 RX ORDER — MIDAZOLAM HYDROCHLORIDE 1 MG/ML
INJECTION, SOLUTION INTRAMUSCULAR; INTRAVENOUS
Status: DISCONTINUED | OUTPATIENT
Start: 2020-03-02 | End: 2020-03-02

## 2020-03-02 RX ORDER — ACETAMINOPHEN 10 MG/ML
INJECTION, SOLUTION INTRAVENOUS
Status: DISCONTINUED | OUTPATIENT
Start: 2020-03-02 | End: 2020-03-02

## 2020-03-02 RX ORDER — SODIUM CHLORIDE 9 MG/ML
INJECTION, SOLUTION INTRAVENOUS CONTINUOUS
Status: DISCONTINUED | OUTPATIENT
Start: 2020-03-02 | End: 2020-03-02

## 2020-03-02 RX ORDER — FENTANYL CITRATE 50 UG/ML
INJECTION, SOLUTION INTRAMUSCULAR; INTRAVENOUS
Status: DISCONTINUED | OUTPATIENT
Start: 2020-03-02 | End: 2020-03-02

## 2020-03-02 RX ORDER — KETOROLAC TROMETHAMINE 30 MG/ML
30 INJECTION, SOLUTION INTRAMUSCULAR; INTRAVENOUS EVERY 8 HOURS
Status: DISCONTINUED | OUTPATIENT
Start: 2020-03-02 | End: 2020-03-02

## 2020-03-02 RX ORDER — HYDROMORPHONE HYDROCHLORIDE 1 MG/ML
0.2 INJECTION, SOLUTION INTRAMUSCULAR; INTRAVENOUS; SUBCUTANEOUS EVERY 5 MIN PRN
Status: DISCONTINUED | OUTPATIENT
Start: 2020-03-02 | End: 2020-03-02 | Stop reason: HOSPADM

## 2020-03-02 RX ORDER — OXYCODONE HYDROCHLORIDE 5 MG/1
5 TABLET ORAL EVERY 4 HOURS PRN
Status: DISCONTINUED | OUTPATIENT
Start: 2020-03-02 | End: 2020-03-03 | Stop reason: HOSPADM

## 2020-03-02 RX ORDER — OXYBUTYNIN CHLORIDE 5 MG/1
5 TABLET ORAL 3 TIMES DAILY
Status: DISCONTINUED | OUTPATIENT
Start: 2020-03-02 | End: 2020-03-03 | Stop reason: HOSPADM

## 2020-03-02 RX ORDER — ROCURONIUM BROMIDE 10 MG/ML
INJECTION, SOLUTION INTRAVENOUS
Status: DISCONTINUED | OUTPATIENT
Start: 2020-03-02 | End: 2020-03-02

## 2020-03-02 RX ORDER — ONDANSETRON 2 MG/ML
INJECTION INTRAMUSCULAR; INTRAVENOUS
Status: DISCONTINUED | OUTPATIENT
Start: 2020-03-02 | End: 2020-03-02

## 2020-03-02 RX ORDER — KETOROLAC TROMETHAMINE 30 MG/ML
15 INJECTION, SOLUTION INTRAMUSCULAR; INTRAVENOUS EVERY 8 HOURS
Status: DISCONTINUED | OUTPATIENT
Start: 2020-03-02 | End: 2020-03-02

## 2020-03-02 RX ORDER — KETOROLAC TROMETHAMINE 30 MG/ML
15 INJECTION, SOLUTION INTRAMUSCULAR; INTRAVENOUS EVERY 8 HOURS
Status: DISCONTINUED | OUTPATIENT
Start: 2020-03-02 | End: 2020-03-03

## 2020-03-02 RX ORDER — SIMETHICONE 80 MG
1 TABLET,CHEWABLE ORAL 3 TIMES DAILY PRN
Status: DISCONTINUED | OUTPATIENT
Start: 2020-03-02 | End: 2020-03-03

## 2020-03-02 RX ORDER — FENTANYL CITRATE 50 UG/ML
25 INJECTION, SOLUTION INTRAMUSCULAR; INTRAVENOUS EVERY 5 MIN PRN
Status: DISCONTINUED | OUTPATIENT
Start: 2020-03-02 | End: 2020-03-02 | Stop reason: HOSPADM

## 2020-03-02 RX ORDER — CEFAZOLIN SODIUM 1 G/3ML
2 INJECTION, POWDER, FOR SOLUTION INTRAMUSCULAR; INTRAVENOUS
Status: COMPLETED | OUTPATIENT
Start: 2020-03-02 | End: 2020-03-02

## 2020-03-02 RX ORDER — FENTANYL CITRATE 50 UG/ML
25 INJECTION, SOLUTION INTRAMUSCULAR; INTRAVENOUS EVERY 5 MIN PRN
Status: DISCONTINUED | OUTPATIENT
Start: 2020-03-02 | End: 2020-03-02

## 2020-03-02 RX ORDER — TRAMADOL HYDROCHLORIDE 50 MG/1
50 TABLET ORAL EVERY 6 HOURS PRN
Status: DISCONTINUED | OUTPATIENT
Start: 2020-03-02 | End: 2020-03-02

## 2020-03-02 RX ADMIN — PROPOFOL 200 MG: 10 INJECTION, EMULSION INTRAVENOUS at 09:03

## 2020-03-02 RX ADMIN — FENTANYL CITRATE 25 MCG: 50 INJECTION INTRAMUSCULAR; INTRAVENOUS at 02:03

## 2020-03-02 RX ADMIN — OXYCODONE HYDROCHLORIDE 5 MG: 5 TABLET ORAL at 03:03

## 2020-03-02 RX ADMIN — CEFAZOLIN 2 G: 330 INJECTION, POWDER, FOR SOLUTION INTRAMUSCULAR; INTRAVENOUS at 09:03

## 2020-03-02 RX ADMIN — ROCURONIUM BROMIDE 20 MG: 10 INJECTION, SOLUTION INTRAVENOUS at 11:03

## 2020-03-02 RX ADMIN — FENTANYL CITRATE 100 MCG: 50 INJECTION, SOLUTION INTRAMUSCULAR; INTRAVENOUS at 09:03

## 2020-03-02 RX ADMIN — MIDAZOLAM HYDROCHLORIDE 2 MG: 1 INJECTION, SOLUTION INTRAMUSCULAR; INTRAVENOUS at 08:03

## 2020-03-02 RX ADMIN — KETOROLAC TROMETHAMINE 15 MG: 30 INJECTION, SOLUTION INTRAMUSCULAR; INTRAVENOUS at 02:03

## 2020-03-02 RX ADMIN — SODIUM CHLORIDE, SODIUM GLUCONATE, SODIUM ACETATE, POTASSIUM CHLORIDE, MAGNESIUM CHLORIDE, SODIUM PHOSPHATE, DIBASIC, AND POTASSIUM PHOSPHATE: .53; .5; .37; .037; .03; .012; .00082 INJECTION, SOLUTION INTRAVENOUS at 12:03

## 2020-03-02 RX ADMIN — OXYCODONE HYDROCHLORIDE 10 MG: 10 TABLET ORAL at 06:03

## 2020-03-02 RX ADMIN — ROCURONIUM BROMIDE 20 MG: 10 INJECTION, SOLUTION INTRAVENOUS at 09:03

## 2020-03-02 RX ADMIN — SIMETHICONE CHEW TAB 80 MG 80 MG: 80 TABLET ORAL at 10:03

## 2020-03-02 RX ADMIN — ONDANSETRON 4 MG: 2 INJECTION INTRAMUSCULAR; INTRAVENOUS at 12:03

## 2020-03-02 RX ADMIN — DEXAMETHASONE SODIUM PHOSPHATE 4 MG: 4 INJECTION, SOLUTION INTRAMUSCULAR; INTRAVENOUS at 09:03

## 2020-03-02 RX ADMIN — Medication 20 MG: at 09:03

## 2020-03-02 RX ADMIN — ACETAMINOPHEN 1000 MG: 10 INJECTION, SOLUTION INTRAVENOUS at 10:03

## 2020-03-02 RX ADMIN — SODIUM CHLORIDE, SODIUM GLUCONATE, SODIUM ACETATE, POTASSIUM CHLORIDE, MAGNESIUM CHLORIDE, SODIUM PHOSPHATE, DIBASIC, AND POTASSIUM PHOSPHATE: .53; .5; .37; .037; .03; .012; .00082 INJECTION, SOLUTION INTRAVENOUS at 09:03

## 2020-03-02 RX ADMIN — SODIUM CHLORIDE, SODIUM LACTATE, POTASSIUM CHLORIDE, AND CALCIUM CHLORIDE: .6; .31; .03; .02 INJECTION, SOLUTION INTRAVENOUS at 02:03

## 2020-03-02 RX ADMIN — FENTANYL CITRATE 50 MCG: 50 INJECTION, SOLUTION INTRAMUSCULAR; INTRAVENOUS at 11:03

## 2020-03-02 RX ADMIN — SODIUM CHLORIDE: 0.9 INJECTION, SOLUTION INTRAVENOUS at 08:03

## 2020-03-02 RX ADMIN — FENTANYL CITRATE 50 MCG: 50 INJECTION, SOLUTION INTRAMUSCULAR; INTRAVENOUS at 10:03

## 2020-03-02 RX ADMIN — LIDOCAINE HYDROCHLORIDE 50 MG: 20 INJECTION, SOLUTION INTRAVENOUS at 09:03

## 2020-03-02 RX ADMIN — FAMOTIDINE 20 MG: 20 TABLET ORAL at 09:03

## 2020-03-02 RX ADMIN — ROCURONIUM BROMIDE 20 MG: 10 INJECTION, SOLUTION INTRAVENOUS at 10:03

## 2020-03-02 RX ADMIN — TRAMADOL HYDROCHLORIDE 50 MG: 50 TABLET, FILM COATED ORAL at 04:03

## 2020-03-02 RX ADMIN — ACETAMINOPHEN 1000 MG: 10 INJECTION, SOLUTION INTRAVENOUS at 12:03

## 2020-03-02 RX ADMIN — OXYCODONE HYDROCHLORIDE 10 MG: 10 TABLET ORAL at 10:03

## 2020-03-02 RX ADMIN — Medication 10 MG: at 11:03

## 2020-03-02 RX ADMIN — OXYBUTYNIN CHLORIDE 5 MG: 5 TABLET ORAL at 09:03

## 2020-03-02 RX ADMIN — Medication 20 MG: at 10:03

## 2020-03-02 RX ADMIN — ROCURONIUM BROMIDE 50 MG: 10 INJECTION, SOLUTION INTRAVENOUS at 09:03

## 2020-03-02 RX ADMIN — KETOROLAC TROMETHAMINE 15 MG: 30 INJECTION, SOLUTION INTRAMUSCULAR; INTRAVENOUS at 10:03

## 2020-03-02 RX ADMIN — ACETAMINOPHEN 1000 MG: 500 TABLET ORAL at 05:03

## 2020-03-02 NOTE — ANESTHESIA POSTPROCEDURE EVALUATION
Anesthesia Post Evaluation    Patient: Fernie Rice    Procedure(s) Performed: Procedure(s) (LRB):  XI ROBOTIC REIMPLANTATION, URETER (Right)    Final Anesthesia Type: general    Patient location during evaluation: PACU  Patient participation: Yes- Able to Participate  Level of consciousness: awake and alert  Post-procedure vital signs: reviewed and stable  Pain management: adequate  Airway patency: patent    PONV status at discharge: No PONV  Anesthetic complications: no      Cardiovascular status: hemodynamically stable  Respiratory status: unassisted  Hydration status: euvolemic  Follow-up not needed.          Vitals Value Taken Time   /52 3/2/2020  2:02 PM   Temp 36.3 °C (97.3 °F) 3/2/2020  1:06 PM   Pulse 100 3/2/2020  2:11 PM   Resp 19 3/2/2020  2:11 PM   SpO2 100 % 3/2/2020  2:11 PM   Vitals shown include unvalidated device data.      No case tracking events are documented in the log.      Pain/Mayur Score: Pain Rating Prior to Med Admin: 5 (3/2/2020  2:06 PM)  Mayur Score: 8 (3/2/2020  1:15 PM)

## 2020-03-02 NOTE — PLAN OF CARE
Pt AAOX4 Pt remained stable during pacu stay. VSS. Patient received scheduled Toradol 15mg IV and Fentanyl 75mcg IV. No N&V. Incision to abdomen intact with 6 lap sites with dermabond. RACHEL drain to LLQ to bulb suction. Rondon in rondon draining without difficulty. Teds/SCD's in place throughout duration in PACU. Transferred to the next Phase of Care.

## 2020-03-02 NOTE — OP NOTE
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  robotic assisted laparoscopic ureteral reimplant    Specimen(s): none    Staff Surgeon: Chace Prado MD    Assistant Surgeon: Guzman Zelaya MD      Anesthesia:  General endotracheal anesthesia    Indications:  Fernie Rice is a 19 YOM with complete right ureteral duplication with ectopic, obstructed upper pole ureter which appears to insert into the prostate, presenting for right upper pole ureteroneocystotomy.    Findings:    -- upper pole ureter anterior to lower pole ureter at iliacs, lower pole ureter crossed anterior to upper pole ureter just distal to vas deferens  - upper pole ureter confirmed by irrigating through upper pole nephrostomy tube  - tunneled extravesical reimplant performed  - ureteroneocystotomy water tight     Estimated Blood Loss: 50 mL    Drains:   1.  18 fr rondon catheter  2.  6 fr x 24 cm JJ ureteral stent   3.  15 fr Justice drain      Procedure in Detail:  After general endotracheal anesthesia, the patient was carefully positioned, padded, prepped and draped.  Positioning and padding was checked by the  surgeon and the circulating nurse.  IV antibiotics were administered within 1 hour prior to making initial skin incision.  An OG tube was placed.  A Edmond catheter was placed.  A timeout was called. The patient's identity was confirmed with 2 identifiers.  The correct procedure, allergies, blood products were verified by the entire operative team.                                                                        A Veress needle was placed at the supraumbilical position, and with aspiration and drop test the abdomen was insufflated. The initial pressures were < 10 mm Hg, confirming peritoneal location. The abdomen insufflated to 15 mmHg.  An 8 mm trocar was placed at this site and a camera was introduced.  The abdominal cavity was carefully inspected. There was no evidence of trauma to the peritoneal contents, nor any evidence of injury to the retroperitoneum.     Four more trocars were placed under direct vision. We turned our attention first towards the patient's right side.  We identified an area 8cm lateral to the umbilicus and placed an 8mm trocar under direct vision. An 12 mm air seal assistant trocar was then placed at least 8cm lateral to this and 2 finger breadths off the ASIS. We then our attention towards the patient's left. An 8mm trocar was placed 8cm lateral to the umbilicus. At least 8cm lateral to this and above the ASIS we placed another 8mm trocar. The 5mm trocar was then placed under direct vision inferior to the costal margin between the supraumbilical port and 8mm working trocar on the right. The robot was docked.     The right ureters were identified where they crossed the iliac vessels.  The peritoneum overlying this was retracted anteriorly and incised with electrocautery, taking care to avoid the ureters.  This was further dissected distally along the anterior surface of the ureter as distally as possible.  A vessiloop was placed around the anterior of the two ureters at the level of the  iliacs.  This was suspected to be the upper pole ureter based on imaging.  This ureter was lifted anteriorly with the 4th arm for retraction, and the posterior and lateral attachments of the ureter were released, taking care to keep the adventitia and periureteral blood supply intact.  The ureter was circumferentially dissected in this manner distally, until distal to the vas deferens.  At this point, the other ureter passed anteriorly over the ureter we had tagged with a vessel loop, and entered the bladder medially.  This confirmed that the tagged ureter was the upper pole, ectopic ureter.  A vessel loop was also placed around the lower pole ureter.  A 30 cc fluid bolus was injected through the upper pole nephrostomy tube, further confirming the upper pole ureter was correctly identified.   The upper pole ureter was then divided proximal to the obstruction.  The cut edge of the ureter appeared healthy and viable, and bleeding was noted.  The ureter was spatulated for a distance of approximately 1.5 cm on its posterior surface.      The bladder was filled to 300 cc.  The peritoneum overlying the bladder was incised and cleared.  The detrusor muscle was incised longitudinally, leaving the mucosa intact.  The edges of the detrusor muscle were elevated and dissected off of the mucosa laterally for a short distance.  The mucosa was incised for a distance equivalent to the diameter of the spatulated neoureteral orifice.      The neoureteral orifice was sutured to the cystotomy along the spatulated edges with two running 4-0 vicryl sutures. This approximated bladder mucosa with a very small amount of detrusor muscle to the full thickness of the ureteral wall, taking care to include ureteral mucosa.  These sutures were tied, leaving an approximately 1 cm gap distally and anteriorly between the cut edge of ureter and the cystotomy, to allow for placement of the stent transabdominally.      An 18 fr councill tip catheter  was placed in the bladder and a motion wire was introduced into the abdomen through this and advanced up the ureter until resistance was felt.  A 6 fr by 24 cm JJ ureteral stent was advanced over this until the distal coil was visible at the level of the bladder, and the wire was removed.  The nephrostomy tube was removed. The distal coil of the stent was inserted into the bladder via the opening in the ureteroneocystotomy.  This was then closed with running and interrupted 4-0 vicryl sutures.  The bladder was then filled to 180 cc.  No leak was noted.      The detrusor muscle was approximated over the ureteroneocystotomy, creating an intradetrusor tunnel. This was performed using 2-0 vicryl in interrupted fashion for a distance of approximately 3 cm.      The rondon catheter was replaced with a 18 fr regular rondon catheter. The balloon was inflated with 10 cc and the catheter was secured.      A 15 fr Justice drain was placed via the left lower quadrant port incision. This was secured with a 2-0 nylon.      All needle and sponge counts were correct.      The abdomen was desufflated, all ports removed, skin incisions closed with 4-0 monocryl and dressed with dermabond. Local anesthetic was infiltrated subcutaneously.     The patient tolerated the procedure well and was transferred to the PACU in stable condition.      Disposition:  The patient will be admitted for monitoring.  The rondon catheter will remain in place for 1 week.  The stent will remain for 4 weeks. The drain will be removed when output is sufficiently low.     Guzman Zelaya MD

## 2020-03-02 NOTE — ANESTHESIA PROCEDURE NOTES
Intubation  Performed by: Gisela Blancas CRNA  Authorized by: Mark Jade MD     Intubation:     Induction:  Intravenous    Intubated:  Postinduction    Mask Ventilation:  Easy mask    Attempts:  1    Attempted By:  CRNA    Blade:  Grimaldo 2    Laryngeal View Grade: Grade I - full view of chords      Difficult Airway Encountered?: No      Complications:  None    Airway Device:  Oral endotracheal tube    Airway Device Size:  7.5    Style/Cuff Inflation:  Cuffed (inflated to minimal occlusive pressure)    Inflation Amount (mL):  6    Tube secured:  22    Placement Verified By:  Capnometry    Complicating Factors:  None    Findings Post-Intubation:  BS equal bilateral

## 2020-03-02 NOTE — PROGRESS NOTES
Mother at bedside states that her son has a high tolerance for pain and request to ask MD to order something stronger for her son. Paged MD awaiting callback.

## 2020-03-02 NOTE — TRANSFER OF CARE
"Anesthesia Transfer of Care Note    Patient: Fernie Rice    Procedure(s) Performed: Procedure(s) (LRB):  XI ROBOTIC REIMPLANTATION, URETER (Right)    Patient location: PACU    Anesthesia Type: general    Transport from OR: Transported from OR on 6-10 L/min O2 by face mask with adequate spontaneous ventilation    Post pain: adequate analgesia    Post assessment: no apparent anesthetic complications and tolerated procedure well    Post vital signs: stable    Level of consciousness: awake    Nausea/Vomiting: no nausea/vomiting    Complications: none    Transfer of care protocol was followed      Last vitals:   Visit Vitals  /62 (BP Location: Left arm, Patient Position: Lying)   Pulse 97   Temp 36.3 °C (97.3 °F) (Temporal)   Resp 16   Ht 5' 8" (1.727 m)   Wt 68 kg (150 lb)   SpO2 99%   BMI 22.81 kg/m²     "

## 2020-03-02 NOTE — NURSING TRANSFER
Nursing Transfer Note      3/2/2020     Transfer To: 553B    Transfer via , bed    Transfer with pper    Transported by transport    Medicines sent: IVF    Chart send with patient:  yes    Notified: mom    Patient reassessed at:3/2/2020    Upon arrival to floor:

## 2020-03-03 VITALS
HEART RATE: 59 BPM | WEIGHT: 147.63 LBS | BODY MASS INDEX: 22.37 KG/M2 | TEMPERATURE: 98 F | HEIGHT: 68 IN | SYSTOLIC BLOOD PRESSURE: 128 MMHG | DIASTOLIC BLOOD PRESSURE: 64 MMHG | OXYGEN SATURATION: 93 % | RESPIRATION RATE: 18 BRPM

## 2020-03-03 LAB — FUNGUS SPEC CULT: NORMAL

## 2020-03-03 PROCEDURE — 25000003 PHARM REV CODE 250: Performed by: STUDENT IN AN ORGANIZED HEALTH CARE EDUCATION/TRAINING PROGRAM

## 2020-03-03 PROCEDURE — 99900035 HC TECH TIME PER 15 MIN (STAT)

## 2020-03-03 PROCEDURE — 63600175 PHARM REV CODE 636 W HCPCS: Performed by: STUDENT IN AN ORGANIZED HEALTH CARE EDUCATION/TRAINING PROGRAM

## 2020-03-03 PROCEDURE — 12000002 HC ACUTE/MED SURGE SEMI-PRIVATE ROOM

## 2020-03-03 RX ORDER — POLYETHYLENE GLYCOL 3350 17 G/17G
17 POWDER, FOR SOLUTION ORAL DAILY
Qty: 238 G | Refills: 0 | Status: SHIPPED | OUTPATIENT
Start: 2020-03-03

## 2020-03-03 RX ORDER — OXYBUTYNIN CHLORIDE 5 MG/1
5 TABLET ORAL 3 TIMES DAILY
Qty: 90 TABLET | Refills: 11 | Status: SHIPPED | OUTPATIENT
Start: 2020-03-03 | End: 2021-03-03

## 2020-03-03 RX ORDER — DIPHENHYDRAMINE HCL 25 MG
25 CAPSULE ORAL EVERY 6 HOURS PRN
Status: DISCONTINUED | OUTPATIENT
Start: 2020-03-03 | End: 2020-03-03 | Stop reason: HOSPADM

## 2020-03-03 RX ORDER — ATROPA BELLADONNA AND OPIUM 16.2; 6 MG/1; MG/1
60 SUPPOSITORY RECTAL EVERY 12 HOURS PRN
Status: DISCONTINUED | OUTPATIENT
Start: 2020-03-03 | End: 2020-03-03 | Stop reason: HOSPADM

## 2020-03-03 RX ORDER — OXYCODONE HYDROCHLORIDE 5 MG/1
10 CAPSULE ORAL EVERY 4 HOURS PRN
Qty: 20 CAPSULE | Refills: 0 | Status: SHIPPED | OUTPATIENT
Start: 2020-03-03

## 2020-03-03 RX ORDER — KETOROLAC TROMETHAMINE 30 MG/ML
30 INJECTION, SOLUTION INTRAMUSCULAR; INTRAVENOUS EVERY 8 HOURS
Status: DISCONTINUED | OUTPATIENT
Start: 2020-03-03 | End: 2020-03-03 | Stop reason: HOSPADM

## 2020-03-03 RX ORDER — PREGABALIN 50 MG/1
50 CAPSULE ORAL 2 TIMES DAILY
Status: DISCONTINUED | OUTPATIENT
Start: 2020-03-03 | End: 2020-03-03 | Stop reason: HOSPADM

## 2020-03-03 RX ORDER — PREGABALIN 50 MG/1
50 CAPSULE ORAL 2 TIMES DAILY
Qty: 20 CAPSULE | Refills: 0 | Status: SHIPPED | OUTPATIENT
Start: 2020-03-03 | End: 2020-03-13

## 2020-03-03 RX ORDER — METHOCARBAMOL 500 MG/1
1000 TABLET, FILM COATED ORAL 4 TIMES DAILY
Qty: 80 TABLET | Refills: 0 | Status: SHIPPED | OUTPATIENT
Start: 2020-03-03 | End: 2020-03-13

## 2020-03-03 RX ORDER — POLYETHYLENE GLYCOL 3350 17 G/17G
17 POWDER, FOR SOLUTION ORAL DAILY
Status: DISCONTINUED | OUTPATIENT
Start: 2020-03-03 | End: 2020-03-03 | Stop reason: HOSPADM

## 2020-03-03 RX ORDER — METHOCARBAMOL 500 MG/1
1000 TABLET, FILM COATED ORAL 4 TIMES DAILY
Status: DISCONTINUED | OUTPATIENT
Start: 2020-03-03 | End: 2020-03-03 | Stop reason: HOSPADM

## 2020-03-03 RX ORDER — ACETAMINOPHEN 500 MG
500 TABLET ORAL EVERY 6 HOURS PRN
Qty: 40 TABLET | Refills: 0 | Status: SHIPPED | OUTPATIENT
Start: 2020-03-03

## 2020-03-03 RX ORDER — KETOROLAC TROMETHAMINE 10 MG/1
10 TABLET, FILM COATED ORAL EVERY 6 HOURS PRN
Qty: 40 TABLET | Refills: 0 | Status: ON HOLD | OUTPATIENT
Start: 2020-03-03 | End: 2023-02-08 | Stop reason: HOSPADM

## 2020-03-03 RX ORDER — ALUMINUM HYDROXIDE, MAGNESIUM HYDROXIDE, AND SIMETHICONE 2400; 240; 2400 MG/30ML; MG/30ML; MG/30ML
30 SUSPENSION ORAL EVERY 6 HOURS PRN
Status: DISCONTINUED | OUTPATIENT
Start: 2020-03-03 | End: 2020-03-03 | Stop reason: HOSPADM

## 2020-03-03 RX ADMIN — OXYBUTYNIN CHLORIDE 5 MG: 5 TABLET ORAL at 04:03

## 2020-03-03 RX ADMIN — FAMOTIDINE 20 MG: 20 TABLET ORAL at 08:03

## 2020-03-03 RX ADMIN — KETOROLAC TROMETHAMINE 15 MG: 30 INJECTION, SOLUTION INTRAMUSCULAR; INTRAVENOUS at 06:03

## 2020-03-03 RX ADMIN — DIPHENHYDRAMINE HYDROCHLORIDE 25 MG: 25 CAPSULE ORAL at 01:03

## 2020-03-03 RX ADMIN — METHOCARBAMOL TABLETS 1000 MG: 500 TABLET, COATED ORAL at 08:03

## 2020-03-03 RX ADMIN — PREGABALIN 50 MG: 50 CAPSULE ORAL at 08:03

## 2020-03-03 RX ADMIN — KETOROLAC TROMETHAMINE 30 MG: 30 INJECTION, SOLUTION INTRAMUSCULAR; INTRAVENOUS at 04:03

## 2020-03-03 RX ADMIN — ACETAMINOPHEN 1000 MG: 10 INJECTION, SOLUTION INTRAVENOUS at 06:03

## 2020-03-03 RX ADMIN — METHOCARBAMOL TABLETS 1000 MG: 500 TABLET, COATED ORAL at 12:03

## 2020-03-03 RX ADMIN — OXYBUTYNIN CHLORIDE 5 MG: 5 TABLET ORAL at 08:03

## 2020-03-03 RX ADMIN — OXYCODONE HYDROCHLORIDE 10 MG: 10 TABLET ORAL at 12:03

## 2020-03-03 RX ADMIN — SODIUM CHLORIDE, SODIUM LACTATE, POTASSIUM CHLORIDE, AND CALCIUM CHLORIDE: .6; .31; .03; .02 INJECTION, SOLUTION INTRAVENOUS at 05:03

## 2020-03-03 RX ADMIN — OXYCODONE HYDROCHLORIDE 10 MG: 10 TABLET ORAL at 07:03

## 2020-03-03 NOTE — PROGRESS NOTES
[facility]  Urology  Progress Note    Patient Name: Fernie Rice  MRN: [id number]  Admission Date: 3/2/2020  Hospital Length of Stay: 1 days  Code Status: Prior   Attending Provider: Chace Prado MD   Primary Care Physician: Fernie Garcia MD    Subjective:     HPI:see h&p    Interval History:   POD #1 s/p right upper pole robotic ureteral reimplant.  Drain removed on rounds.  Pain control remains an issue.  Patient complains of rectal pain, likely bladder spasms.  Otherwise stable.  Urine output light pink in rondon.      Review of Systems   Gastrointestinal: Positive for abdominal pain and rectal pain.   All other systems reviewed and are negative.       Objective:     Temp:  [96.9 °F (36.1 °C)-98.1 °F (36.7 °C)] 96.9 °F (36.1 °C)  Pulse:  [] 53  Resp:  [16-20] 20  SpO2:  [94 %-100 %] 97 %  BP: (108-136)/(52-82) 123/65     Body mass index is 22.44 kg/m².           Lines/Drains/Airways     Drain                 Nephrostomy 02/02/20 2252 Right 8 Fr. 29 days         Nephrostomy 02/02/20 2252 Right 8 Fr. 29 days         Closed/Suction Drain 03/02/20 1308 Lateral Abdomen Bulb 15 Fr. less than 1 day         Urethral Catheter 03/02/20 0932 Latex 18 Fr. less than 1 day          Peripheral Intravenous Line                 Peripheral IV - Single Lumen 03/02/20 0846 20 G Right Hand less than 1 day         Peripheral IV - Single Lumen 03/02/20 1300 20 G Left Forearm less than 1 day                Physical Exam   Constitutional: He is oriented to person, place, and time. He appears well-developed and well-nourished. No distress.   HENT:   Head: Normocephalic and atraumatic.   Eyes: No scleral icterus.   Neck: No tracheal deviation present.   Pulmonary/Chest: Effort normal. No respiratory distress.   Abdominal:   Incisions without sign of infection, minimal distension, soft, appropriately tender to palpation diffusely.   Neurological: He is alert and oriented to person, place, and time.    Psychiatric: He has a normal mood and affect. His behavior is normal. Judgment and thought content normal.       Significant Labs:  BMP:  No results for input(s): NA, K, CL, CO2, BUN, CREATININE, LABGLOM, GLUCOSE, CALCIUM in the last 168 hours.    CBC:  No results for input(s): WBC, HGB, HCT, PLT in the last 168 hours.    CMP: No results for input(s): GLU, NA, K, CL, CO2, BUN, CREATININE, CALCIUM, MG in the last 168 hours.    Significant Imaging:  no imaging for review.    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 [Q62.5, N13.8] 02/03/2020 Not Applicable    Duplicated ureter, right [Q62.5] 02/03/2020 Not Applicable    Moderate tetrahydrocannabinol (THC) dependence [F12.20] 02/03/2020 Yes    Tobacco abuse [Z72.0] 11/05/2019 Yes    Eczema herpeticum [B00.0] 06/15/2016 Yes    Impetigo [L01.00] 06/14/2016 Yes    Esophageal obstruction [K22.2] 11/22/2014 Yes      Problems Resolved During this Admission:    Diagnosis Date Noted Date Resolved POA    Ureterocele [N28.89] 02/14/2020 03/02/2020 Yes       VTE Risk Mitigation (From admission, onward)         Ordered     Place sequential compression device  Until discontinued      03/02/20 2040              -pain control  -oxybutynin and B&O suppository for bladder spasms  -out of bed and ambulate  -discharge planning for later today pending better pain control.  -patient will be referred for outpatient primary care visit at their request to discuss possible antidepressant.  I explained that given his narcotic requirement, I would not feel comfortable starting an antidepressant at this time.    Chace Prado MD  Urology  [facility]

## 2020-03-03 NOTE — NURSING
Pt c/o of pain to rectum whenever he has the urge to pass gas, states pain comes and goes and is a 10 out of 10 pain. Encouraged pt to ambulate in halls to help with gas pain. Paged on-call for Johan x2. Awaiting call-back. Will pass on to on-coming nurse.

## 2020-03-03 NOTE — HPI
Fernie Rice is a 19 y.o. 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      He was admitted to [facility] 2/2/2020 due to flank pain coupled with subjective fevers and shaking chills. In the ED on 2/2/2020, he was febrile to 103 and tachycardic to 141. Clean catch UA was nitrite positive. 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 Because of his difficult anatomy, poor clinic picture we deferred to IR placing nephrostomy tube in both right upper and lower poles. Urine culture from nephrostomy tubes were negative for growth but gram stain showed GPCs and GNRs.     He was treated with antibiotics and returned for cystoscopy with RGP. Right RGP showed lower pole moity draining well and his lower pole nephrostomy tube was removed. His right upper pole UO was thought to be at or near the verumontanum but could not be cannulated successfully for RGP. On antegrade nephrostogram of upper pole moiety, the ureter was dilated and tortuous. It appeared to insert distal to the bladder neck. Cystogram showed bladder capacity of 1100cc.      A NM MAG 3 scan was obtained on 2/17 showing upper moiety of the right kidney appearing to be nonfunctional, however there is considerable parenchyma on the upper pole on the CT.      Today he is doing well. His upper pole nephrostomy tube output has been approximately 200-400cc/day

## 2020-03-03 NOTE — DISCHARGE SUMMARY
[facility]  Urology  Discharge Summary      Patient Name: Fernie Rice  MRN: [id number]  Admission Date: 3/2/2020  Hospital Length of Stay: 1 days  Discharge Date and Time: No discharge date for patient encounter.  Attending Physician: Chace Prado MD   Discharging Provider: Celia Valdes MD  Primary Care Physician: Fernie Garcia MD    HPI:   Fernie Rice is a 19 y.o. 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      He was admitted to [facility] 2/2/2020 due to flank pain coupled with subjective fevers and shaking chills. In the ED on 2/2/2020, he was febrile to 103 and tachycardic to 141. Clean catch UA was nitrite positive. 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 Because of his difficult anatomy, poor clinic picture we deferred to IR placing nephrostomy tube in both right upper and lower poles. Urine culture from nephrostomy tubes were negative for growth but gram stain showed GPCs and GNRs.     He was treated with antibiotics and returned for cystoscopy with RGP. Right RGP showed lower pole moity draining well and his lower pole nephrostomy tube was removed. His right upper pole UO was thought to be at or near the verumontanum but could not be cannulated successfully for RGP. On antegrade nephrostogram of upper pole moiety, the ureter was dilated and tortuous. It appeared to insert distal to the bladder neck. Cystogram showed bladder capacity of 1100cc.      A NM MAG 3 scan was obtained on 2/17 showing upper moiety of the right kidney appearing to be nonfunctional, however there is considerable parenchyma on the upper pole on the CT.      Today he is doing well. His upper pole nephrostomy tube output has been approximately 200-400cc/day     Procedure(s) (LRB):  XI ROBOTIC REIMPLANTATION, URETER (Right)     Indwelling Lines/Drains at time of  discharge:   Lines/Drains/Airways     Drain                 Nephrostomy 02/02/20 2252 Right 8 Fr. 29 days         Nephrostomy 02/02/20 2252 Right 8 Fr. 29 days         Urethral Catheter 03/02/20 0932 Latex 18 Fr. 1 day         Closed/Suction Drain 03/02/20 1308 Lateral Abdomen Bulb 15 Fr. less than 1 day                Hospital Course (synopsis of major diagnoses, care, treatment, and services provided during the course of the hospital stay): SAM COLE 19 y.o.male underwent: Procedure(s) (LRB):  XI ROBOTIC REIMPLANTATION, URETER (Right). The patient tolerated the procedure well, was transferred to recovery post-op, and then transferred to the floor for continuation of medical care. The patient's clinical condition progressively improved. By the time of discharge, he was tolerating a diet without nausea or vomiting, pain control remained an issue and on POD1 we began to tackle his pain with a multi-modal approach using oral narcotic and muscle relaxant medications and IM injections of toradol. On POD 1 he was ambulating without difficulty. On POD 1 the patient was discharged to home. On discharge, the patient's incisions were c/d/i and the surgical site was soft and appropriately tender to palpation. RACHEL drain output was stable and serosanguinous; removed prior to discharge. The patient will follow up in outpatient clinic in 1 week for rondon removal and voiding trial.        Consults:     Significant Diagnostic Studies:    KUB 3/3/2020:   Right ureteral stent in good position.       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    Specialty:  Urology  Why:  Tayler Balderrama OR: cystoscopy with left ureteral stent removal.   Contact information:  [facility]  [facility] [id number] [phone]             [facility] - Urology 4th Floor In 1 week.    Specialty:  Urology  Why:  rondon removal and VT with JESSICA.   Contact information:  Mirna Flowers [address]  [facility] [address] [phone]  Additional information:  [facility] - 4th Floor               Patient Instructions:      Ambulatory referral/consult to Family Practice   Standing Status: Future   Referral Priority: Routine Referral Type: Consultation   Referral Reason: Specialty Services Required   Requested Specialty: Family Medicine   Number of Visits Requested: 1     No driving until:   Order Comments: Do not drive while taking narcotics.     Other restrictions (specify):   Order Comments: Ok to shower 48 hours after surgery. No baths for 4 weeks.     Lifting restrictions   Order Comments: Please do not lift great than 10 pounds for 6 weeks.     Notify your health care provider if you experience any of the following:  temperature >100.4     Notify your health care provider if you experience any of the following:  persistent nausea and vomiting or diarrhea     Notify your health care provider if you experience any of the following:  severe uncontrolled pain     Notify your health care provider if you experience any of the following:  redness, tenderness, or signs of infection (pain, swelling, redness, odor or  green/yellow discharge around incision site)     Notify your health care provider if you experience any of the following:  severe persistent headache     Notify your health care provider if you experience any of the following:  increased confusion or weakness     Activity as tolerated     Medications:  Reconciled Home Medications:      Medication List      START taking these medications    ketorolac 10 mg tablet  Commonly known as:  TORADOL  Take 1 tablet (10 mg total) by mouth every 6 (six) hours as needed for Pain.     methocarbamol 500 MG Tab  Commonly known as:  ROBAXIN  Take 2 tablets (1,000 mg total) by mouth 4 (four) times daily. for 10 days     oxybutynin 5 MG Tab  Commonly known as:  DITROPAN  Take 1 tablet (5 mg total) by mouth 3 (three) times daily.     polyethylene glycol 17 gram/dose powder  Commonly known as:  GLYCOLAX  Mix 1 capful (17 g) with a liquid and take by mouth once daily.     pregabalin 50 MG capsule  Commonly known as:  LYRICA  Take 1 capsule (50 mg total) by mouth 2 (two) times daily. for 10 days        CHANGE how you take these medications    acetaminophen 500 MG tablet  Commonly known as:  TYLENOL  Take 1 tablet (500 mg total) by mouth every 6 (six) hours as needed for Pain.  What changed:    · when to take this  · reasons to take this     oxyCODONE 5 mg Cap  Commonly known as:  OXY-IR  Take 2 capsules (10 mg total) by mouth every 4 (four) hours as needed for Pain.  What changed:    · how much to take  · when to take this        CONTINUE taking these medications    albuterol-ipratropium 2.5 mg-0.5 mg/3 mL nebulizer solution  Commonly known as:  DUO-NEB  Take 3 mLs by nebulization every 4 (four) hours as needed for Wheezing or Shortness of Breath. Rescue     Dupixent 300 mg/2 mL Syrg  Generic drug:  dupilumab  Inject into the skin every 14 (fourteen) days.     ibuprofen 800 MG tablet  Commonly known as:  ADVIL,MOTRIN  Take 800 mg by mouth 3 (three) times daily as needed for Pain.     *  ondansetron 4 MG tablet  Commonly known as:  ZOFRAN     * ondansetron 8 MG tablet  Commonly known as:  ZOFRAN  TK 1 T PO Q 12 H.     ProAir HFA 90 mcg/actuation inhaler  Generic drug:  albuterol  Inhale 1-2 puffs into the lungs every 6 (six) hours as needed for Wheezing or Shortness of Breath. Rescue         * This list has 2 medication(s) that are the same as other medications prescribed for you. Read the directions carefully, and ask your doctor or other care provider to review them with you.            STOP taking these medications    oseltamivir 75 MG capsule  Commonly known as:  TAMIFLU            Time spent on the discharge of patient: 20 minutes    Celia Valdes MD  Urology  [facility]

## 2020-03-03 NOTE — NURSING
Pt given AVS and rondon bag teaching w/ verbalized understanding and teachback, IVs removed, meications delivered to bedside and wheeled to vehicle

## 2020-03-09 ENCOUNTER — OFFICE VISIT (OUTPATIENT)
Dept: UROLOGY | Facility: CLINIC | Age: 20
End: 2020-03-09
Payer: COMMERCIAL

## 2020-03-09 VITALS
SYSTOLIC BLOOD PRESSURE: 133 MMHG | HEART RATE: 101 BPM | DIASTOLIC BLOOD PRESSURE: 88 MMHG | BODY MASS INDEX: 24.26 KG/M2 | HEIGHT: 69 IN | WEIGHT: 163.81 LBS

## 2020-03-09 DIAGNOSIS — Z98.890 POST-OPERATIVE STATE: Primary | ICD-10-CM

## 2020-03-09 DIAGNOSIS — Z46.6 ENCOUNTER FOR FOLEY CATHETER REMOVAL: ICD-10-CM

## 2020-03-09 PROCEDURE — 99999 PR PBB SHADOW E&M-EST. PATIENT-LVL IV: CPT | Mod: PBBFAC,,, | Performed by: NURSE PRACTITIONER

## 2020-03-09 PROCEDURE — 99024 PR POST-OP FOLLOW-UP VISIT: ICD-10-PCS | Mod: S$GLB,,, | Performed by: NURSE PRACTITIONER

## 2020-03-09 PROCEDURE — 99024 POSTOP FOLLOW-UP VISIT: CPT | Mod: S$GLB,,, | Performed by: NURSE PRACTITIONER

## 2020-03-09 PROCEDURE — 99999 PR PBB SHADOW E&M-EST. PATIENT-LVL IV: ICD-10-PCS | Mod: PBBFAC,,, | Performed by: NURSE PRACTITIONER

## 2020-03-09 NOTE — PROGRESS NOTES
Subjective:       Patient ID: Fernie Rice is a 19 y.o. male.    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  03/02/2020 Procedure(s) Performed with Dr. Prado.:   1.  robotic assisted laparoscopic ureteral reimplant    Findings:    -- upper pole ureter anterior to lower pole ureter at iliacs, lower pole ureter crossed anterior to upper pole ureter just distal to vas deferens  - upper pole ureter confirmed by irrigating through upper pole nephrostomy tube  - tunneled extravesical reimplant performed  - ureteroneocystotomy water tight       Here today for rondon removal.  (+) discomfort from the rondon.  Otherwise no other problems.    He is schedule for (L) stent removal on 04/02/2020.         Past Medical History:  No date: ADHD (attention deficit hyperactivity disorder)  No date: Asthma  No date: Eczema  No date: Esophagitis  No date: Food impaction of esophagus  No date: Multiple food allergies  No date: Vernal keratoconjunctivitis    Past Surgical History:  No date: ADENOIDECTOMY  2/14/2020: ANTEGRADE NEPHROSTOGRAPHY; Right      Comment:  Procedure: NEPHROSTOGRAM, ANTEGRADE;  Surgeon: Chace Prado MD;  Location: [address];  Service:                Urology;  Laterality: Right;  1/2/2020: CLOSED REDUCTION OF INJURY OF HAND WITH PERCUTANEOUS   PINNING; Right      Comment:  Procedure: CLOSED REDUCTION, HAND, WITH PERCUTANEOUS                PINNING;  Surgeon: Chace Jerez MD;  Location: [facility];  Service: Orthopedics;  Laterality: Right;  2/14/2020: CYSTOGRAM; N/A      Comment:  Procedure: CYSTOGRAM;  Surgeon: Chace Prado MD;                 Location: [address];  Service: Urology;                 Laterality: N/A;  2/14/2020: CYSTOSCOPY; N/A       Comment:  Procedure: CYSTOSCOPY;  Surgeon: Chace Prado MD;                Location: [facility];  Service: Urology;                 Laterality: N/A;  75 min  No date: EYE SURGERY      Comment:  ulcers removed  2/14/2020: REPLACEMENT OF NEPHROSTOMY TUBE; Right      Comment:  Procedure: REMOVAL, NEPHROSTOMY TUBE;  Surgeon: Chace Prado MD;  [facility];  Service:                Urology;  Laterality: Right;  2/14/2020: RETROGRADE PYELOGRAPHY; Right      Comment:  Procedure: PYELOGRAM, RETROGRADE;  Surgeon: Chace Prado MD;  [facility];  Service:                Urology;  Laterality: Right;  3/2/2020: ROBOT-ASSISTED LAPAROSCOPIC REIMPLANTATION OF URETER USING   DA URI XI; Right      Comment:  Procedure: XI ROBOTIC REIMPLANTATION, URETER;  Surgeon:                Chace Prado MD;  Location: [facility] OR 2ND [facility]LR;                 Service: Urology;  Laterality: Right;  3hrs  No date: SINUS SURGERY    History reviewed.  No pertinent family history.      Social History    Socioeconomic History      Marital status: Single      Spouse name: Not on file      Number of children: Not on file      Years of education: Not on file      Highest education level: Not on file    Occupational History      Not on file    Social Needs      Financial resource strain: Not on file      Food insecurity:        Worry: Not on file        Inability: Not on file      Transportation needs:        Medical: Not on file        Non-medical: Not on file    Tobacco Use      Smoking status: Current Some Day Smoker      Smokeless tobacco: Never Used    Substance and Sexual Activity      Alcohol use: Yes        Alcohol/week: 20.0 standard drinks        Types: 20 Cans of beer per week      Drug use: Yes        Types: Marijuana      Sexual activity: Yes        Partners: Female        Birth control/protection: Condom        Comment: quit 2 weeks ago.    Lifestyle      Physical  activity:        Days per week: Not on file        Minutes per session: Not on file      Stress: Not on file    Relationships      Social connections:        Talks on phone: Not on file        Gets together: Not on file        Attends Hinduism service: Not on file        Active member of club or organization: Not on file        Attends meetings of clubs or organizations: Not on file        Relationship status: Not on file    Other Topics      Concerns:        Not on file    Social History Narrative      Not on file      Allergies:  Fish containing products; Peanut; Amoxicillin; Bactrim (sulfamethoxazole-trimethoprim); Corn containing products; Soy fiber; and Soy flour    Medications:  Current Outpatient Medications:   acetaminophen (TYLENOL) 500 MG tablet, Take 1 tablet (500 mg total) by mouth every 6 (six) hours as needed for Pain., Disp: 40 tablet, Rfl: 0  albuterol-ipratropium (DUO-NEB) 2.5 mg-0.5 mg/3 mL nebulizer solution, Take 3 mLs by nebulization every 4 (four) hours as needed for Wheezing or Shortness of Breath. Rescue, Disp: 180 mL, Rfl: 0  dupilumab (DUPIXENT) 300 mg/2 mL Syrg, Inject into the skin every 14 (fourteen) days. , Disp: , Rfl:   ibuprofen (ADVIL,MOTRIN) 800 MG tablet, Take 800 mg by mouth 3 (three) times daily as needed for Pain., Disp: , Rfl:   ketorolac (TORADOL) 10 mg tablet, Take 1 tablet (10 mg total) by mouth every 6 (six) hours as needed for Pain., Disp: 40 tablet, Rfl: 0  methocarbamol (ROBAXIN) 500 MG Tab, Take 2 tablets (1,000 mg total) by mouth 4 (four) times daily. for 10 days, Disp: 80 tablet, Rfl: 0  ondansetron (ZOFRAN) 4 MG tablet, , Disp: , Rfl:   ondansetron (ZOFRAN) 8 MG tablet, TK 1 T PO Q 12 H., Disp: , Rfl:   oxybutynin (DITROPAN) 5 MG Tab, Take 1 tablet (5 mg total) by mouth 3 (three) times daily., Disp: 90 tablet, Rfl: 11  oxyCODONE (OXY-IR) 5 mg Cap, Take 2 capsules (10 mg total) by mouth every 4 (four) hours as needed for Pain., Disp: 20 capsule, Rfl: 0   polyethylene glycol (GLYCOLAX) 17 gram/dose powder, Mix 1 capful (17 g) with a liquid and take by mouth once daily., Disp: 238 g, Rfl: 0  pregabalin (LYRICA) 50 MG capsule, Take 1 capsule (50 mg total) by mouth 2 (two) times daily. for 10 days, Disp: 20 capsule, Rfl: 0  PROAIR HFA 90 mcg/actuation inhaler, Inhale 1-2 puffs into the lungs every 6 (six) hours as needed for Wheezing or Shortness of Breath. Rescue, Disp: 18 g, Rfl: 0        Review of Systems   Constitutional: Negative for activity change and appetite change.   HENT: Negative for facial swelling and trouble swallowing.    Eyes: Negative for visual disturbance.   Respiratory: Negative for chest tightness and shortness of breath.    Cardiovascular: Negative for chest pain and palpitations.   Gastrointestinal: Negative.  Negative for abdominal pain, constipation, diarrhea, nausea and vomiting.   Genitourinary: Positive for penile pain. Negative for difficulty urinating, dysuria, flank pain, hematuria, penile swelling, scrotal swelling and testicular pain.        Edmond draining well     Musculoskeletal: Negative for back pain, gait problem, myalgias and neck stiffness.   Skin: Negative for rash.   Neurological: Negative for dizziness and speech difficulty.   Hematological: Does not bruise/bleed easily.   Psychiatric/Behavioral: Negative for behavioral problems.       Objective:      Physical Exam   Nursing note and vitals reviewed.  Constitutional: He is oriented to person, place, and time. Vital signs are normal. He appears well-developed and well-nourished. He is active and cooperative.  Non-toxic appearance. He does not have a sickly appearance.   HENT:   Head: Normocephalic and atraumatic.   Right Ear: External ear normal.   Left Ear: External ear normal.   Nose: Nose normal.   Mouth/Throat: Mucous membranes are normal.   Eyes: Conjunctivae and lids are normal. No scleral icterus.   Neck: Trachea normal, normal range of motion and full passive range of  motion without pain. Neck supple. No JVD present. No tracheal deviation present.   Cardiovascular: Normal rate, S1 normal and S2 normal.    Pulmonary/Chest: Effort normal. No respiratory distress. He exhibits no tenderness.   Abdominal: Soft. Normal appearance. There is no hepatosplenomegaly. There is no tenderness. There is no CVA tenderness.   Musculoskeletal: Normal range of motion.   Neurological: He is alert and oriented to person, place, and time. He has normal strength.   Skin: Skin is warm, dry and intact.     Psychiatric: He has a normal mood and affect. His behavior is normal. Judgment and thought content normal.       Assessment:       1. Post-operative state    2. Encounter for Rondon catheter removal        Plan:         I spent 20 minutes with the patient of which more than half was spent in direct consultation with the patient in regards to our treatment and plan.    Education and recommendations of today's plan of care including home remedies.  VT passed in the office.  We discussed the post op expectations as well as the expectations after the rondon removal  He still has a stent in place; discussed expectations.  Good water intake.  Activity and diet recommendations discussed;   Bladder irritants such as coffee/tea/alcohol can increase LUTS as well as stent in place.  Any problems; let us know  Stent removal set for 4/2/20

## 2020-03-09 NOTE — LETTER
March 9, 2020      Fernie Garcia III, MD  [address]  [place] [address]           [facility] - Urology 4th Floor  [address]  [place] [address]  Phone: [phone]          Patient: Fernie Rice   MR Number: [id number]   YOB: 2000   Date of Visit: 3/9/2020       Dear Dr. Fernie Garcia III:    Thank you for referring Fernie Rice to me for evaluation. Attached you will find relevant portions of my assessment and plan of care.    If you have questions, please do not hesitate to call me. I look forward to following Fernie Rice along with you.    Sincerely,    Wanda Hickey, NP    Enclosure  CC:  No Recipients    If you would like to receive this communication electronically, please contact [email or web address] or [phone] to request more information on [employer] access.    For providers and/or their staff who would like to refer a patient to [facility], please contact us through our one-stop-shop provider referral line, [facility], at [phone].    If you feel you have received this communication in error or would no longer like to receive these types of communications, please e-mail [email or web address]

## 2020-03-23 NOTE — ANESTHESIA PAT ROS NOTE
03/23/2020  Fernie Rice is a 19 y.o., male.      Pre-op Assessment         Review of Systems  Anesthesia Hx:  No problems with previous Anesthesia  History of prior surgery of interest to airway management or planning: Previous anesthesia: General 3/2/20 robotic assisted laparoscopic reimplantation of ureter with general anesthesia.    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   Hepatic/GI:   Hx of esophageal obstruction per chart   Musculoskeletal:  Musculoskeletal Normal    Neurological:  Neurology Normal    Dermatological:   eczema   Psych:   Hx ADHD  Hx moderate THC use per chart            Anesthesia Assessment: Preoperative EQUATION    Planned Procedure: Procedure(s) (LRB):  CYSTOSCOPY, WITH URETERAL STENT REMOVAL (Right)  Requested Anesthesia Type:General/MAC  Surgeon: Chace Prado MD  Service: Urology  Known or anticipated Date of Surgery:4/2/2020    Surgeon notes: reviewed    Electronic QUestionnaire Assessment completed via nurse interview with patient.        Triage considerations:     The patient has no apparent active cardiac condition (No unstable coronary Syndrome such as severe unstable angina or recent [<1 month] myocardial infarction, decompensated CHF, severe valvular   disease or significant arrhythmia)    Previous anesthesia records:GETA  3/2/20  Mask Difficulty Assessment: easy mask   Final Endotracheal Airway: cuffed (inflated to minimal occlusive pressure)   Technique Used For Successful Placement: oral endotracheal tube   ETT Size (mm): 7.5    Number of Attempts at Approach: 1       Last PCP note: outside [facility]   Subspecialty notes: Dermatology, Gastroenterology, Urology    Other important co-morbidities: hx eczema/ hx tobacco abuse/ (per chart moderate THC dependence) ADHD      Tests already available:  Available tests,  within 3 months , within [facility] .             Instructions given. (See in Nurse's note)    Optimization:  Recent procedure 3/2/20 reimplantation of ureter    Plan:    Testing:  none   Pre-anesthesia  visit       Visit focus: none     Consultation:none       Navigation:                Straight Line to surgery.               No tests, anesthesia preop clinic visit, or consult required.

## 2020-04-01 ENCOUNTER — TELEPHONE (OUTPATIENT)
Dept: UROLOGY | Facility: CLINIC | Age: 20
End: 2020-04-01

## 2020-04-01 NOTE — TELEPHONE ENCOUNTER
Spoke to patient's mother.  If he was tolerating the stent, it's not unreasonable to postpone his surgery.  She states he is in a lot of pain.  She will discuss the issue with him.  She does have some concern about covid 19 exposure.  They will notify us later today about their decision to proceed or postpone the stent removal tomorrow.

## 2020-04-22 ENCOUNTER — TELEPHONE (OUTPATIENT)
Dept: UROLOGY | Facility: CLINIC | Age: 20
End: 2020-04-22

## 2020-04-22 DIAGNOSIS — Q62.5 DUPLICATED RIGHT RENAL COLLECTING SYSTEM: Primary | ICD-10-CM

## 2020-04-22 RX ORDER — KETOROLAC TROMETHAMINE 10 MG/1
10 TABLET, FILM COATED ORAL EVERY 6 HOURS PRN
Qty: 15 TABLET | Refills: 0 | Status: SHIPPED | OUTPATIENT
Start: 2020-04-22 | End: 2020-04-27

## 2020-04-22 RX ORDER — TAMSULOSIN HYDROCHLORIDE 0.4 MG/1
0.4 CAPSULE ORAL
Qty: 30 CAPSULE | Refills: 0 | Status: ON HOLD | OUTPATIENT
Start: 2020-04-22 | End: 2023-02-08 | Stop reason: HOSPADM

## 2020-04-22 NOTE — TELEPHONE ENCOUNTER
Patient needs to be booked for a cystoscopy and ureteral stent removal in the operating room.  He was previously scheduled but it was canceled due to COVID-19 concerns.  He is having persistent pain and hematuria.  He needs a COVID test which was ordered as well as a urine culture which was also ordered.  I have sent Toradol and Flomax to his pharmacy after discussing his stent discomfort with his mother.  Please reach out to arrange these tests as well as set him up for a cystoscopy and stent removal.  Thank you.

## 2020-04-22 NOTE — TELEPHONE ENCOUNTER
----- Message from Wyatt Hemphill LPN sent at 4/21/2020 12:52 PM CDT -----  Contact: [phone]      ----- Message -----  From: Clare Ward LPN  Sent: 4/20/2020   3:16 PM CDT  To: Wyatt Hemphill LPN        ----- Message -----  From: Heather Stanley  Sent: 4/20/2020   2:51 PM CDT  To: Johan Mas Staff    Calling to get an appointment due to stent not being removed last month and patient has blood in urine and being in pain. Please call

## 2020-04-23 ENCOUNTER — LAB VISIT (OUTPATIENT)
Dept: INTERNAL MEDICINE | Facility: CLINIC | Age: 20
End: 2020-04-23
Payer: COMMERCIAL

## 2020-04-23 DIAGNOSIS — Q62.5 DUPLICATED RIGHT RENAL COLLECTING SYSTEM: ICD-10-CM

## 2020-04-23 LAB — SARS-COV-2 RNA RESP QL NAA+PROBE: NOT DETECTED

## 2020-04-23 PROCEDURE — U0002 COVID-19 LAB TEST NON-CDC: HCPCS

## 2020-04-24 ENCOUNTER — TELEPHONE (OUTPATIENT)
Dept: UROLOGY | Facility: CLINIC | Age: 20
End: 2020-04-24

## 2020-04-24 NOTE — TELEPHONE ENCOUNTER
----- Message from Mark Herrera MD sent at 4/24/2020  2:38 PM CDT -----  Contact: Megan ( mom ) @ [phone]  Test was negative. You can let the patient know if they are interested or concerned.  ----- Message -----  From: Wyatt Hemphill LPN  Sent: 4/24/2020   2:36 PM CDT  To: Mark Herrera MD        ----- Message -----  From: Damien Nguyen  Sent: 4/24/2020   2:14 PM CDT  To: Johan Mas Staff    Caller calling to discuss to CoVid test results, pls call

## 2020-05-04 ENCOUNTER — TELEPHONE (OUTPATIENT)
Dept: UROLOGY | Facility: CLINIC | Age: 20
End: 2020-05-04

## 2020-05-04 DIAGNOSIS — Q62.5 DUPLICATED RIGHT RENAL COLLECTING SYSTEM: Primary | ICD-10-CM

## 2020-05-05 ENCOUNTER — LAB VISIT (OUTPATIENT)
Dept: INTERNAL MEDICINE | Facility: CLINIC | Age: 20
End: 2020-05-05
Payer: COMMERCIAL

## 2020-05-05 DIAGNOSIS — Q62.5 DUPLICATED RIGHT RENAL COLLECTING SYSTEM: ICD-10-CM

## 2020-05-05 LAB — SARS-COV-2 RNA RESP QL NAA+PROBE: NOT DETECTED

## 2020-05-05 PROCEDURE — U0002 COVID-19 LAB TEST NON-CDC: HCPCS

## 2020-05-06 ENCOUNTER — TELEPHONE (OUTPATIENT)
Dept: UROLOGY | Facility: CLINIC | Age: 20
End: 2020-05-06

## 2020-05-07 ENCOUNTER — HOSPITAL ENCOUNTER (OUTPATIENT)
Facility: HOSPITAL | Age: 20
Discharge: HOME | End: 2020-05-07
Attending: UROLOGY | Admitting: UROLOGY
Payer: COMMERCIAL

## 2020-05-07 ENCOUNTER — ANESTHESIA EVENT (OUTPATIENT)
Dept: SURGERY | Facility: HOSPITAL | Age: 20
End: 2020-05-07
Payer: COMMERCIAL

## 2020-05-07 ENCOUNTER — ANESTHESIA (OUTPATIENT)
Dept: SURGERY | Facility: HOSPITAL | Age: 20
End: 2020-05-07
Payer: COMMERCIAL

## 2020-05-07 VITALS
RESPIRATION RATE: 20 BRPM | TEMPERATURE: 98 F | HEIGHT: 69 IN | OXYGEN SATURATION: 100 % | BODY MASS INDEX: 22.96 KG/M2 | WEIGHT: 155 LBS | HEART RATE: 72 BPM | DIASTOLIC BLOOD PRESSURE: 70 MMHG | SYSTOLIC BLOOD PRESSURE: 122 MMHG

## 2020-05-07 DIAGNOSIS — N20.0 KIDNEY STONE: ICD-10-CM

## 2020-05-07 DIAGNOSIS — Z98.890 S/P URETERAL REIMPLANTATION: ICD-10-CM

## 2020-05-07 PROCEDURE — 63600175 PHARM REV CODE 636 W HCPCS: Performed by: NURSE ANESTHETIST, CERTIFIED REGISTERED

## 2020-05-07 PROCEDURE — 63600175 PHARM REV CODE 636 W HCPCS: Performed by: STUDENT IN AN ORGANIZED HEALTH CARE EDUCATION/TRAINING PROGRAM

## 2020-05-07 PROCEDURE — 36000707: Performed by: UROLOGY

## 2020-05-07 PROCEDURE — 37000008 HC ANESTHESIA 1ST 15 MINUTES: Performed by: UROLOGY

## 2020-05-07 PROCEDURE — 37000009 HC ANESTHESIA EA ADD 15 MINS: Performed by: UROLOGY

## 2020-05-07 PROCEDURE — 71000016 HC POSTOP RECOV ADDL HR: Performed by: UROLOGY

## 2020-05-07 PROCEDURE — 25000003 PHARM REV CODE 250: Performed by: STUDENT IN AN ORGANIZED HEALTH CARE EDUCATION/TRAINING PROGRAM

## 2020-05-07 PROCEDURE — 71000044 HC DOSC ROUTINE RECOVERY FIRST HOUR: Performed by: UROLOGY

## 2020-05-07 PROCEDURE — D9220A PRA ANESTHESIA: Mod: CRNA,,, | Performed by: NURSE ANESTHETIST, CERTIFIED REGISTERED

## 2020-05-07 PROCEDURE — 63600175 PHARM REV CODE 636 W HCPCS: Performed by: ANESTHESIOLOGY

## 2020-05-07 PROCEDURE — 52310 PR CYSTOSCOPY,REMV CALCULUS,SIMPLE: ICD-10-PCS | Mod: ,,, | Performed by: UROLOGY

## 2020-05-07 PROCEDURE — 71000015 HC POSTOP RECOV 1ST HR: Performed by: UROLOGY

## 2020-05-07 PROCEDURE — D9220A PRA ANESTHESIA: ICD-10-PCS | Mod: ANES,,, | Performed by: ANESTHESIOLOGY

## 2020-05-07 PROCEDURE — D9220A PRA ANESTHESIA: Mod: ANES,,, | Performed by: ANESTHESIOLOGY

## 2020-05-07 PROCEDURE — 25000003 PHARM REV CODE 250: Performed by: NURSE ANESTHETIST, CERTIFIED REGISTERED

## 2020-05-07 PROCEDURE — 36000706: Performed by: UROLOGY

## 2020-05-07 PROCEDURE — D9220A PRA ANESTHESIA: ICD-10-PCS | Mod: CRNA,,, | Performed by: NURSE ANESTHETIST, CERTIFIED REGISTERED

## 2020-05-07 PROCEDURE — 52310 CYSTOSCOPY AND TREATMENT: CPT | Mod: ,,, | Performed by: UROLOGY

## 2020-05-07 RX ORDER — CEFAZOLIN SODIUM 1 G/3ML
2 INJECTION, POWDER, FOR SOLUTION INTRAMUSCULAR; INTRAVENOUS
Status: COMPLETED | OUTPATIENT
Start: 2020-05-07 | End: 2020-05-07

## 2020-05-07 RX ORDER — FENTANYL CITRATE 50 UG/ML
25 INJECTION, SOLUTION INTRAMUSCULAR; INTRAVENOUS EVERY 5 MIN PRN
Status: DISCONTINUED | OUTPATIENT
Start: 2020-05-07 | End: 2020-05-07 | Stop reason: HOSPADM

## 2020-05-07 RX ORDER — PROPOFOL 10 MG/ML
VIAL (ML) INTRAVENOUS CONTINUOUS PRN
Status: DISCONTINUED | OUTPATIENT
Start: 2020-05-07 | End: 2020-05-07

## 2020-05-07 RX ORDER — MIDAZOLAM HYDROCHLORIDE 1 MG/ML
INJECTION, SOLUTION INTRAMUSCULAR; INTRAVENOUS
Status: DISCONTINUED | OUTPATIENT
Start: 2020-05-07 | End: 2020-05-07

## 2020-05-07 RX ORDER — LIDOCAINE HYDROCHLORIDE 20 MG/ML
INJECTION INTRAVENOUS
Status: DISCONTINUED | OUTPATIENT
Start: 2020-05-07 | End: 2020-05-07

## 2020-05-07 RX ORDER — KETAMINE HCL IN 0.9 % NACL 50 MG/5 ML
SYRINGE (ML) INTRAVENOUS
Status: DISCONTINUED | OUTPATIENT
Start: 2020-05-07 | End: 2020-05-07

## 2020-05-07 RX ORDER — FENTANYL CITRATE 50 UG/ML
INJECTION, SOLUTION INTRAMUSCULAR; INTRAVENOUS
Status: DISCONTINUED | OUTPATIENT
Start: 2020-05-07 | End: 2020-05-07

## 2020-05-07 RX ORDER — PROPOFOL 10 MG/ML
VIAL (ML) INTRAVENOUS
Status: DISCONTINUED | OUTPATIENT
Start: 2020-05-07 | End: 2020-05-07

## 2020-05-07 RX ORDER — SODIUM CHLORIDE 9 MG/ML
INJECTION, SOLUTION INTRAVENOUS CONTINUOUS
Status: ACTIVE | OUTPATIENT
Start: 2020-05-07

## 2020-05-07 RX ADMIN — FENTANYL CITRATE 25 MCG: 50 INJECTION INTRAMUSCULAR; INTRAVENOUS at 08:05

## 2020-05-07 RX ADMIN — PROPOFOL 50 MG: 10 INJECTION, EMULSION INTRAVENOUS at 07:05

## 2020-05-07 RX ADMIN — FENTANYL CITRATE 50 MCG: 50 INJECTION, SOLUTION INTRAMUSCULAR; INTRAVENOUS at 07:05

## 2020-05-07 RX ADMIN — CEFAZOLIN 2 G: 330 INJECTION, POWDER, FOR SOLUTION INTRAMUSCULAR; INTRAVENOUS at 07:05

## 2020-05-07 RX ADMIN — SODIUM CHLORIDE: 0.9 INJECTION, SOLUTION INTRAVENOUS at 06:05

## 2020-05-07 RX ADMIN — MIDAZOLAM HYDROCHLORIDE 2 MG: 1 INJECTION, SOLUTION INTRAMUSCULAR; INTRAVENOUS at 07:05

## 2020-05-07 RX ADMIN — Medication 10 MG: at 07:05

## 2020-05-07 RX ADMIN — SODIUM CHLORIDE: 0.9 INJECTION, SOLUTION INTRAVENOUS at 07:05

## 2020-05-07 RX ADMIN — LIDOCAINE HYDROCHLORIDE 100 MG: 20 INJECTION, SOLUTION INTRAVENOUS at 07:05

## 2020-05-07 RX ADMIN — PROPOFOL 200 MCG/KG/MIN: 10 INJECTION, EMULSION INTRAVENOUS at 07:05

## 2020-05-07 NOTE — DISCHARGE INSTRUCTIONS
Cystoscopy       .  After the procedure  If you had a sedative, general anesthesia, or spinal anesthesia, you must have someone drive you home. Once youre home:  · Drink plenty of fluids.  · You may have burning or light bleeding when you urinate--this is normal.  · Medicines may be prescribed to ease any discomfort or prevent infection. Take these as directed.  · Call your doctor if you have heavy bleeding or blood clots, burning that lasts more than a day, a fever over 100°F  (38° C), or trouble urinating.  Date Last Reviewed: 1/1/2017 © 2000-2017 [employer]. [address] [place] [address], [place] [address]. All rights reserved. This information is not intended as a substitute for professional medical care. Always follow your healthcare professional's instructions.      Anesthesia: General Anesthesia     You are watched continuously during your procedure by your anesthesia provider.     Youre due to have surgery. During surgery, youll be given medicine called anesthesia or anesthetic. This will keep you comfortable and pain-free. Your anesthesia provider will use general anesthesia.  What is general anesthesia?  General anesthesia puts you into a state like deep sleep. It goes into the bloodstream (IV anesthetics), into the lungs (gas anesthetics), or both. You feel nothing during the procedure. You will not remember it. During the procedure, the anesthesia provider monitors you continuously. He or she checks your heart rate and rhythm, blood pressure, breathing, and blood oxygen.  · IV anesthetics. IV anesthetics are given through an IV line in your arm. Theyre often given first. This is so you are asleep before a gas anesthetic is started. Some kinds of IV anesthetics relieve pain. Others relax you. Your doctor will decide which kind is best in your case.  · Gas anesthetics. Gas anesthetics are breathed into the lungs. They are often used to keep you asleep. They can be given through a facemask or  a tube placed in your larynx or trachea (breathing tube).  ¨ If you have a facemask, your anesthesia provider will most likely place it over your nose and mouth while youre still awake. Youll breathe oxygen through the mask as your IV anesthetic is started. Gas anesthetic may be added through the mask.  ¨ If you have a tube in the larynx or trachea, it will be inserted into your throat after youre asleep.  Anesthesia tools and medicines  You will likely have:  · IV anesthetics. These are put into an IV line into your bloodstream.  · Gas anesthetics. You breathe these anesthetics into your lungs, where they pass into your bloodstream.  · Pulse oximeter. This is a small clip that is attached to the end of your finger. This measures your blood oxygen level.  · Electrocardiography leads (electrodes). These are small sticky pads that are placed on your chest. They record your heart rate and rhythm.  · Blood pressure cuff. This reads your blood pressure.  Risks and possible complications  General anesthesia has some risks. These include:  · Breathing problems  · Nausea and vomiting  · Sore throat or hoarseness (usually temporary)  · Allergic reaction to the anesthetic  · Irregular heartbeat (rare)  · Cardiac arrest (rare)   Anesthesia safety  · Follow all instructions you are given for how long not to eat or drink before your procedure.  · Be sure your doctor knows what medicines and drugs you take. This includes over-the-counter medicines, herbs, supplements, alcohol or other drugs. You will be asked when those were last taken.  · Have an adult family member or friend drive you home after the procedure.  · For the first 24 hours after your surgery:  ¨ Do not drive or use heavy equipment.  ¨ Do not make important decisions or sign legal documents. If important decisions or signing legal documents is necessary during the first 24 hours after surgery, have a trusted family member or spouse act on your behalf.  ¨ Avoid  alcohol.  ¨ Have a responsible adult stay with you. He or she can watch for problems and help keep you safe.  Date Last Reviewed: 12/1/2016  © 7164-7497 [employer]. [address] [place] [address], [place] [address]. All rights reserved. This information is not intended as a substitute for professional medical care. Always follow your healthcare professional's instructions.

## 2020-05-07 NOTE — ANESTHESIA POSTPROCEDURE EVALUATION
Anesthesia Post Evaluation    Patient: Fernie Rice    Procedure(s) Performed: Procedure(s) (LRB):  CYSTOSCOPY, WITH URETERAL STENT REMOVAL (Right)    Final Anesthesia Type: general    Patient location during evaluation: OPS  Patient participation: Yes- Able to Participate  Level of consciousness: awake and alert  Post-procedure vital signs: reviewed and stable  Pain management: adequate  Airway patency: patent    PONV status at discharge: No PONV  Anesthetic complications: no      Cardiovascular status: blood pressure returned to baseline and stable  Respiratory status: unassisted, spontaneous ventilation and room air  Hydration status: euvolemic  Follow-up not needed.          Vitals Value Taken Time   /70 5/7/2020  9:30 AM   Temp 36.7 °C (98 °F) 5/7/2020  9:30 AM   Pulse 72 5/7/2020  9:30 AM   Resp 20 5/7/2020  9:30 AM   SpO2 100 % 5/7/2020  9:30 AM         No case tracking events are documented in the log.      Pain/Mayur Score: Pain Rating Prior to Med Admin: 6 (5/7/2020  8:54 AM)  Mayur Score: 9 (5/7/2020  7:59 AM)

## 2020-05-07 NOTE — ANESTHESIA PREPROCEDURE EVALUATION
05/07/2020  Fernie Rice is a 19 y.o., male.      Anesthesia Evaluation         Review of Systems  Anesthesia Hx:  No problems with previous Anesthesia History of prior surgery of interest to airway management or planning: Previous anesthesia: General 3/2/20 robotic assisted laparoscopic reimplantation of ureter with general anesthesia.    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   Hepatic/GI:   Hx of esophageal obstruction per chart   Musculoskeletal:  Musculoskeletal Normal    Neurological:  Neurology Normal    Dermatological:   eczema   Psych:   Hx ADHD  Hx moderate THC use per chart         Physical Exam  General:  Well nourished       Chest/Lungs:  Chest/Lungs Findings: Normal Respiratory Rate     Heart/Vascular:  Heart Findings: Rate: Normal        Mental Status:  Mental Status Findings:  Cooperative, Alert and Oriented         Anesthesia Assessment: Preoperative EQUATION    Planned Procedure: Procedure(s) (LRB):  CYSTOSCOPY, WITH URETERAL STENT REMOVAL (Right)  Requested Anesthesia Type:General/MAC  Surgeon: Chace Prado MD  Service: Urology  Known or anticipated Date of Surgery:4/2/2020    Surgeon notes: reviewed    Electronic QUestionnaire Assessment completed via nurse interview with patient.        Triage considerations:     The patient has no apparent active cardiac condition (No unstable coronary Syndrome such as severe unstable angina or recent [<1 month] myocardial infarction, decompensated CHF, severe valvular   disease or significant  arrhythmia)    Previous anesthesia records:GETA  3/2/20  Mask Difficulty Assessment: easy mask   Final Endotracheal Airway: cuffed (inflated to minimal occlusive pressure)   Technique Used For Successful Placement: oral endotracheal tube   ETT Size (mm): 7.5   Number of Attempts at Approach: 1       Last PCP note: outside [facility]   Subspecialty notes: Dermatology, Gastroenterology, Urology    Other important co-morbidities: hx eczema/ hx tobacco abuse/ (per chart moderate THC dependence) ADHD      Tests already available:  Available tests,  within 3 months , within [facility] .             Instructions given. (See in Nurse's note)    Optimization:  Recent procedure 3/2/20 reimplantation of ureter    Plan:    Testing:  none   Pre-anesthesia  visit       Visit focus: none     Consultation:none       Navigation:                Straight Line to surgery.               No tests, anesthesia preop clinic visit, or consult required.      Anesthesia Plan  Type of Anesthesia, risks & benefits discussed:  Anesthesia Type:  general  Patient's Preference: ga  Intra-op Monitoring Plan: standard ASA monitors  Intra-op Monitoring Plan Comments:   Post Op Pain Control Plan: per primary service following discharge from PACU  Post Op Pain Control Plan Comments:   Induction:   IV  Beta Blocker:  Patient is not currently on a Beta-Blocker (No further documentation required).       Informed Consent: Patient understands risks and agrees with Anesthesia plan.  Questions answered. Anesthesia consent signed with patient.  ASA Score: 2     Day of Surgery Review of History & Physical:    H&P update referred to the surgeon.         Ready For Surgery From Anesthesia Perspective.

## 2020-05-07 NOTE — H&P
[facility]  History & Physical     Subjective:     Chief Complaint/Reason for Admission: stent removal    History of Present Illness:   Patient 19 y.o. male presents with an indwelling stent s/p robotic ureteral reimplant.     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 02/03/2020    Discharge planning issues 02/03/2020    Duplicated ureter, right 02/03/2020    Moderate tetrahydrocannabinol (THC) dependence 02/03/2020    Closed displaced fracture of base of fifth metacarpal bone of right hand 01/02/2020    Tobacco abuse 11/05/2019    Keratoconus, stable, right eye 08/21/2019    Acute midline thoracic back pain 08/16/2019    Eczema herpeticum 06/15/2016    Impetigo 06/14/2016    Esophageal obstruction 11/22/2014        Medications Prior to Admission   Medication Sig Dispense Refill Last Dose    dupilumab (DUPIXENT) 300 mg/2 mL Syrg Inject into the skin every 14 (fourteen) days.    5/4/2020 at Unknown time    ibuprofen (ADVIL,MOTRIN) 800 MG tablet Take 800 mg by mouth 3 (three) times daily as needed for Pain.   Past Month at Unknown time    ketorolac (TORADOL) 10 mg tablet Take 1 tablet (10 mg total) by mouth every 6 (six) hours as needed for Pain. 40 tablet 0 5/6/2020 at Unknown time    oxyCODONE (OXY-IR) 5 mg Cap Take 2 capsules (10 mg total) by mouth every 4 (four) hours as needed for Pain. 20 capsule 0 Past Month at Unknown time    polyethylene glycol (GLYCOLAX) 17 gram/dose powder Mix 1 capful (17 g) with a liquid and take by mouth once daily. 238 g 0 Past Month at Unknown time    tamsulosin (FLOMAX) 0.4 mg Cap Take 1 capsule (0.4 mg total) by mouth after dinner. 30 capsule 0 Past Week at Unknown time    acetaminophen (TYLENOL) 500 MG tablet Take 1 tablet (500 mg total) by mouth every 6 (six) hours as  needed for Pain. 40 tablet 0 More than a month at Unknown time    albuterol-ipratropium (DUO-NEB) 2.5 mg-0.5 mg/3 mL nebulizer solution Take 3 mLs by nebulization every 4 (four) hours as needed for Wheezing or Shortness of Breath. Rescue 180 mL 0 More than a month at Unknown time    ondansetron (ZOFRAN) 4 MG tablet    More than a month at Unknown time    ondansetron (ZOFRAN) 8 MG tablet TK 1 T PO Q 12 H.   More than a month at Unknown time    oxybutynin (DITROPAN) 5 MG Tab Take 1 tablet (5 mg total) by mouth 3 (three) times daily. 90 tablet 11     pregabalin (LYRICA) 50 MG capsule Take 1 capsule (50 mg total) by mouth 2 (two) times daily. for 10 days 20 capsule 0 More than a month at Unknown time    PROAIR HFA 90 mcg/actuation inhaler Inhale 1-2 puffs into the lungs every 6 (six) hours as needed for Wheezing or Shortness of Breath. Rescue 18 g 0 5/5/2020     Review of patient's allergies indicates:   Allergen Reactions    Fish containing products Other (See Comments)    Peanut Anaphylaxis    Amoxicillin Hives     Tolerates ceftriaxone and cefepime    Bactrim [sulfamethoxazole-trimethoprim] Hives    Corn containing products Other (See Comments)    Soy fiber     Soy flour Dermatitis        Past Medical History:   Diagnosis Date    ADHD (attention deficit hyperactivity disorder)     Asthma     Eczema     Esophagitis     Food impaction of esophagus     Multiple food allergies     Vernal keratoconjunctivitis       Past Surgical History:   Procedure Laterality Date    ADENOIDECTOMY      ANTEGRADE NEPHROSTOGRAPHY Right 2/14/2020    Procedure: NEPHROSTOGRAM, ANTEGRADE;  Surgeon: Chace Prado MD;  Location: [address];  Service: Urology;  Laterality: Right;    CLOSED REDUCTION OF INJURY OF HAND WITH PERCUTANEOUS PINNING Right 1/2/2020    Procedure: CLOSED REDUCTION, HAND, WITH PERCUTANEOUS PINNING;  Surgeon: Chace Jerez MD;  Location: [facility];  Service: Orthopedics;  Laterality: Right;  "   CYSTOGRAM N/A 2/14/2020    Procedure: CYSTOGRAM;  Surgeon: Chace Prado MD;  Location: [facility] OR 1ST FLR;  Service: Urology;  Laterality: N/A;    CYSTOSCOPY N/A 2/14/2020    Procedure: CYSTOSCOPY;  Surgeon: Chace Prado MD;  Location: [facility] OR 1ST FLR;  Service: Urology;  Laterality: N/A;  75 min    EYE SURGERY      ulcers removed    REPLACEMENT OF NEPHROSTOMY TUBE Right 2/14/2020    Procedure: REMOVAL, NEPHROSTOMY TUBE;  Surgeon: Chace Prado MD;  Location: [facility] OR 1ST FLR;  Service: Urology;  Laterality: Right;    RETROGRADE PYELOGRAPHY Right 2/14/2020    Procedure: PYELOGRAM, RETROGRADE;  Surgeon: Chace Prado MD;  [facility];  Service: Urology;  Laterality: Right;    ROBOT-ASSISTED LAPAROSCOPIC REIMPLANTATION OF URETER USING DA URI XI Right 3/2/2020    Procedure: XI ROBOTIC REIMPLANTATION, URETER;  Surgeon: Chace Prado MD;  Location: [facility] OR 2ND FLR;  Service: Urology;  Laterality: Right;  3hrs    SINUS SURGERY        History reviewed. No pertinent family history.   Social History     Tobacco Use    Smoking status: Current Some Day Smoker    Smokeless tobacco: Never Used   Substance Use Topics    Alcohol use: Yes     Alcohol/week: 20.0 standard drinks     Types: 20 Cans of beer per week        Review of Systems      Physical Exam      OBJECTIVE:     Patient Vitals for the past 8 hrs:   BP Temp Temp src Pulse Resp SpO2 Height Weight   05/07/20 0613 138/69 98.4 °F (36.9 °C) Oral 70 18 99 % 5' 9" (1.753 m) 70.3 kg (155 lb)     /69 (BP Location: Left arm, Patient Position: Lying)   Pulse 70   Temp 98.4 °F (36.9 °C) (Oral)   Resp 18   Ht 5' 9" (1.753 m)   Wt 70.3 kg (155 lb)   SpO2 99%   BMI 22.89 kg/m²     General Appearance:    Alert, cooperative, no distress, appears stated age   Head:    Normocephalic, without obvious abnormality, atraumatic   Eyes:    PERRL, conjunctiva/corneas clear, EOM's intact, fundi     benign, both eyes        Ears:    " Normal TM's and external ear canals, both ears   Nose:   Nares normal, septum midline, mucosa normal, no drainage    or sinus tenderness   Throat:   Lips, mucosa, and tongue normal; teeth and gums normal   Neck:   Supple, symmetrical, trachea midline, no adenopathy;        thyroid:  No enlargement/tenderness/nodules; no carotid    bruit or JVD   Back:     Symmetric, no curvature, ROM normal, no CVA tenderness   Lungs:     Clear to auscultation bilaterally, respirations unlabored   Chest wall:    No tenderness or deformity   Heart:    Regular rate and rhythm, S1 and S2 normal, no murmur, rub   or gallop   Abdomen:     Soft, non-tender, bowel sounds active all four quadrants,     no masses, no organomegaly   Genitalia:    Normal male without lesion, discharge or tenderness   Rectal:    Normal tone, normal prostate, no masses or tenderness;    guaiac negative stool   Extremities:   Extremities normal, atraumatic, no cyanosis or edema   Pulses:   2+ and symmetric all extremities   Skin:   Skin color, texture, turgor normal, no rashes or lesions   Lymph nodes:   Cervical, supraclavicular, and axillary nodes normal   Neurologic:   CNII-XII intact. Normal strength, sensation and reflexes       throughout       Data Review:  labs reviewed    ASSESSMENT/PLAN:     Active Hospital Problems    Diagnosis  POA    S/P ureteral reimplantation [Z98.890]  Not Applicable      Resolved Hospital Problems   No resolved problems to display.       Plan:  -I have explained the indication, risks, benefits, and alternatives of the procedure in detail.  The patient voices understanding and all questions have been answered.  The patient agrees to proceed as planned with cystoscopy and stent removal.

## 2020-05-07 NOTE — DISCHARGE SUMMARY
[facility]  Discharge Note  Short Stay    Admit Date: 5/7/2020    Discharge Date and Time: 05/07/2020 7:31 AM      Attending Physician: Chace Prado MD     Discharge Provider: Celia Valdes MD    Diagnoses:  Active Hospital Problems    Diagnosis  POA    *S/P ureteral reimplantation [Z98.890]  Not Applicable      Resolved Hospital Problems   No resolved problems to display.       Discharged Condition: stable    Hospital Course: Patient was admitted for removal of right ureteral stent and tolerated the procedure well with no complications. He was discharged home in good condition on the same day.       Final Diagnoses: Same as principal problem.    Disposition: Home or Self Care    Follow up/Patient Instructions:    Medications:  Reconciled Home Medications:   Current Discharge Medication List      CONTINUE these medications which have NOT CHANGED    Details   dupilumab (DUPIXENT) 300 mg/2 mL Syrg Inject into the skin every 14 (fourteen) days.       ibuprofen (ADVIL,MOTRIN) 800 MG tablet Take 800 mg by mouth 3 (three) times daily as needed for Pain.      ketorolac (TORADOL) 10 mg tablet Take 1 tablet (10 mg total) by mouth every 6 (six) hours as needed for Pain.  Qty: 40 tablet, Refills: 0      oxyCODONE (OXY-IR) 5 mg Cap Take 2 capsules (10 mg total) by mouth every 4 (four) hours as needed for Pain.  Qty: 20 capsule, Refills: 0    Comments: Quantity prescribed more than 7 day supply? No      polyethylene glycol (GLYCOLAX) 17 gram/dose powder Mix 1 capful (17 g) with a liquid and take by mouth once daily.  Qty: 238 g, Refills: 0      tamsulosin (FLOMAX) 0.4 mg Cap Take 1 capsule (0.4 mg total) by mouth after dinner.  Qty: 30 capsule, Refills: 0      acetaminophen (TYLENOL) 500 MG tablet Take 1 tablet (500 mg total) by mouth every 6 (six) hours as needed for Pain.  Qty: 40 tablet, Refills: 0      albuterol-ipratropium (DUO-NEB) 2.5 mg-0.5 mg/3 mL nebulizer solution Take 3 mLs by nebulization every 4  (four) hours as needed for Wheezing or Shortness of Breath. Rescue  Qty: 180 mL, Refills: 0      !! ondansetron (ZOFRAN) 4 MG tablet       !! ondansetron (ZOFRAN) 8 MG tablet TK 1 T PO Q 12 H.      oxybutynin (DITROPAN) 5 MG Tab Take 1 tablet (5 mg total) by mouth 3 (three) times daily.  Qty: 90 tablet, Refills: 11      pregabalin (LYRICA) 50 MG capsule Take 1 capsule (50 mg total) by mouth 2 (two) times daily. for 10 days  Qty: 20 capsule, Refills: 0      PROAIR HFA 90 mcg/actuation inhaler Inhale 1-2 puffs into the lungs every 6 (six) hours as needed for Wheezing or Shortness of Breath. Rescue  Qty: 18 g, Refills: 0       !! - Potential duplicate medications found. Please discuss with provider.        No discharge procedures on file.  Follow-up Information     Chace Prado MD In 3 months.    Specialty:  Urology  Contact information:  [facility]  [facility] [address] [phone]

## 2020-05-07 NOTE — OP NOTE
[facility]  Operative Note    Date: 05/07/2020    Pre-Op Diagnosis: retained right JJ ureteral stent s/p right ureteroneocystostomy     Post-Op Diagnosis: same    Procedure(s) Performed:   1.  Cysto with right JJ ureteral stent removal  2.  Fluoro < 1 h    Specimen(s): none    Staff Surgeon: Chace Prado MD    Assistant Surgeon: Celia Valdes MD    Anesthesia: General mask inhalational anesthesia    Indications: Fernie Rice is a 19 y.o. male with a retained right JJ ureteral stent s/p right ureteroneocystostomy     Findings: Normal cystoscopy. Right ureteral stent in bladder.     Estimated Blood Loss: min    Drains: none    Procedure in Detail:  After risks, benefits and possible complications of the procedure were explained, the patient elected to undergo the procedure and informed consent was obtained. All questions were answered in the alix-operative area. The patient was transferred to the cystoscopy suite and placed in the supine position.  SCDs were applied and working.  Anesthesia was administered.  Once the patient was adequately sedated, he was placed in the dorsal lithotomy position and prepped and draped in the usual sterile fashion.  Time out was performed, alix-procedural antibiotics were confirmed.     A rigid cystoscope in a 22 Fr sheath was introduced into the bladder per urethra. This passed easily.  The entire urethra was visualized which showed no masses or strictures.  The right and left ureteral orifices were identified in the normal anatomic position and were seen effluxing clear urine.  Formal cystoscopy was performed, which revealed no masses or lesions suspicious for malignancy, no trabeculations, no bladder stones and no bladder diverticuli.  The bladder was drained, and the patient was removed from lithotomy.     A stent grasper was introduced through the cystoscope and the stent was removed.     The patient tolerated the procedure well and was transferred to  recovery in stable condition.    Disposition:  The patient will follow up with Dr. Prado in 3 weeks.     Celia Valdes MD

## 2020-05-07 NOTE — TRANSFER OF CARE
"Anesthesia Transfer of Care Note    Patient: Fernie Rice    Procedure(s) Performed: Procedure(s) (LRB):  CYSTOSCOPY, WITH URETERAL STENT REMOVAL (Right)    Patient location: PACU    Anesthesia Type: general    Transport from OR: Transported from OR on 6-10 L/min O2 by face mask with adequate spontaneous ventilation    Post pain: adequate analgesia    Post assessment: no apparent anesthetic complications    Post vital signs: stable    Level of consciousness: sedated    Nausea/Vomiting: no nausea/vomiting    Complications: none    Transfer of care protocol was followed      Last vitals:   Visit Vitals  /69 (BP Location: Left arm, Patient Position: Lying)   Pulse 74   Temp 36.9 °C (98.4 °F) (Oral)   Resp 18   Ht 5' 9" (1.753 m)   Wt 70.3 kg (155 lb)   SpO2 97%   BMI 22.89 kg/m²     "

## 2021-05-24 ENCOUNTER — OFFICE VISIT (OUTPATIENT)
Dept: URGENT CARE | Facility: CLINIC | Age: 21
End: 2021-05-24
Payer: COMMERCIAL

## 2021-05-24 VITALS
OXYGEN SATURATION: 97 % | BODY MASS INDEX: 23.7 KG/M2 | HEART RATE: 95 BPM | SYSTOLIC BLOOD PRESSURE: 131 MMHG | WEIGHT: 160 LBS | DIASTOLIC BLOOD PRESSURE: 80 MMHG | HEIGHT: 69 IN | TEMPERATURE: 99 F | RESPIRATION RATE: 18 BRPM

## 2021-05-24 DIAGNOSIS — M54.2 NECK PAIN, ACUTE: Primary | ICD-10-CM

## 2021-05-24 DIAGNOSIS — S13.4XXA WHIPLASH INJURY TO NECK, INITIAL ENCOUNTER: ICD-10-CM
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
## 2021-05-24 PROCEDURE — 3008F BODY MASS INDEX DOCD: CPT | Mod: CPTII,S$GLB,, | Performed by: NURSE PRACTITIONER

## 2021-05-24 PROCEDURE — 99214 OFFICE O/P EST MOD 30 MIN: CPT | Mod: 25,S$GLB,, | Performed by: NURSE PRACTITIONER

## 2021-05-24 PROCEDURE — 3008F PR BODY MASS INDEX (BMI) DOCUMENTED: ICD-10-PCS | Mod: CPTII,S$GLB,, | Performed by: NURSE PRACTITIONER

## 2021-05-24 PROCEDURE — 96372 THER/PROPH/DIAG INJ SC/IM: CPT | Mod: S$GLB,,, | Performed by: NURSE PRACTITIONER

## 2021-05-24 PROCEDURE — 99214 PR OFFICE/OUTPT VISIT, EST, LEVL IV, 30-39 MIN: ICD-10-PCS | Mod: 25,S$GLB,, | Performed by: NURSE PRACTITIONER

## 2021-05-24 PROCEDURE — 96372 PR INJECTION,THERAP/PROPH/DIAG2ST, IM OR SUBCUT: ICD-10-PCS | Mod: S$GLB,,, | Performed by: NURSE PRACTITIONER

## 2021-05-24 RX ORDER — KETOROLAC TROMETHAMINE 30 MG/ML
30 INJECTION, SOLUTION INTRAMUSCULAR; INTRAVENOUS
Status: COMPLETED | OUTPATIENT
Start: 2021-05-24 | End: 2021-05-24

## 2021-05-24 RX ORDER — NAPROXEN 500 MG/1
500 TABLET ORAL 2 TIMES DAILY WITH MEALS
Qty: 20 TABLET | Refills: 0 | Status: SHIPPED | OUTPATIENT
Start: 2021-05-24 | End: 2021-06-03

## 2021-05-24 RX ORDER — METHOCARBAMOL 500 MG/1
500 TABLET, FILM COATED ORAL 4 TIMES DAILY
Qty: 40 TABLET | Refills: 0 | Status: SHIPPED | OUTPATIENT
Start: 2021-05-24 | End: 2021-06-03

## 2021-05-24 RX ADMIN — KETOROLAC TROMETHAMINE 30 MG: 30 INJECTION, SOLUTION INTRAMUSCULAR; INTRAVENOUS at 05:05

## 2022-09-30 NOTE — PRE ADMISSION SCREENING
Occupational Therapy  12T  Visit Type: treatment  Precautions:  Medical precautions:  fall risk;. Admitted for diarrhea, nausea and concerns for GVHD  PMH of AML s/p allogeneic stem cell transplant   C-Diff (+) on 6/25  Syncope episode on 7/12 & 9/23    RN aware of session  Lines:     Basic: IV, PICC and indwelling urinary catheter      Lines in chart and on patient reviewed, precautions maintained throughout session.  Safety Measures: bed alarm (call light)    SUBJECTIVE  Patient agreed to participate in therapy this date.  Pt stated \"I will work with you but its been busy.\"  Patient / Family Goal: maximize function    OBJECTIVE   Level of consciousness: alert    Oriented to person, place and time     Arousal alertness: delayed responses to stimuli    Affect/Behavior: cooperative, appropriate, alert, confused, calm, pleasant and flat  Patient activity tolerance: 1 to 2 activity to rest  Functional Communication/Cognition    Overall status:  Impaired    Form of communication:  Verbal     Attention span:  Attends with cues to redirect    Attention Span Impairment: fatigue and reduced memory    Commands: follows one step commands with repetition.    Transition between tasks: transition with cues.    Safety judgement: decreased awareness of need for assistance.    Awareness of deficits: assistance required to compensate for deficits and decreased awareness of deficits.  Edema:  Significantly increased level of edema in BUEs/BLEs impacting patient's ability to engage in transfers & exercises with writer.  Bed mobility:      Repositioning in bed: total assist - dependent     Supine to sit: total assist - non-dependent, 2 persons, with tactile cues and with verbal cues (MOD A x2)    Sit to supine: total assist - non-dependent, 2 persons, with verbal cues and with tactile cues (MAX A x2)  Training completed:    Tasks: supine to sit and sit to supine    Education details: patient safety and body mechanics  Activities of  Patient told PreAdmit  (Tanya) that he had the flu and would be cancelling his surgery.  Patient will call Dr. Jerez's office to reschedule.  Message left on Lucila's voicemail to inform.   Daily Living (ADLs):  Activities of daily living training:   Pt deferred all ADLs as she completed most with CNAs this morning.      Interventions     Shoulder extension: bilateral, seated, AROM, 10 reps, 1 sets   Shoulder flexion: bilateral, seated, AROM, 10 reps, 1 sets   Scapular retraction: bilateral, seated, AROM, 10 reps, 1 sets   Shoulder horizontal abduction: bilateral, seated, AROM, 10 reps, 1 sets   Elbow extension: bilateral, seated, AROM, 10 reps, 1 sets   Elbow flexion: bilateral, seated, AROM, 10 reps, 1 sets   Forearm pronation: bilateral, seated, AROM, 10 reps, 1 sets   Forearm supination: bilateral, seated, AROM, 10 reps, 1 sets   Wrist extension: bilateral, seated, AROM, 10 reps, 1 sets   Wrist flexion: bilateral, seated, AROM, 10 reps, 1 sets   Wrist ulnar deviation: bilateral, seated, AROM, 10 reps, 1 sets   Wrist radial deviation: bilateral, seated, AROM, 10 reps, 1 sets   Hand grasp/release: bilateral, seated, AROM, 10 reps, 1 sets    Additional exercise details: Significantly limited in shoulder flexion/extesion motion due to profound weakness and edema noted in BUEs. RN aware of limitation.  Skilled input: verbal instruction/cues  Verbal Consent: Writer verbally educated and received verbal consent for hand placement, positioning of patient, and techniques to be performed today from patient for therapist position for techniques as described above and how they are pertinent to the patient's plan of care.        ASSESSMENT  Impairments: activity tolerance, safety awareness, strength, bed mobility, cognitive and balance  Functional Limitations: functional transfers, bathing, toileting, dressing, showering and functional mobility       Discharge Recommendations:  Recommendation for Discharge Location: OT WI: Post-acute facility with therapy needs  Recommendation for Discharge Support: OT WI: 24 Hour assist, Assistance with medication, Assistance with IADLs  PT/OT Mobility Equipment for Discharge:  No needs with CON[facility]ANTINE dc  PT/OT ADL Equipment for Discharge: no needs with [facility] dc  OT Identified Barriers to Discharge: medical, safety, SOB, deconditioning, cognition, weakness    Progress: slow progress, cognitive deficits and slow progress, decreased activity tolerance    • Skilled therapy is required to address these limitations in attempt to maximize the patient's independence.    Education Provided:   Learning Assessment:  - Primary learner: patient  - Are they ready to learn: yes  - Preferred learning style: verbal  - Barriers to learning: cognitive  Education provided during session:  - Receiving Education: patient  - Results of above outlined education: Needs reinforcement and Demonstrates understanding    Patient at End of Session:   Location: in bed  Safety measures: alarm system in place/re-engaged, call light within reach, lines intact and equipment intact  Handoff to: nurse    PLAN  Suggestions for next session as indicated: Dynamic sitting balance with ADLs; Item retriveal in unsupported seated position     Frequency Comments: MTHF 2 /3-5 by 10/3  AIDE  (TEP M-F re-assess 9/30)   Interventions: activity tolerance training, bed mobility training, ADL retraining, balance, patient education, safety training, transfer training, functional transfer training, continued evaluation and therapeutic activity  Agreement to plan and goals: patient agrees with goals and treatment plan      GOALS  Review Date: 10/3/2022  Long Term Goals: (to be met by time of discharge from hospital)  Grooming: Patient will complete grooming tasks at sink modified independent.  Status: progressing/ongoing  Upper body dressing: Patient will complete upper body dressing in sitting modified independent.  Status: progressing/ongoing  Lower body dressing: Patient will complete lower body dressing in sitting modified independent.  Status: progressing/ongoing  Toileting: Patient will complete toileting modified independent.  Status:  progressing/ongoing  Bathing: Patient will complete bathingmodified independent  Status: progressing/ongoingToilet transfer: Patient will complete toilet transfer with modified independent. Status: progressing/ongoing  Home setting transfer: Patient will complete home setting transfers with modified independent.  Status: progressing/ongoing        Documented in the chart in the following areas: Pain. Assessment. Plan.      Therapy procedure time and total treatment time can be found documented on the Time Entry flowsheet

## 2022-10-20 NOTE — OR NURSING
"Pt states "I am aggravated because I was told I would be getting a plate in my hand, but instead I got two screws which will need to be removed"   Also states, " I do not want this Norco for pain.  It is garbage"  Dr Jerez's office called.  Pt's cell number given to office staff for Dr Jerez to contact pt.    " No

## 2023-01-13 ENCOUNTER — HOSPITAL ENCOUNTER (EMERGENCY)
Facility: HOSPITAL | Age: 23
Discharge: HOME | End: 2023-01-14
Attending: EMERGENCY MEDICINE
Payer: COMMERCIAL

## 2023-01-13 DIAGNOSIS — R10.9 ACUTE RIGHT FLANK PAIN: Primary | ICD-10-CM

## 2023-01-13 DIAGNOSIS — R31.9 HEMATURIA, UNSPECIFIED TYPE: ICD-10-CM

## 2023-01-13 LAB
ALBUMIN SERPL BCP-MCNC: 4.1 G/DL (ref 3.5–5.2)
ALP SERPL-CCNC: 65 U/L (ref 55–135)
ALT SERPL W/O P-5'-P-CCNC: 26 U/L (ref 10–44)
ANION GAP SERPL CALC-SCNC: 13 MMOL/L (ref 8–16)
ANISOCYTOSIS BLD QL SMEAR: SLIGHT
AST SERPL-CCNC: 20 U/L (ref 10–40)
BACTERIA #/AREA URNS AUTO: ABNORMAL /HPF
BASOPHILS # BLD AUTO: 0.03 K/UL (ref 0–0.2)
BASOPHILS NFR BLD: 1.1 % (ref 0–1.9)
BILIRUB SERPL-MCNC: 0.7 MG/DL (ref 0.1–1)
BILIRUB UR QL STRIP: NEGATIVE
BUN SERPL-MCNC: 12 MG/DL (ref 6–20)
CALCIUM SERPL-MCNC: 9.6 MG/DL (ref 8.7–10.5)
CHLORIDE SERPL-SCNC: 103 MMOL/L (ref 95–110)
CLARITY UR REFRACT.AUTO: CLEAR
CO2 SERPL-SCNC: 23 MMOL/L (ref 23–29)
COLOR UR AUTO: YELLOW
CREAT SERPL-MCNC: 0.8 MG/DL (ref 0.5–1.4)
DACRYOCYTES BLD QL SMEAR: ABNORMAL
DIFFERENTIAL METHOD: ABNORMAL
EOSINOPHIL # BLD AUTO: 0.1 K/UL (ref 0–0.5)
EOSINOPHIL NFR BLD: 1.8 % (ref 0–8)
ERYTHROCYTE [DISTWIDTH] IN BLOOD BY AUTOMATED COUNT: 12.5 % (ref 11.5–14.5)
EST. GFR  (NO RACE VARIABLE): >60 ML/MIN/1.73 M^2
GLUCOSE SERPL-MCNC: 81 MG/DL (ref 70–110)
GLUCOSE UR QL STRIP: NEGATIVE
HCT VFR BLD AUTO: 46.4 % (ref 40–54)
HGB BLD-MCNC: 15.5 G/DL (ref 14–18)
HGB UR QL STRIP: ABNORMAL
IMM GRANULOCYTES # BLD AUTO: 0.01 K/UL (ref 0–0.04)
IMM GRANULOCYTES NFR BLD AUTO: 0.4 % (ref 0–0.5)
INFLUENZA A, MOLECULAR: NOT DETECTED
INFLUENZA B, MOLECULAR: NOT DETECTED
KETONES UR QL STRIP: NEGATIVE
LACTATE SERPL-SCNC: 1.1 MMOL/L (ref 0.5–2.2)
LEUKOCYTE ESTERASE UR QL STRIP: NEGATIVE
LYMPHOCYTES # BLD AUTO: 0.8 K/UL (ref 1–4.8)
LYMPHOCYTES NFR BLD: 28.1 % (ref 18–48)
MCH RBC QN AUTO: 29.6 PG (ref 27–31)
MCHC RBC AUTO-ENTMCNC: 33.4 G/DL (ref 32–36)
MCV RBC AUTO: 89 FL (ref 82–98)
MICROSCOPIC COMMENT: ABNORMAL
MONOCYTES # BLD AUTO: 0.3 K/UL (ref 0.3–1)
MONOCYTES NFR BLD: 9.8 % (ref 4–15)
NEUTROPHILS # BLD AUTO: 1.7 K/UL (ref 1.8–7.7)
NEUTROPHILS NFR BLD: 58.8 % (ref 38–73)
NITRITE UR QL STRIP: NEGATIVE
NRBC BLD-RTO: 0 /100 WBC
PH UR STRIP: 6 [PH] (ref 5–8)
PLATELET # BLD AUTO: 200 K/UL (ref 150–450)
PLATELET BLD QL SMEAR: ABNORMAL
PMV BLD AUTO: 9.3 FL (ref 9.2–12.9)
POIKILOCYTOSIS BLD QL SMEAR: SLIGHT
POTASSIUM SERPL-SCNC: 3.6 MMOL/L (ref 3.5–5.1)
PROCALCITONIN SERPL IA-MCNC: 0.11 NG/ML
PROT SERPL-MCNC: 8 G/DL (ref 6–8.4)
PROT UR QL STRIP: ABNORMAL
RBC # BLD AUTO: 5.24 M/UL (ref 4.6–6.2)
RBC #/AREA URNS AUTO: 7 /HPF (ref 0–4)
RSV AG BY MOLECULAR METHOD: NOT DETECTED
SARS-COV-2 RNA RESP QL NAA+PROBE: NOT DETECTED
SODIUM SERPL-SCNC: 139 MMOL/L (ref 136–145)
SP GR UR STRIP: 1.01 (ref 1–1.03)
URN SPEC COLLECT METH UR: ABNORMAL
WBC # BLD AUTO: 2.85 K/UL (ref 3.9–12.7)
WBC #/AREA URNS AUTO: 1 /HPF (ref 0–5)

## 2023-01-13 PROCEDURE — 84145 PROCALCITONIN (PCT): CPT | Performed by: PHYSICIAN ASSISTANT

## 2023-01-13 PROCEDURE — 25000003 PHARM REV CODE 250: Performed by: NURSE PRACTITIONER

## 2023-01-13 PROCEDURE — 99285 EMERGENCY DEPT VISIT HI MDM: CPT | Mod: 25

## 2023-01-13 PROCEDURE — 99284 EMERGENCY DEPT VISIT MOD MDM: CPT | Mod: CS,,, | Performed by: PHYSICIAN ASSISTANT

## 2023-01-13 PROCEDURE — 83605 ASSAY OF LACTIC ACID: CPT | Performed by: PHYSICIAN ASSISTANT

## 2023-01-13 PROCEDURE — 96375 TX/PRO/DX INJ NEW DRUG ADDON: CPT

## 2023-01-13 PROCEDURE — 63600175 PHARM REV CODE 636 W HCPCS: Performed by: NURSE PRACTITIONER

## 2023-01-13 PROCEDURE — 25000003 PHARM REV CODE 250: Performed by: PHYSICIAN ASSISTANT

## 2023-01-13 PROCEDURE — 96374 THER/PROPH/DIAG INJ IV PUSH: CPT | Mod: 59

## 2023-01-13 PROCEDURE — 85025 COMPLETE CBC W/AUTO DIFF WBC: CPT | Performed by: NURSE PRACTITIONER

## 2023-01-13 PROCEDURE — 0241U SARS-COV2 (COVID) WITH FLU/RSV BY PCR: CPT | Performed by: PHYSICIAN ASSISTANT

## 2023-01-13 PROCEDURE — 81001 URINALYSIS AUTO W/SCOPE: CPT | Performed by: NURSE PRACTITIONER

## 2023-01-13 PROCEDURE — 80053 COMPREHEN METABOLIC PANEL: CPT | Performed by: NURSE PRACTITIONER

## 2023-01-13 PROCEDURE — 99284 PR EMERGENCY DEPT VISIT,LEVEL IV: ICD-10-PCS | Mod: CS,,, | Performed by: PHYSICIAN ASSISTANT

## 2023-01-13 PROCEDURE — 63600175 PHARM REV CODE 636 W HCPCS: Performed by: PHYSICIAN ASSISTANT

## 2023-01-13 PROCEDURE — 25500020 PHARM REV CODE 255: Performed by: EMERGENCY MEDICINE

## 2023-01-13 PROCEDURE — 96361 HYDRATE IV INFUSION ADD-ON: CPT

## 2023-01-13 RX ORDER — HYDROCODONE BITARTRATE AND ACETAMINOPHEN 5; 325 MG/1; MG/1
1 TABLET ORAL
Status: COMPLETED | OUTPATIENT
Start: 2023-01-13 | End: 2023-01-13

## 2023-01-13 RX ORDER — DEXTROAMPHETAMINE SACCHARATE, AMPHETAMINE ASPARTATE, DEXTROAMPHETAMINE SULFATE AND AMPHETAMINE SULFATE 5; 5; 5; 5 MG/1; MG/1; MG/1; MG/1
1 TABLET ORAL 2 TIMES DAILY
COMMUNITY
Start: 2022-11-09

## 2023-01-13 RX ORDER — ONDANSETRON 2 MG/ML
4 INJECTION INTRAMUSCULAR; INTRAVENOUS
Status: COMPLETED | OUTPATIENT
Start: 2023-01-13 | End: 2023-01-13

## 2023-01-13 RX ORDER — EPINEPHRINE 0.3 MG/.3ML
0.3 INJECTION SUBCUTANEOUS
COMMUNITY
Start: 2023-01-01

## 2023-01-13 RX ORDER — ONDANSETRON 8 MG/1
8 TABLET, ORALLY DISINTEGRATING ORAL EVERY 8 HOURS PRN
COMMUNITY
Start: 2022-08-10

## 2023-01-13 RX ORDER — KETOROLAC TROMETHAMINE 30 MG/ML
15 INJECTION, SOLUTION INTRAMUSCULAR; INTRAVENOUS
Status: COMPLETED | OUTPATIENT
Start: 2023-01-13 | End: 2023-01-13

## 2023-01-13 RX ORDER — PREDNISONE 10 MG/1
20 TABLET ORAL 3 TIMES DAILY
COMMUNITY
Start: 2022-10-17

## 2023-01-13 RX ADMIN — SODIUM CHLORIDE 1000 ML: 9 INJECTION, SOLUTION INTRAVENOUS at 09:01

## 2023-01-13 RX ADMIN — IOHEXOL 75 ML: 350 INJECTION, SOLUTION INTRAVENOUS at 09:01

## 2023-01-13 RX ADMIN — KETOROLAC TROMETHAMINE 15 MG: 30 INJECTION, SOLUTION INTRAMUSCULAR; INTRAVENOUS at 07:01

## 2023-01-13 RX ADMIN — ONDANSETRON 4 MG: 2 INJECTION INTRAMUSCULAR; INTRAVENOUS at 09:01

## 2023-01-13 RX ADMIN — HYDROCODONE BITARTRATE AND ACETAMINOPHEN 1 TABLET: 5; 325 TABLET ORAL at 09:01

## 2023-01-14 VITALS
HEART RATE: 92 BPM | TEMPERATURE: 99 F | BODY MASS INDEX: 24.37 KG/M2 | RESPIRATION RATE: 16 BRPM | SYSTOLIC BLOOD PRESSURE: 130 MMHG | DIASTOLIC BLOOD PRESSURE: 62 MMHG | WEIGHT: 165 LBS | OXYGEN SATURATION: 100 %

## 2023-01-14 NOTE — ED NOTES
"Fernie Rice, a 22 y.o. male presents to the ED w/ complaint of generalized back pain x2 days, states "pain is mostly in my kidneys." Reports 5-6 kidney surgeries in 2022. Reports vomiting 2 days ago. Denies painful/burning urination    Triage note:  Chief Complaint   Patient presents with    Back Pain     Right sided back pain; hx kidney issues     Review of patient's allergies indicates:   Allergen Reactions    Fish containing products Other (See Comments)    Peanut Anaphylaxis    Amoxicillin Hives     Tolerates ceftriaxone and cefepime    Bactrim [sulfamethoxazole-trimethoprim] Hives    Corn containing products Other (See Comments)    Soy fiber     Soy flour Dermatitis     Past Medical History:   Diagnosis Date    ADHD (attention deficit hyperactivity disorder)     Asthma     Eczema     Esophagitis     Food impaction of esophagus     Multiple food allergies     Vernal keratoconjunctivitis     Patient identifiers for Fernie Rice checked and correct.    LOC: The patient is awake, alert and aware of environment with an appropriate affect, the patient is oriented x 4 and speaking appropriately.  APPEARANCE: Patient resting comfortably and in no acute distress, patient is clean and well groomed, patient's clothing is properly fastened.  SKIN: The skin is warm and dry, color consistent with ethnicity, patient has normal skin turgor and moist mucus membranes, skin intact, no breakdown or bruising noted.  MUSCULOSKELETAL: Patient moving all extremities well, no obvious swelling or deformities noted. +generalized back pain  RESPIRATORY: Airway is open and patent, respirations are spontaneous and even, patient has a normal effort and rate.  CARDIAC: Patient has a normal rate and rhythm, no periphreal edema noted, capillary refill < 3 seconds. Normal +2 pedal pulses present.  ABDOMEN: Soft and non tender to palpation, no distention noted. Patient denies any nausea, vomiting, diarrhea, or constipation.   NEUROLOGIC: " Eyes open spontaneously, PERRL, behavior appropriate to situation, follows commands, facial expression symmetrical, bilateral hand grasp equal and even, purposeful motor response noted, normal sensation in all extremities.

## 2023-01-14 NOTE — FIRST PROVIDER EVALUATION
" Emergency Department TeleTriage Encounter Note      CHIEF COMPLAINT    Chief Complaint   Patient presents with    Back Pain     Right sided back pain; hx kidney issues       VITAL SIGNS   Initial Vitals [01/13/23 1827]   BP Pulse Resp Temp SpO2   132/87 90 16 98.3 °F (36.8 °C) 100 %      MAP       --            ALLERGIES    Review of patient's allergies indicates:   Allergen Reactions    Fish containing products Other (See Comments)    Peanut Anaphylaxis    Amoxicillin Hives     Tolerates ceftriaxone and cefepime    Bactrim [sulfamethoxazole-trimethoprim] Hives    Corn containing products Other (See Comments)    Soy fiber     Soy flour Dermatitis       PROVIDER TRIAGE NOTE  This is a teletriage evaluation of a 22 y.o. male presenting to the ED complaining of right sided back/flank pain since Wednesday. States that he was referred from  due to UA with large amount of blood and protein. Reports pmhx of ureteral surgery and states pain feels similar to when he had problems in the past. Denies vomiting today but did vomit on Wednesday.     Will start labs, IV fluids, toradol. Pt states that his renal function is "normal."      Initial orders will be placed and care will be transferred to an alternate provider when patient is roomed for a full evaluation. Any additional orders and the final disposition will be determined by that provider.         ORDERS  Labs Reviewed   CBC W/ AUTO DIFFERENTIAL   COMPREHENSIVE METABOLIC PANEL   URINALYSIS, REFLEX TO URINE CULTURE       ED Orders (720h ago, onward)      Start Ordered     Status Ordering Provider    01/13/23 1945 01/13/23 1938  sodium chloride 0.9% bolus 1,000 mL 1,000 mL  ED 1 Time         Ordered FRANCY KENT N.    01/13/23 1945 01/13/23 1938  ketorolac injection 15 mg  ED 1 Time         Ordered FRANCY KENT N.    01/13/23 1938 01/13/23 1938  Saline lock IV  Once         Ordered FRANCY KENT N.    01/13/23 1938 01/13/23 1938  CBC auto " differential  STAT         Ordered YOLI FRANCY N.    01/13/23 1938 01/13/23 1938  Comprehensive metabolic panel  STAT         Ordered ROCHELLEBLAYNE FRANCY N.    01/13/23 1938 01/13/23 1938  Urinalysis, Reflex to Urine Culture Urine, Clean Catch  STAT         Ordered FRANCY KENT N.              Virtual Visit Note: The provider triage portion of this emergency department evaluation and documentation was performed via [employer], a HIPAA-compliant telemedicine application, in concert with a tele-presenter in the room. A face to face patient evaluation with one of my colleagues will occur once the patient is placed in an emergency department room.      DISCLAIMER: This note was prepared with [employer] voice recognition transcription software. Garbled syntax, mangled pronouns, and other bizarre constructions may be attributed to that software system.

## 2023-01-14 NOTE — ED PROVIDER NOTES
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  Pain is constant, aching and dull.  He can not identify any specific palliating or provoking factors.  Reports associated headache, chills and elevated temp last night at 99.9° F.  He denies dysuria, hematuria, vomiting or other complaints.  He is concerned because he had similar symptoms when he had severe pyelonephritis in 2020.    Review of patient's allergies indicates:   Allergen Reactions    Amoxicillin Hives and Rash     Tolerates ceftriaxone and cefepime  Tolerates ceftriaxone and cefepime    Fish containing products Other (See Comments) and Anaphylaxis    Peanut Anaphylaxis    Sulfamethoxazole-trimethoprim Hives and Rash    Corn containing products Other (See Comments)    Soy fiber     Soy flour Dermatitis     Past Medical History:   Diagnosis Date    ADHD (attention deficit hyperactivity disorder)     Asthma     Eczema     Esophagitis     Food impaction of esophagus     Multiple food allergies     Vernal keratoconjunctivitis      Past Surgical History:   Procedure Laterality Date    ADENOIDECTOMY      ANTEGRADE NEPHROSTOGRAPHY Right 2/14/2020    Procedure: NEPHROSTOGRAM, ANTEGRADE;  Surgeon: Chace Prado MD;  Location: [facility] OR [address] [place] [address];  Service: Urology;  Laterality: Right;    CLOSED REDUCTION OF INJURY OF HAND WITH PERCUTANEOUS PINNING Right 1/2/2020    Procedure: CLOSED REDUCTION, HAND, WITH PERCUTANEOUS PINNING;  Surgeon: Chace Jerez MD;  Location: [facility];  Service: Orthopedics;  Laterality: Right;    CYSTOGRAM N/A 2/14/2020    Procedure: CYSTOGRAM;  Surgeon: Chace Prado MD;  Location: [address];  Service: Urology;  Laterality: N/A;    CYSTOSCOPY N/A 2/14/2020    Procedure:  CYSTOSCOPY;  Surgeon: Chace Prado MD;  [facility];  Service: Urology;  Laterality: N/A;  75 min    CYSTOSCOPY W/ URETERAL STENT REMOVAL Right 5/7/2020    Procedure: CYSTOSCOPY, WITH URETERAL STENT REMOVAL;  Surgeon: Chace Prado MD;  [facility];  Service: Urology;  Laterality: Right;    EYE SURGERY      ulcers removed    REPLACEMENT OF NEPHROSTOMY TUBE Right 2/14/2020    Procedure: REMOVAL, NEPHROSTOMY TUBE;  Surgeon: Chace Prado MD;  [facility];  Service: Urology;  Laterality: Right;    RETROGRADE PYELOGRAPHY Right 2/14/2020    Procedure: PYELOGRAM, RETROGRADE;  Surgeon: Chace Prado MD;  [facility];  Service: Urology;  Laterality: Right;    ROBOT-ASSISTED LAPAROSCOPIC REIMPLANTATION OF URETER USING DA URI XI Right 3/2/2020    Procedure: XI ROBOTIC REIMPLANTATION, URETER;  Surgeon: Chace Prado MD;  [facility];  Service: Urology;  Laterality: Right;  3hrs    SINUS SURGERY       History reviewed. No pertinent family history.  Social History     Tobacco Use    Smoking status: Some Days    Smokeless tobacco: Never   Substance Use Topics    Alcohol use: Yes     Alcohol/week: 20.0 standard drinks     Types: 20 Cans of beer per week    Drug use: Yes     Types: Marijuana     Review of Systems   Constitutional:  Positive for chills. Negative for fever.   Respiratory:  Negative for shortness of breath.    Cardiovascular:  Negative for chest pain.   Gastrointestinal:  Negative for abdominal pain, diarrhea, nausea and vomiting.   Genitourinary:  Positive for flank pain. Negative for dysuria and hematuria.   Neurological:  Positive for headaches.     Physical Exam     Initial Vitals [01/13/23 1827]   BP Pulse Resp Temp SpO2   132/87 90 16 98.3 °F (36.8 °C) 100 %      MAP       --         Physical Exam    Nursing note and vitals reviewed.  Constitutional: He appears well-developed and well-nourished. He is not  diaphoretic. No distress.   HENT:   Head: Normocephalic and atraumatic.   Eyes: EOM are normal. Pupils are equal, round, and reactive to light.   Neck: Neck supple.   Normal range of motion.  Cardiovascular:  Normal rate, regular rhythm, normal heart sounds and intact distal pulses.     Exam reveals no gallop and no friction rub.       No murmur heard.  Pulmonary/Chest: Breath sounds normal. No respiratory distress. He has no wheezes. He has no rhonchi. He has no rales. He exhibits no tenderness.   Abdominal: Abdomen is soft. Bowel sounds are normal. He exhibits no distension and no mass. There is abdominal tenderness.   There is right CVA tenderness.  No left CVA tenderness. There is no rebound and no guarding.   Musculoskeletal:         General: Normal range of motion.      Cervical back: Normal range of motion and neck supple.     Neurological: He is alert and oriented to person, place, and time.   Skin: Skin is warm and dry.   Psychiatric: He has a normal mood and affect.       ED Course   Procedures  Labs Reviewed   CBC W/ AUTO DIFFERENTIAL - Abnormal; Notable for the following components:       Result Value    WBC 2.85 (*)     Gran # (ANC) 1.7 (*)     Lymph # 0.8 (*)     All other components within normal limits   URINALYSIS, REFLEX TO URINE CULTURE - Abnormal; Notable for the following components:    Protein, UA Trace (*)     Occult Blood UA 1+ (*)     All other components within normal limits    Narrative:     Specimen Source->Urine   URINALYSIS MICROSCOPIC - Abnormal; Notable for the following components:    RBC, UA 7 (*)     All other components within normal limits    Narrative:     Specimen Source->Urine   COMPREHENSIVE METABOLIC PANEL   LACTIC ACID, PLASMA   PROCALCITONIN   SARS-COV2 (COVID) WITH FLU/RSV BY PCR          Imaging Results               CT Abdomen Pelvis With Contrast (Final result)  Result time 01/13/23 23:18:43      Final result by Herrera Santa MD (01/13/23 23:18:43)                    Impression:      Right kidney cortical atrophy at the upper pole with hypoattenuation at this location which may be due to chronic changes related to previous chronic obstruction of upper pole moiety of this duplicated right collecting system.  Multiple nonobstructing calcifications are seen which appears similar compared to 02/02/2020 exam.    Mild dilatation of the upper pole ureter of the duplicated right collecting system which appears improved compared to prior. Persistent ureteral wall thickening which appears chronic.    No obstructing stones or hydronephrosis.    Numerous mildly enlarged mid mesenteric lymph nodes, nonspecific.  Possibly reactive.  Correlate clinically for mesenteric adenitis.    Electronically signed by resident: Maye Mustafa  Date:    01/13/2023  Time:    21:51    Electronically signed by: Herrera Santa MD  Date:    01/13/2023  Time:    23:18               Narrative:    EXAMINATION:  CT ABDOMEN PELVIS WITH CONTRAST    CLINICAL HISTORY:  Flank pain, kidney stone suspected;Severe flank pain, history of ureteral obstruction, concern for complicated pyelonephritis;    TECHNIQUE:  Low dose axial images, sagittal and coronal reformations were obtained from the lung bases to the pubic symphysis following the IV administration of 75 mL of Omnipaque 350.  Oral contrast was not administered.    COMPARISON:  Abdominal radiograph 03/03/2020, CT abdomen pelvis 02/20/2020    FINDINGS:  LUNG BASES: Unremarkable.    HEPATOBILIARY: No focal hepatic lesions. No biliary ductal dilatation. Normal gallbladder.    SPLEEN: Enlarged measuring 14 cm, similar to prior    PANCREAS: No focal masses or ductal dilatation.    ADRENALS: No adrenal nodules.    KIDNEYS/URETERS: Previous right nephroureteral stent has been removed.  Right kidney cortical atrophy at the upper pole.  Hypoattenuation at this region could be related to chronic changes.  No significant perinephric fat stranding.  No obstructing renal  stones.  No hydronephrosis.  Small right renal cysts at the upper pole.  Mild dilatation of the upper pole ureter of the duplicated right collecting system which appears improved compared to prior.  Persistent ureteral wall thickening which appears chronic.    PELVIC ORGANS/BLADDER: Unremarkable.    PERITONEUM / RETROPERITONEUM: No free air or fluid.    LYMPH NODES: Numerous mildly enlarged mid mesenteric lymph nodes measuring approximately 1 cm.    VESSELS: Unremarkable.    GI TRACT: No distention or wall thickening.  Normal appendix.    BONES AND SOFT TISSUES: No fractures or focal osseous lesions.    This report was flagged in Epic as abnormal.                                       Medications   sodium chloride 0.9% bolus 1,000 mL 1,000 mL (0 mLs Intravenous Stopped 1/13/23 2157)   ketorolac injection 15 mg (15 mg Intravenous Given 1/13/23 1956)   ondansetron injection 4 mg (4 mg Intravenous Given 1/13/23 2103)   HYDROcodone-acetaminophen 5-325 mg per tablet 1 tablet (1 tablet Oral Given 1/13/23 2103)   iohexoL (OMNIPAQUE 350) injection 75 mL (75 mLs Intravenous Given 1/13/23 2127)     Medical Decision Making:   History:   Old Medical Records: I decided to obtain old medical records.  Old Records Summarized: records from previous admission(s).       <> Summary of Records: Patient discharged 02/07/2020 after 5 day admission for pyelonephritis.  Initial Assessment:   22-year-old male presenting for right flank pain.  His vitals are normal, he appears uncomfortable but nontoxic.  Differential Diagnosis:   Pyelonephritis   Renal abscess   TIERA   Ureterolithiasis  Does not appear obviously septic  Clinical Tests:   Lab Tests: Ordered and Reviewed  Radiological Study: Ordered  ED Management:  Will check labs, give fluids, analgesics antiemetics, do CT abdomen pelvis and reassess.    Patient reports improvement of symptoms after therapy.  Labs are notable for leukopenia but otherwise unremarkable.  UA and CT abdomen  pelvis are pending.  Dispo pending results of these.  I am signing out to Deysi Su PA-C.    10:53 PM  Theresa Bell PA-C             ED Course as of 01/14/23 1524   Fri Jan 13, 2023 2034 Creatinine: 0.8 [CC]   2034 WBC(!): 2.85  Leukopenia [CC]   2142 SARS-CoV2 (COVID-19) Qualitative PCR: Not Detected [CC]   2142 Influenza A, Molecular: Not Detected [CC]   2142 Influenza B, Molecular: Not Detected [CC]   2202 Lactate, Rigo: 1.1 [CC]   2215 Procalcitonin: 0.11 [CC]   2238 Patient reports feeling much better after therapy. [CC]   2322 CT Abdomen Pelvis With Contrast(!)  CT shows cortical atrophy as well as mild dilation of upper collecting system, similar compared to prior. [CC]      ED Course User Index  [CC] Theresa Bell PA-C                 Clinical Impression:   Final diagnoses:  [R10.9] Acute right flank pain (Primary)  [R31.9] Hematuria, unspecified type        ED Disposition Condition    Discharge Stable          ED Prescriptions    None       Follow-up Information       Follow up With Specialties Details Why Contact Info Additional Information    [facility] - Emergency Dept Emergency Medicine Go to  If symptoms worsen [address] [facility] [id number] [phone]     Fernie Garcia III, MD Pediatrics Schedule an appointment as soon as possible for a visit in 1 week  [address]  [place] [address]  [phone]       [facility] - Urology [facility] [address] Urology Schedule an appointment as soon as possible for a visit in 1 week  [address] [facility] [id number] [phone] Main Building, 4th Floor Please park in [facility] and take [facility] elevator             Theresa Bell PA-C  01/13/23 2253       Theresa Bell PA-C  01/14/23 1524

## 2023-01-14 NOTE — PROVIDER PROGRESS NOTES - EMERGENCY DEPT.
Encounter Date: 1/13/2023    ED Physician Progress Notes          ED Physician Hand-off Note:    ED Course: I assumed care of patient from off-going ED physician team. Briefly, Patient is a 23 yo M with PMHx of constant right sided flank pain x 1 day. No urinary symptoms.      At the time of signout plan was pending UA, CT abdomen pelvis.    Medications given in the ED:    Medications   sodium chloride 0.9% bolus 1,000 mL 1,000 mL (0 mLs Intravenous Stopped 1/13/23 2157)   ketorolac injection 15 mg (15 mg Intravenous Given 1/13/23 1956)   ondansetron injection 4 mg (4 mg Intravenous Given 1/13/23 2103)   HYDROcodone-acetaminophen 5-325 mg per tablet 1 tablet (1 tablet Oral Given 1/13/23 2103)   iohexoL (OMNIPAQUE 350) injection 75 mL (75 mLs Intravenous Given 1/13/23 2127)       Imaging Results               CT Abdomen Pelvis With Contrast (Final result)  Result time 01/13/23 23:18:43      Final result by Herrera Santa MD (01/13/23 23:18:43)                   Impression:      Right kidney cortical atrophy at the upper pole with hypoattenuation at this location which may be due to chronic changes related to previous chronic obstruction of upper pole moiety of this duplicated right collecting system.  Multiple nonobstructing calcifications are seen which appears similar compared to 02/02/2020 exam.    Mild dilatation of the upper pole ureter of the duplicated right collecting system which appears improved compared to prior. Persistent ureteral wall thickening which appears chronic.    No obstructing stones or hydronephrosis.    Numerous mildly enlarged mid mesenteric lymph nodes, nonspecific.  Possibly reactive.  Correlate clinically for mesenteric adenitis.    Electronically signed by resident: Maye Mustafa  Date:    01/13/2023  Time:    21:51    Electronically signed by: Herrera Santa MD  Date:    01/13/2023  Time:    23:18               Narrative:    EXAMINATION:  CT ABDOMEN PELVIS WITH  CONTRAST    CLINICAL HISTORY:  Flank pain, kidney stone suspected;Severe flank pain, history of ureteral obstruction, concern for complicated pyelonephritis;    TECHNIQUE:  Low dose axial images, sagittal and coronal reformations were obtained from the lung bases to the pubic symphysis following the IV administration of 75 mL of Omnipaque 350.  Oral contrast was not administered.    COMPARISON:  Abdominal radiograph 03/03/2020, CT abdomen pelvis 02/20/2020    FINDINGS:  LUNG BASES: Unremarkable.    HEPATOBILIARY: No focal hepatic lesions. No biliary ductal dilatation. Normal gallbladder.    SPLEEN: Enlarged measuring 14 cm, similar to prior    PANCREAS: No focal masses or ductal dilatation.    ADRENALS: No adrenal nodules.    KIDNEYS/URETERS: Previous right nephroureteral stent has been removed.  Right kidney cortical atrophy at the upper pole.  Hypoattenuation at this region could be related to chronic changes.  No significant perinephric fat stranding.  No obstructing renal stones.  No hydronephrosis.  Small right renal cysts at the upper pole.  Mild dilatation of the upper pole ureter of the duplicated right collecting system which appears improved compared to prior.  Persistent ureteral wall thickening which appears chronic.    PELVIC ORGANS/BLADDER: Unremarkable.    PERITONEUM / RETROPERITONEUM: No free air or fluid.    LYMPH NODES: Numerous mildly enlarged mid mesenteric lymph nodes measuring approximately 1 cm.    VESSELS: Unremarkable.    GI TRACT: No distention or wall thickening.  Normal appendix.    BONES AND SOFT TISSUES: No fractures or focal osseous lesions.    This report was flagged in Epic as abnormal.                                      Disposition: discharge    CT findings as above. Although abnormal, the appearance of the kidney appears stable. UA with hematuria, no infection.   Enlarged lymph nodes noted.   Recommend outpatient pcp and urology follow up.   Return precautions. All questions  answered.     The patient was instructed to follow up with a primary care provider and Urology or to return to the emergency department for worsening symptoms. The treatment plan was discussed with the patient who demonstrated understanding and comfort with plan.     Impression:     Final diagnoses:  [R10.9] Acute right flank pain (Primary)  [R31.9] Hematuria, unspecified type

## 2023-01-14 NOTE — DISCHARGE INSTRUCTIONS
Your kidney appears to be stable from your prior CT scans.   Your urine is not infected, however you do have blood in your urine.  Follow-up closely with your primary doctor and urology or return to the emergency department sooner for any new or worsening symptoms.

## 2023-01-20 ENCOUNTER — OFFICE VISIT (OUTPATIENT)
Dept: UROLOGY | Facility: CLINIC | Age: 23
End: 2023-01-20
Payer: COMMERCIAL

## 2023-01-20 VITALS
HEART RATE: 101 BPM | DIASTOLIC BLOOD PRESSURE: 80 MMHG | HEIGHT: 69 IN | BODY MASS INDEX: 24.03 KG/M2 | WEIGHT: 162.25 LBS | SYSTOLIC BLOOD PRESSURE: 123 MMHG

## 2023-01-20 DIAGNOSIS — N13.5 OBSTRUCTION OF RIGHT URETER: Primary | ICD-10-CM

## 2023-01-20 PROCEDURE — 99214 PR OFFICE/OUTPT VISIT, EST, LEVL IV, 30-39 MIN: ICD-10-PCS | Mod: S$GLB,,, | Performed by: UROLOGY

## 2023-01-20 PROCEDURE — 3008F PR BODY MASS INDEX (BMI) DOCUMENTED: ICD-10-PCS | Mod: CPTII,S$GLB,, | Performed by: UROLOGY

## 2023-01-20 PROCEDURE — 1159F MED LIST DOCD IN RCRD: CPT | Mod: CPTII,S$GLB,, | Performed by: UROLOGY

## 2023-01-20 PROCEDURE — 3074F SYST BP LT 130 MM HG: CPT | Mod: CPTII,S$GLB,, | Performed by: UROLOGY

## 2023-01-20 PROCEDURE — 3079F DIAST BP 80-89 MM HG: CPT | Mod: CPTII,S$GLB,, | Performed by: UROLOGY

## 2023-01-20 PROCEDURE — 99214 OFFICE O/P EST MOD 30 MIN: CPT | Mod: S$GLB,,, | Performed by: UROLOGY

## 2023-01-20 PROCEDURE — 3008F BODY MASS INDEX DOCD: CPT | Mod: CPTII,S$GLB,, | Performed by: UROLOGY

## 2023-01-20 PROCEDURE — 3079F PR MOST RECENT DIASTOLIC BLOOD PRESSURE 80-89 MM HG: ICD-10-PCS | Mod: CPTII,S$GLB,, | Performed by: UROLOGY

## 2023-01-20 PROCEDURE — 1159F PR MEDICATION LIST DOCUMENTED IN MEDICAL RECORD: ICD-10-PCS | Mod: CPTII,S$GLB,, | Performed by: UROLOGY

## 2023-01-20 PROCEDURE — 3074F PR MOST RECENT SYSTOLIC BLOOD PRESSURE < 130 MM HG: ICD-10-PCS | Mod: CPTII,S$GLB,, | Performed by: UROLOGY

## 2023-01-20 PROCEDURE — 99999 PR PBB SHADOW E&M-EST. PATIENT-LVL III: CPT | Mod: PBBFAC,,, | Performed by: UROLOGY

## 2023-01-20 PROCEDURE — 99999 PR PBB SHADOW E&M-EST. PATIENT-LVL III: ICD-10-PCS | Mod: PBBFAC,,, | Performed by: UROLOGY

## 2023-01-23 ENCOUNTER — TELEPHONE (OUTPATIENT)
Dept: UROLOGY | Facility: CLINIC | Age: 23
End: 2023-01-23
Payer: COMMERCIAL

## 2023-01-23 DIAGNOSIS — N20.1 RIGHT URETERAL STONE: Primary | ICD-10-CM

## 2023-02-02 ENCOUNTER — ANESTHESIA EVENT (OUTPATIENT)
Dept: SURGERY | Facility: HOSPITAL | Age: 23
End: 2023-02-02
Payer: COMMERCIAL

## 2023-02-02 NOTE — ANESTHESIA PREPROCEDURE EVALUATION
02/02/2023  Fernie Rice is a 22 y.o., male.      Pre-op Assessment    I have reviewed the Patient Summary Reports.     I have reviewed the Nursing Notes. I have reviewed the NPO Status.   I have reviewed the Medications.     Review of Systems  Anesthesia Hx:  No problems with previous Anesthesia  History of prior surgery of interest to airway management or planning: Previous anesthesia: General Denies Family Hx of Anesthesia complications.   Denies Personal Hx of Anesthesia complications.   Cardiovascular:   Dysrhythmias: denies palpitations,incomplete RBBB   Palpitations  Incomplete RR Disorder Of Cardiac Rhythm: BBB.    Pulmonary:   Asthma (sever asthma as a child with multiple hospitalizations ,now improving ) mild Sinus allergy  H/o adenoidectomy  snoring   Renal/:  Other Renal / Gu Conditions: (calculus)   Hepatic/GI:  Esophageal Disorder / Stomach Disorders: stricture?    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      There is no height or weight on file to calculate  BMI.    Tobacco Use: High Risk    Smoking Tobacco Use: Some Days    Smokeless Tobacco Use: Never    Passive Exposure: Not on file       Social History     Substance and Sexual Activity   Drug Use Yes    Types: Marijuana        Alcohol Use: Not on file       Review of patient's allergies indicates:   Allergen Reactions    Amoxicillin Hives and Rash     Tolerates ceftriaxone and cefepime  Tolerates ceftriaxone and cefepime    Fish containing products Other (See Comments) and Anaphylaxis    Peanut Anaphylaxis    Sulfamethoxazole-trimethoprim Hives and Rash    Corn containing products Other (See Comments)    Soy fiber     Soy flour Dermatitis         Airway:  No value filed.     Physical Exam    Airway:  Mallampati: II   Mouth Opening: Normal  TM Distance: Normal  Tongue: Normal  Neck ROM: Normal ROM    Dental:  Intact  Beard      Anesthesia Plan  Type of Anesthesia, risks & benefits discussed:    Anesthesia Type: Gen ETT, Gen Supraglottic Airway  Intra-op Monitoring Plan: Standard ASA Monitors  Post Op Pain Control Plan: multimodal analgesia  Induction:  IV  Airway Plan: Video  Informed Consent: Informed consent signed with the Patient and all parties understand the risks and agree with anesthesia plan.  All questions answered.   ASA Score: 2  Day of Surgery Review of History & Physical: H&P Update referred to the surgeon/provider.    Ready For Surgery From Anesthesia Perspective.     .

## 2023-02-06 NOTE — H&P (VIEW-ONLY)
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  03/02/2020 Procedure(s) Performed with Dr. Prado.:   1.  robotic assisted laparoscopic ureteral reimplant     Findings:    -- upper pole ureter anterior to lower pole ureter at iliacs, lower pole ureter crossed anterior to upper pole ureter just distal to vas deferens  - upper pole ureter confirmed by irrigating through upper pole nephrostomy tube  - tunneled extravesical reimplant performed  - ureteroneocystotomy water tight     No presents with incidental finding on CT:     Impression:     Right kidney cortical atrophy at the upper pole with hypoattenuation at this location which may be due to chronic changes related to previous chronic obstruction of upper pole moiety of this duplicated right collecting system.  Multiple nonobstructing calcifications are seen which appears similar compared to 02/02/2020 exam.     Mild dilatation of the upper pole ureter of the duplicated right collecting system which appears improved compared to prior. Persistent ureteral wall thickening which appears chronic.     No obstructing stones or hydronephrosis.      Assessment/Plan: Ureteral wall thickening  Will plan on cystoscopy with retrograde pyelography

## 2023-02-07 NOTE — PRE-PROCEDURE INSTRUCTIONS
Patient notified to arrive 2 hours before scheduled procedure. Given address and instructed to come to 2nd floor registration. Told patient we would need a urine specimen.

## 2023-02-08 ENCOUNTER — TELEPHONE (OUTPATIENT)
Dept: UROLOGY | Facility: CLINIC | Age: 23
End: 2023-02-08
Payer: COMMERCIAL

## 2023-02-08 ENCOUNTER — HOSPITAL ENCOUNTER (OUTPATIENT)
Facility: HOSPITAL | Age: 23
Discharge: HOME | End: 2023-02-08
Attending: UROLOGY | Admitting: UROLOGY
Payer: COMMERCIAL

## 2023-02-08 ENCOUNTER — HOSPITAL ENCOUNTER (OUTPATIENT)
Dept: RADIOLOGY | Facility: HOSPITAL | Age: 23
Discharge: HOME | End: 2023-02-08
Attending: UROLOGY | Admitting: UROLOGY
Payer: COMMERCIAL

## 2023-02-08 ENCOUNTER — ANESTHESIA (OUTPATIENT)
Dept: SURGERY | Facility: HOSPITAL | Age: 23
End: 2023-02-08
Payer: COMMERCIAL

## 2023-02-08 VITALS
DIASTOLIC BLOOD PRESSURE: 86 MMHG | HEART RATE: 76 BPM | OXYGEN SATURATION: 96 % | SYSTOLIC BLOOD PRESSURE: 123 MMHG | TEMPERATURE: 98 F | RESPIRATION RATE: 20 BRPM

## 2023-02-08 DIAGNOSIS — N20.9 CALCULUS (=STONE): ICD-10-CM

## 2023-02-08 DIAGNOSIS — N13.8: ICD-10-CM

## 2023-02-08 DIAGNOSIS — N20.9 UROLITHIASIS: ICD-10-CM

## 2023-02-08 DIAGNOSIS — Q62.5: ICD-10-CM

## 2023-02-08 DIAGNOSIS — Z98.890 S/P URETERAL REIMPLANTATION: ICD-10-CM

## 2023-02-08 DIAGNOSIS — N20.0 RIGHT KIDNEY STONE: Primary | ICD-10-CM

## 2023-02-08 PROCEDURE — D9220A PRA ANESTHESIA: Mod: ANES,,, | Performed by: ANESTHESIOLOGY

## 2023-02-08 PROCEDURE — 37000009 HC ANESTHESIA EA ADD 15 MINS: Performed by: UROLOGY

## 2023-02-08 PROCEDURE — 63600175 PHARM REV CODE 636 W HCPCS: Performed by: UROLOGY

## 2023-02-08 PROCEDURE — 63600175 PHARM REV CODE 636 W HCPCS: Performed by: NURSE ANESTHETIST, CERTIFIED REGISTERED

## 2023-02-08 PROCEDURE — D9220A PRA ANESTHESIA: ICD-10-PCS | Mod: CRNA,,, | Performed by: NURSE ANESTHETIST, CERTIFIED REGISTERED

## 2023-02-08 PROCEDURE — 76000 FLUOROSCOPY <1 HR PHYS/QHP: CPT | Mod: TC

## 2023-02-08 PROCEDURE — 25500020 PHARM REV CODE 255: Performed by: UROLOGY

## 2023-02-08 PROCEDURE — 37000008 HC ANESTHESIA 1ST 15 MINUTES: Performed by: UROLOGY

## 2023-02-08 PROCEDURE — 25000003 PHARM REV CODE 250: Performed by: NURSE ANESTHETIST, CERTIFIED REGISTERED

## 2023-02-08 PROCEDURE — 52351 PR CYSTO/URETERO/PYELOSCOPY, DX: ICD-10-PCS | Mod: ,,, | Performed by: UROLOGY

## 2023-02-08 PROCEDURE — 36000706: Performed by: UROLOGY

## 2023-02-08 PROCEDURE — 94761 N-INVAS EAR/PLS OXIMETRY MLT: CPT

## 2023-02-08 PROCEDURE — D9220A PRA ANESTHESIA: ICD-10-PCS | Mod: ANES,,, | Performed by: ANESTHESIOLOGY

## 2023-02-08 PROCEDURE — D9220A PRA ANESTHESIA: Mod: CRNA,,, | Performed by: NURSE ANESTHETIST, CERTIFIED REGISTERED

## 2023-02-08 PROCEDURE — 25000003 PHARM REV CODE 250: Performed by: STUDENT IN AN ORGANIZED HEALTH CARE EDUCATION/TRAINING PROGRAM

## 2023-02-08 PROCEDURE — 99900035 HC TECH TIME PER 15 MIN (STAT)

## 2023-02-08 PROCEDURE — 71000015 HC POSTOP RECOV 1ST HR: Performed by: UROLOGY

## 2023-02-08 PROCEDURE — 71000033 HC RECOVERY, INTIAL HOUR: Performed by: UROLOGY

## 2023-02-08 PROCEDURE — 36000707: Performed by: UROLOGY

## 2023-02-08 PROCEDURE — C1758 CATHETER, URETERAL: HCPCS | Performed by: UROLOGY

## 2023-02-08 PROCEDURE — 52351 CYSTOURETERO & OR PYELOSCOPE: CPT | Mod: ,,, | Performed by: UROLOGY

## 2023-02-08 PROCEDURE — 25000003 PHARM REV CODE 250: Performed by: ANESTHESIOLOGY

## 2023-02-08 RX ORDER — DEXAMETHASONE SODIUM PHOSPHATE 4 MG/ML
INJECTION, SOLUTION INTRA-ARTICULAR; INTRALESIONAL; INTRAMUSCULAR; INTRAVENOUS; SOFT TISSUE
Status: DISCONTINUED | OUTPATIENT
Start: 2023-02-08 | End: 2023-02-08

## 2023-02-08 RX ORDER — PROPOFOL 10 MG/ML
VIAL (ML) INTRAVENOUS
Status: DISCONTINUED | OUTPATIENT
Start: 2023-02-08 | End: 2023-02-08

## 2023-02-08 RX ORDER — ROCURONIUM BROMIDE 10 MG/ML
INJECTION, SOLUTION INTRAVENOUS
Status: DISCONTINUED | OUTPATIENT
Start: 2023-02-08 | End: 2023-02-08

## 2023-02-08 RX ORDER — KETOROLAC TROMETHAMINE 30 MG/ML
INJECTION, SOLUTION INTRAMUSCULAR; INTRAVENOUS
Status: DISCONTINUED | OUTPATIENT
Start: 2023-02-08 | End: 2023-02-08

## 2023-02-08 RX ORDER — HYDROCODONE BITARTRATE AND ACETAMINOPHEN 5; 325 MG/1; MG/1
1 TABLET ORAL EVERY 6 HOURS PRN
Qty: 10 TABLET | Refills: 0 | Status: SHIPPED | OUTPATIENT
Start: 2023-02-08 | End: 2023-02-11

## 2023-02-08 RX ORDER — DIPHENHYDRAMINE HYDROCHLORIDE 50 MG/ML
INJECTION INTRAMUSCULAR; INTRAVENOUS
Status: DISCONTINUED | OUTPATIENT
Start: 2023-02-08 | End: 2023-02-08

## 2023-02-08 RX ORDER — MIDAZOLAM HYDROCHLORIDE 1 MG/ML
INJECTION, SOLUTION INTRAMUSCULAR; INTRAVENOUS
Status: DISCONTINUED | OUTPATIENT
Start: 2023-02-08 | End: 2023-02-08

## 2023-02-08 RX ORDER — DIAZEPAM 5 MG/1
5 TABLET ORAL ONCE
Status: COMPLETED | OUTPATIENT
Start: 2023-02-08 | End: 2023-02-08

## 2023-02-08 RX ORDER — NEOSTIGMINE METHYLSULFATE 0.5 MG/ML
INJECTION, SOLUTION INTRAVENOUS
Status: DISCONTINUED | OUTPATIENT
Start: 2023-02-08 | End: 2023-02-08

## 2023-02-08 RX ORDER — SODIUM CHLORIDE 0.9 % (FLUSH) 0.9 %
2 SYRINGE (ML) INJECTION EVERY 6 HOURS
Status: DISCONTINUED | OUTPATIENT
Start: 2023-02-08 | End: 2023-02-08 | Stop reason: HOSPADM

## 2023-02-08 RX ORDER — CEFAZOLIN SODIUM 1 G/3ML
2 INJECTION, POWDER, FOR SOLUTION INTRAMUSCULAR; INTRAVENOUS ONCE
Status: COMPLETED | OUTPATIENT
Start: 2023-02-08 | End: 2023-02-08

## 2023-02-08 RX ORDER — PROPOFOL 10 MG/ML
VIAL (ML) INTRAVENOUS CONTINUOUS PRN
Status: DISCONTINUED | OUTPATIENT
Start: 2023-02-08 | End: 2023-02-08

## 2023-02-08 RX ORDER — DEXMEDETOMIDINE HYDROCHLORIDE 100 UG/ML
INJECTION, SOLUTION INTRAVENOUS
Status: DISCONTINUED | OUTPATIENT
Start: 2023-02-08 | End: 2023-02-08

## 2023-02-08 RX ORDER — FENTANYL CITRATE 50 UG/ML
INJECTION, SOLUTION INTRAMUSCULAR; INTRAVENOUS
Status: DISCONTINUED | OUTPATIENT
Start: 2023-02-08 | End: 2023-02-08

## 2023-02-08 RX ORDER — SUCCINYLCHOLINE CHLORIDE 20 MG/ML
INJECTION INTRAMUSCULAR; INTRAVENOUS
Status: DISCONTINUED | OUTPATIENT
Start: 2023-02-08 | End: 2023-02-08

## 2023-02-08 RX ORDER — ONDANSETRON 2 MG/ML
INJECTION INTRAMUSCULAR; INTRAVENOUS
Status: DISCONTINUED | OUTPATIENT
Start: 2023-02-08 | End: 2023-02-08

## 2023-02-08 RX ADMIN — CEFAZOLIN 2 G: 330 INJECTION, POWDER, FOR SOLUTION INTRAMUSCULAR; INTRAVENOUS at 03:02

## 2023-02-08 RX ADMIN — NEOSTIGMINE METHYLSULFATE 5 MG: 0.5 INJECTION INTRAVENOUS at 04:02

## 2023-02-08 RX ADMIN — KETOROLAC TROMETHAMINE 30 MG: 30 INJECTION, SOLUTION INTRAMUSCULAR; INTRAVENOUS at 03:02

## 2023-02-08 RX ADMIN — FENTANYL CITRATE 100 MCG: 50 INJECTION, SOLUTION INTRAMUSCULAR; INTRAVENOUS at 03:02

## 2023-02-08 RX ADMIN — DIPHENHYDRAMINE HYDROCHLORIDE 25 MG: 50 INJECTION INTRAMUSCULAR; INTRAVENOUS at 03:02

## 2023-02-08 RX ADMIN — SUCCINYLCHOLINE CHLORIDE 100 MG: 20 INJECTION, SOLUTION INTRAMUSCULAR; INTRAVENOUS at 03:02

## 2023-02-08 RX ADMIN — ONDANSETRON 4 MG: 2 INJECTION, SOLUTION INTRAMUSCULAR; INTRAVENOUS at 03:02

## 2023-02-08 RX ADMIN — GLYCOPYRROLATE 0.4 MG: 0.2 INJECTION INTRAMUSCULAR; INTRAVENOUS at 04:02

## 2023-02-08 RX ADMIN — ROCURONIUM BROMIDE 20 MG: 10 INJECTION INTRAVENOUS at 03:02

## 2023-02-08 RX ADMIN — DEXAMETHASONE SODIUM PHOSPHATE 8 MG: 4 INJECTION INTRA-ARTICULAR; INTRALESIONAL; INTRAMUSCULAR; INTRAVENOUS; SOFT TISSUE at 03:02

## 2023-02-08 RX ADMIN — MIDAZOLAM HYDROCHLORIDE 2 MG: 1 INJECTION, SOLUTION INTRAMUSCULAR; INTRAVENOUS at 03:02

## 2023-02-08 RX ADMIN — DEXMEDETOMIDINE 8 MCG: 100 INJECTION, SOLUTION, CONCENTRATE INTRAVENOUS at 03:02

## 2023-02-08 RX ADMIN — DIAZEPAM 5 MG: 5 TABLET ORAL at 02:02

## 2023-02-08 RX ADMIN — PROPOFOL 200 MG: 10 INJECTION, EMULSION INTRAVENOUS at 03:02

## 2023-02-08 RX ADMIN — SODIUM CHLORIDE: 0.9 INJECTION, SOLUTION INTRAVENOUS at 03:02

## 2023-02-08 NOTE — DISCHARGE INSTRUCTIONS
Post Cystoscopy Instructions  Do not strain to have a bowel movement  No strenuous exercise x 7 days  No driving while you are on narcotic pain medications or if your rondon  catheter is in place     You can expect:  To pass stone fragments if you had a stone procedure  Have pain when you void from your stent if you have a stent in place  See blood in your urine if you have a stent in place     If you have a catheter, please return to the ER if your catheter stops draining or you are having abdominal pain.     Call the doctor if:  Temperature is greater than 101F  Persistent vomiting and inability to keep food down  Inability to void if you do not have a catheter      What to Expect After a Cystoscopy  For the next 24-48 hours, you may feel a mild burning when you urinate. This burning is normal and expected. Drink plenty of water to dilute the urine to help relieve the burning sensation. You may also see a small amount of blood in your urine after the procedure. Do not strain to have a bowel movement. No strenuous exercise x 7 days. No driving while you are on narcotic pain medications or if your rondon catheter is in place.     You can expect:  To pass stone fragments if you had a stone procedure  Have pain when you void from your stent if you have a stent in place  See blood in your urine if you have a stent in place     Unless you are already taking antibiotics, you may be given an antibiotic after the test to prevent infection.     Signs and Symptoms to Report  Call the [facility] at [phone] if you develop any of the following:  Fever of 101 degrees or higher  Chills or persistent bleeding  Inability to urinate        If you have a catheter, please return to the ER if your catheter stops draining or you are having abdominal pain.

## 2023-02-08 NOTE — ANESTHESIA PROCEDURE NOTES
Intubation    Date/Time: 2/8/2023 3:21 PM  Performed by: Binta Dillon CRNA  Authorized by: Gulshan Ly DO     Intubation:     Induction:  Intravenous    Intubated:  Postinduction    Mask Ventilation:  Easy mask    Attempts:  1    Attempted By:  CRNA    Method of Intubation:  Video laryngoscopy    Laryngeal View Grade: Grade I - full view of cords      Difficult Airway Encountered?: No      Complications:  None    Airway Device:  Oral endotracheal tube    Airway Device Size:  7.5    Style/Cuff Inflation:  Cuffed    Tube secured:  21    Secured at:  The lips    Placement Verified By:  Capnometry    Complicating Factors:  None    Findings Post-Intubation:  BS equal bilateral

## 2023-02-08 NOTE — DISCHARGE SUMMARY
[facility]  Discharge Note  Short Stay    Procedure(s) (LRB):  CYSTOSCOPY, WITH CALCULUS REMOVAL (Right)  URETEROSCOPY (Right)      OUTCOME: Patient tolerated treatment/procedure well without complication and is now ready for discharge.    DISPOSITION: Home or Self Care    FINAL DIAGNOSIS:  Right ureteral stricture/obstruction    FOLLOWUP:  Patient will contact Dr. Prado for discussion; not urgent    DISCHARGE INSTRUCTIONS:  Return if nausea, vomiting, fever or chills    TIME SPENT ON DISCHARGE: 15 minutes

## 2023-02-08 NOTE — PLAN OF CARE
Discharge instructions given and explained to patient and family with verbalization of understanding all instructions.  Patients v/s stable, denies n/v and tolerating po, rates pain level tolerable, IV removed, and family at bedside for patient discharge home.

## 2023-02-08 NOTE — TRANSFER OF CARE
Anesthesia Transfer of Care Note    Patient: Fernie Rice    Procedure(s) Performed: Procedure(s) (LRB):  CYSTOSCOPY, WITH CALCULUS REMOVAL (Right)  URETEROSCOPY (Right)    Patient location: PACU    Anesthesia Type: general    Transport from OR: Transported from OR on 6-10 L/min O2 by face mask with adequate spontaneous ventilation    Post pain: adequate analgesia    Post assessment: no apparent anesthetic complications    Post vital signs: stable    Level of consciousness: awake    Nausea/Vomiting: no nausea/vomiting    Complications: none    Transfer of care protocol was followed      Last vitals:   Visit Vitals  BP (!) 148/86   Pulse 78   Temp 36.8 °C (98.2 °F) (Tympanic)   Resp 18   SpO2 96%

## 2023-02-08 NOTE — OP NOTE
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 He presented to the ED with finding of:     Right kidney cortical atrophy at the upper pole with hypoattenuation at this location which may be due to chronic changes related to previous chronic obstruction of upper pole moiety of this duplicated right collecting system.  Multiple nonobstructing calcifications are seen which appears similar compared to 02/02/2020 exam.    Mild dilatation of the upper pole ureter of the duplicated right collecting system which appears improved compared to prior. Persistent ureteral wall thickening which appears chronic.    Scheduled for right retrograde, ureteroscopy, possible USE given history or right flank pain.     Procedure in detail:  Informed consent was obtained by explaining all risks and benefits of the procedure to the patient in detail.  After informed consent, the patient was brought to the OR where General Endotracheal anesthesia  was administered by the anesthesia staff. Appropriate perioperative antibiotics were given within 30 minutes of beginning the procedure. A formal timeout was performed prior to the procedure. After induction, the patient was gently placed in lithotomy position with all pressure points padded. Bilateral sequential compression devices were applied and activated prior to  induction. The patient was prepped and draped in standard fashion.    A 20 Turkish rigid cystoscope was passed per urethra into the bladder. Inspection of the bladder demonstrated no primary bladder pathology and the suspected urolithiasis was not seen to have passed into the bladder. The right reimplant ureteral orifice was cannulated with a 5 Turkish open-ended ureteral catheter and a retrograde pyelogram was performed to demonstrate any hydroureteronephrosis from obstruction or filling defects from urolithiasis as well as to define the ureter. The proximal ureter appeared to end blindly at what would be expected to be the right UPJ.     A flexible ureteroscope was easily advanced into the reimplanted ureter and advanced to a blind end consistent with that seen on retrograde. No clear passage was seen to the renal pelvis.     He tolerated the procedure well and was extubated and transferred in stable condition to the recovery room.     It is unclear if the right upper pole moiety is contributing to his right flank pain. He presented to the ED with severe right pain that lasted a few days. This was about a month ago and has not recurred. His mother reports he may have some residual discomfort but these are not the same symptoms that brought him to the ED. They are going to return to discuss with Dr. Prado whether there is any indication for josefa-nephrectomy of the upper pole unit given this presentation.

## 2023-02-08 NOTE — ANESTHESIA POSTPROCEDURE EVALUATION
Anesthesia Post Evaluation    Patient: Fernie Rice    Procedure(s) Performed: Procedure(s) (LRB):  CYSTOSCOPY, WITH CALCULUS REMOVAL (Right)  URETEROSCOPY (Right)          Patient location during evaluation: PACU  Patient participation: Yes- Able to Participate  Level of consciousness: awake and alert  Post-procedure vital signs: reviewed and stable  Pain management: adequate  Airway patency: patent    PONV status at discharge: No PONV  Anesthetic complications: no      Respiratory status: spontaneous ventilation  Hydration status: euvolemic  Follow-up needed           Vitals Value Taken Time   /72 02/08/23 1647   Temp 36.8 °C (98.2 °F) 02/08/23 1630   Pulse 72 02/08/23 1648   Resp 18 02/08/23 1648   SpO2 97 % 02/08/23 1648   Vitals shown include unvalidated device data.      No case tracking events are documented in the log.      Pain/Mayur Score: Mayur Score: 8 (2/8/2023  4:41 PM)  Modified Mayur Score: 18 (2/8/2023  4:41 PM)

## 2023-02-08 NOTE — TELEPHONE ENCOUNTER
2/8/2023      Peter Parry M.D.  [facility]  [facility] 4th Floor  [address]  [place] [address]  Phone: [phone]                 Patient: Fernie Rice   YOB: 2000   Date of Visit: 2/8/2023       Dear Sir or Madam:    Please excuse Fernie Rice as she was under my care today. He may return to school without restrictions 2/15/2023.     If you have any questions, please do not hesitate to contact me.     Sincerely,        Peter Parry M.D.

## 2023-02-08 NOTE — ANESTHESIA POSTPROCEDURE EVALUATION
Anesthesia Post Evaluation    Patient: Fernie Rice    Procedure(s) Performed: Procedure(s) (LRB):  CYSTOSCOPY, WITH CALCULUS REMOVAL (Right)  URETEROSCOPY (Right)    Final Anesthesia Type: general      Patient location during evaluation: PACU                    Vitals Value Taken Time   /72 02/08/23 1647   Temp 36.8 °C (98.2 °F) 02/08/23 1630   Pulse 72 02/08/23 1648   Resp 18 02/08/23 1648   SpO2 97 % 02/08/23 1648   Vitals shown include unvalidated device data.      No case tracking events are documented in the log.      Pain/Mayur Score: Mayur Score: 8 (2/8/2023  4:41 PM)  Modified Mayur Score: 18 (2/8/2023  4:41 PM)

## 2023-02-26 NOTE — SUBJECTIVE & OBJECTIVE
[address]A [id number] KAELA Barbosa.  pt /94 and recheck was 180/97. HR is 79.   Elly POWELL [phone]    MD ordered one time dose of Labetalol 10mg IV   Past Medical History:   Diagnosis Date    ADHD (attention deficit hyperactivity disorder)     Asthma     Eczema     Esophagitis     Food impaction of esophagus     Multiple food allergies     Vernal keratoconjunctivitis        Past Surgical History:   Procedure Laterality Date    ADENOIDECTOMY      CLOSED REDUCTION OF INJURY OF HAND WITH PERCUTANEOUS PINNING Right 1/2/2020    Procedure: CLOSED REDUCTION, HAND, WITH PERCUTANEOUS PINNING;  Surgeon: Chace Jerez MD;  Location: [facility];  Service: Orthopedics;  Laterality: Right;    EYE SURGERY      ulcers removed    SINUS SURGERY         Review of patient's allergies indicates:   Allergen Reactions    Fish containing products Other (See Comments)    Peanut Anaphylaxis    Amoxicillin Hives     Tolerates ceftriaxone and cefepime    Bactrim [sulfamethoxazole-trimethoprim] Hives    Corn containing products Other (See Comments)    Soy fiber     Soy flour Dermatitis       No current facility-administered medications on file prior to encounter.      Current Outpatient Medications on File Prior to Encounter   Medication Sig    HYDROcodone-acetaminophen (NORCO) 5-325 mg per tablet Take 1 tablet by mouth every 6 (six) hours as needed for Pain.    acyclovir (ZOVIRAX) 200 mg/5 mL suspension Take 20 mLs (800 mg total) by mouth 3 (three) times daily. for 7 days (Patient taking differently: Take 800 mg by mouth 3 (three) times daily as needed. )    albuterol-ipratropium (DUO-NEB) 2.5 mg-0.5 mg/3 mL nebulizer solution Take 3 mLs by nebulization every 4 (four) hours as needed for Wheezing or Shortness of Breath. Rescue    gemifloxacin (FACTIVE) 320 mg tablet Take one po now    ondansetron (ZOFRAN-ODT) 4 MG TbDL Dissolve 1 tablet (4 mg total) by mouth every 8 (eight) hours as needed. (Patient not taking: Reported on 11/4/2019)    ondansetron (ZOFRAN-ODT) 4 MG TbDL Take 2 tablets (8 mg total) by mouth every 8 (eight) hours as needed.    PROAIR HFA 90  mcg/actuation inhaler Inhale 1-2 puffs into the lungs every 6 (six) hours as needed for Wheezing or Shortness of Breath. Rescue     Family History     None        Tobacco Use    Smoking status: Current Some Day Smoker    Smokeless tobacco: Never Used   Substance and Sexual Activity    Alcohol use: Yes     Alcohol/week: 20.0 standard drinks     Types: 20 Cans of beer per week    Drug use: Yes     Types: Marijuana    Sexual activity: Yes     Partners: Female     Birth control/protection: Condom     Comment: quit 2 weeks ago.     Review of Systems   Constitutional: Positive for chills and fever.   HENT: Negative for hearing loss, sinus pain, sneezing and sore throat.    Eyes: Negative for photophobia and visual disturbance.   Respiratory: Positive for shortness of breath. Negative for cough, chest tightness and wheezing.    Cardiovascular: Positive for palpitations. Negative for chest pain and leg swelling.   Gastrointestinal: Positive for abdominal pain (right sided). Negative for constipation, diarrhea, nausea and vomiting.   Endocrine: Negative for polydipsia, polyphagia and polyuria.   Genitourinary: Positive for dysuria. Negative for hematuria and urgency.   Musculoskeletal: Negative for arthralgias and back pain.   Skin: Negative for rash.   Allergic/Immunologic: Negative for immunocompromised state.   Neurological: Negative for dizziness and headaches.   Hematological: Negative for adenopathy.   Psychiatric/Behavioral: Negative for agitation and behavioral problems.     Objective:     Vital Signs (Most Recent):  Temp: (!) 101.1 °F (38.4 °C) (02/03/20 0026)  Pulse: (!) 127 (02/03/20 0026)  Resp: 16 (02/02/20 2331)  BP: 132/75 (02/03/20 0026)  SpO2: (!) 94 % (02/02/20 2331) Vital Signs (24h Range):  Temp:  [98.7 °F (37.1 °C)-103 °F (39.4 °C)] 101.1 °F (38.4 °C)  Pulse:  [] 127  Resp:  [16-26] 16  SpO2:  [94 %-100 %] 94 %  BP: (107-148)/(53-90) 132/75     Weight: 72.6 kg (160 lb)  Body mass index is  27.46 kg/m².    Physical Exam   Constitutional: He is oriented to person, place, and time. He appears well-developed and well-nourished. He appears distressed (due to pain and chills).   HENT:   Head: Atraumatic.   Eyes: Right eye exhibits no discharge. Left eye exhibits no discharge. No scleral icterus.   Neck: Neck supple. No JVD present.   Cardiovascular: Regular rhythm. Exam reveals no gallop and no friction rub.   No murmur heard.  Tachycardic   Pulmonary/Chest: Effort normal and breath sounds normal. No respiratory distress. He has no wheezes. He has no rales.   Abdominal: Soft. He exhibits no distension. There is tenderness (right sided tenderness. neph tube site on right flank clean dry and intact).   Musculoskeletal: He exhibits no edema or tenderness.   Neurological: He is alert and oriented to person, place, and time. No cranial nerve deficit or sensory deficit.   Skin: Skin is dry.   Psychiatric: He has a normal mood and affect.           Significant Labs:   Blood Culture:   Recent Labs   Lab 02/02/20  1428   LABBLOO No Growth to date  No Growth to date     CBC:   Recent Labs   Lab 02/02/20  1428   WBC 14.55*   HGB 14.3   HCT 43.1   *     CMP:   Recent Labs   Lab 02/02/20  1428      K 4.0   CL 99   CO2 25   *   BUN 12   CREATININE 1.0   CALCIUM 9.9   PROT 8.3   ALBUMIN 3.5   BILITOT 1.6*   ALKPHOS 103   AST 14   ALT 21   ANIONGAP 13   EGFRNONAA >60.0     Lactic Acid:   Recent Labs   Lab 02/02/20  1428 02/02/20  1840   LACTATE 2.7* 0.9     Troponin:   Recent Labs   Lab 02/02/20  1428   TROPONINI <0.006     Urine Culture: No results for input(s): LABURIN in the last 48 hours.  Urine Studies:   Recent Labs   Lab 02/02/20  1442   COLORU Franci   APPEARANCEUA Cloudy*   PHUR 6.0   SPECGRAV 1.015   PROTEINUA 2+*   GLUCUA Negative   KETONESU Negative   BILIRUBINUA Negative   OCCULTUA 2+*   NITRITE Positive*   LEUKOCYTESUR 3+*   RBCUA 5*   WBCUA >100*   BACTERIA Many*   HYALINECASTS 16*        Significant Imaging: I have reviewed all pertinent imaging results/findings within the past 24 hours.

## 2024-02-01 ENCOUNTER — TELEPHONE (OUTPATIENT)
Dept: DERMATOLOGY | Facility: CLINIC | Age: 24
End: 2024-02-01
Payer: COMMERCIAL

## 2024-02-01 NOTE — TELEPHONE ENCOUNTER
Appt made per pt request----- Message from Gregoria Rangel MA sent at 2/1/2024  3:19 PM CST -----  Regarding: FW: Appt  Contact: Pt [phone], [phone]  Pt would like to establish care with Dr. Andrews if possible.   Thanks!    ----- Message -----  From: Gregoria Rangel MA  Sent: 1/31/2024   5:22 PM CST  To: Gregoria Rangel MA  Subject: FW: Appt                                           ----- Message -----  From: Josafat Macedo  Sent: 1/31/2024   1:08 PM CST  To: [facility] Derm Clinical Support Triage  Subject: Appt                                             Pt calling to reestablish care for Eczema(Dupixent). Pt will be out of medication on Friday. Attempted to schedule. Please call [phone], [phone]

## 2024-02-08 ENCOUNTER — HOSPITAL ENCOUNTER (EMERGENCY)
Facility: HOSPITAL | Age: 24
Discharge: HOME | End: 2024-02-09
Attending: EMERGENCY MEDICINE
Payer: COMMERCIAL

## 2024-02-08 DIAGNOSIS — M25.512 LEFT SHOULDER PAIN: ICD-10-CM
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
## 2024-02-08 DIAGNOSIS — R29.898 WEAKNESS OF BOTH LOWER EXTREMITIES: ICD-10-CM

## 2024-02-08 DIAGNOSIS — R51.9 ACUTE NONINTRACTABLE HEADACHE, UNSPECIFIED HEADACHE TYPE: ICD-10-CM

## 2024-02-08 DIAGNOSIS — M54.2 NECK PAIN: ICD-10-CM

## 2024-02-08 DIAGNOSIS — M54.9 BACK PAIN, UNSPECIFIED BACK LOCATION, UNSPECIFIED BACK PAIN LATERALITY, UNSPECIFIED CHRONICITY: ICD-10-CM

## 2024-02-08 PROCEDURE — 63600175 PHARM REV CODE 636 W HCPCS: Performed by: EMERGENCY MEDICINE

## 2024-02-08 PROCEDURE — 99285 EMERGENCY DEPT VISIT HI MDM: CPT | Mod: 25

## 2024-02-08 PROCEDURE — 96374 THER/PROPH/DIAG INJ IV PUSH: CPT

## 2024-02-08 PROCEDURE — 96376 TX/PRO/DX INJ SAME DRUG ADON: CPT

## 2024-02-08 PROCEDURE — 63600175 PHARM REV CODE 636 W HCPCS

## 2024-02-08 PROCEDURE — 96375 TX/PRO/DX INJ NEW DRUG ADDON: CPT

## 2024-02-08 RX ORDER — MORPHINE SULFATE 4 MG/ML
4 INJECTION, SOLUTION INTRAMUSCULAR; INTRAVENOUS
Status: COMPLETED | OUTPATIENT
Start: 2024-02-08 | End: 2024-02-08

## 2024-02-08 RX ORDER — HYDROMORPHONE HYDROCHLORIDE 1 MG/ML
1 INJECTION, SOLUTION INTRAMUSCULAR; INTRAVENOUS; SUBCUTANEOUS
Status: COMPLETED | OUTPATIENT
Start: 2024-02-08 | End: 2024-02-08

## 2024-02-08 RX ORDER — HYDROMORPHONE HYDROCHLORIDE 1 MG/ML
2 INJECTION, SOLUTION INTRAMUSCULAR; INTRAVENOUS; SUBCUTANEOUS
Status: DISCONTINUED | OUTPATIENT
Start: 2024-02-08 | End: 2024-02-09 | Stop reason: HOSPADM

## 2024-02-08 RX ORDER — HYDROMORPHONE HYDROCHLORIDE 1 MG/ML
0.5 INJECTION, SOLUTION INTRAMUSCULAR; INTRAVENOUS; SUBCUTANEOUS
Status: COMPLETED | OUTPATIENT
Start: 2024-02-08 | End: 2024-02-08

## 2024-02-08 RX ADMIN — HYDROMORPHONE HYDROCHLORIDE 1 MG: 1 INJECTION, SOLUTION INTRAMUSCULAR; INTRAVENOUS; SUBCUTANEOUS at 11:02

## 2024-02-08 RX ADMIN — MORPHINE SULFATE 4 MG: 4 INJECTION INTRAVENOUS at 08:02

## 2024-02-08 RX ADMIN — HYDROMORPHONE HYDROCHLORIDE 0.5 MG: 1 INJECTION, SOLUTION INTRAMUSCULAR; INTRAVENOUS; SUBCUTANEOUS at 09:02

## 2024-02-08 NOTE — Clinical Note
"Fernie Rice (Jack) was seen and treated in our emergency department on 2/8/2024.  He may return to school on 02/12/2024.      If you have any questions or concerns, please don't hesitate to call.      Rylee J RN"

## 2024-02-08 NOTE — Clinical Note
"Fernie Rice (Jack) was seen and treated in our emergency department on 2/8/2024.  He may return to work on 02/12/2024.       If you have any questions or concerns, please don't hesitate to call.      Rylee J RN    "

## 2024-02-09 VITALS
TEMPERATURE: 98 F | RESPIRATION RATE: 15 BRPM | OXYGEN SATURATION: 95 % | SYSTOLIC BLOOD PRESSURE: 138 MMHG | BODY MASS INDEX: 22.89 KG/M2 | DIASTOLIC BLOOD PRESSURE: 83 MMHG | HEART RATE: 98 BPM | WEIGHT: 155 LBS

## 2024-02-09 PROCEDURE — 25000003 PHARM REV CODE 250: Performed by: STUDENT IN AN ORGANIZED HEALTH CARE EDUCATION/TRAINING PROGRAM

## 2024-02-09 RX ORDER — NAPROXEN 500 MG/1
500 TABLET ORAL 2 TIMES DAILY WITH MEALS
Qty: 20 TABLET | Refills: 0 | Status: SHIPPED | OUTPATIENT
Start: 2024-02-09 | End: 2024-02-19

## 2024-02-09 RX ORDER — METHOCARBAMOL 500 MG/1
500 TABLET, FILM COATED ORAL 3 TIMES DAILY
Qty: 30 TABLET | Refills: 0 | Status: SHIPPED | OUTPATIENT
Start: 2024-02-09 | End: 2024-02-19

## 2024-02-09 RX ORDER — DIPHENHYDRAMINE HCL 25 MG
25 CAPSULE ORAL
Status: COMPLETED | OUTPATIENT
Start: 2024-02-09 | End: 2024-02-09

## 2024-02-09 RX ADMIN — DIPHENHYDRAMINE HYDROCHLORIDE 25 MG: 25 CAPSULE ORAL at 02:02
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
## 2024-02-09 NOTE — DISCHARGE INSTRUCTIONS
Home Care Instructions:  - Medications: Take Naproxen twice daily for pain. Do NOT take Ibuprofen while taking Naproxen. Take Robaxin (muscle relaxant) 3 times daily.   - You may also take 500mg Tylenol every 6 hours.     Follow-Up Plan:  - Follow-up with: Primary care doctor  - Additional testing and/or evaluation will be directed by your primary doctor    Return to the Emergency Department for symptoms including but not limited to: worsening symptoms, severe back pain, shortness of breath or chest pain, vomiting with inability to hold down fluids, blood in vomit or poop, fevers greater than 100.4°F, passing out/fainting/unconsciousness, or other concerning symptoms.

## 2024-07-17 ENCOUNTER — OFFICE VISIT (OUTPATIENT)
Dept: OPHTHALMOLOGY | Facility: CLINIC | Age: 24
End: 2024-07-17
Payer: COMMERCIAL

## 2024-07-17 DIAGNOSIS — H16.261: Primary | ICD-10-CM

## 2024-07-17 DIAGNOSIS — H17.9 CORNEAL SCAR WITH OPACITY: ICD-10-CM

## 2024-07-17 PROCEDURE — 1160F RVW MEDS BY RX/DR IN RCRD: CPT | Mod: CPTII,S$GLB,, | Performed by: OPHTHALMOLOGY

## 2024-07-17 PROCEDURE — 92004 COMPRE OPH EXAM NEW PT 1/>: CPT | Mod: S$GLB,,, | Performed by: OPHTHALMOLOGY

## 2024-07-17 PROCEDURE — 1159F MED LIST DOCD IN RCRD: CPT | Mod: CPTII,S$GLB,, | Performed by: OPHTHALMOLOGY

## 2024-07-17 PROCEDURE — 99999 PR PBB SHADOW E&M-EST. PATIENT-LVL III: CPT | Mod: PBBFAC,,, | Performed by: OPHTHALMOLOGY

## 2024-07-17 NOTE — PROGRESS NOTES
HPI     pt [facility] since 2019              Comments: Hx of HSV   DERMATITIS  OD   Last visit with DR Pacheco over a yr  ago       Pt  discussed with DR White  keratocus in OD x few yrs ago           Comments    DLS:08/28/2019    Patient here for 1 week follow up for Dermatitis right eyelid.  Pt states OD feels fine. Finished Acyclovir.  No eye pain.    I have personally interviewed the patient, reviewed the history and   examined the patient and agree with the technician's exam.          Last edited by Dimple Fischer on 7/17/2024  2:54 PM.            Assessment /Plan     For exam results, see Encounter Report.    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

## 2024-07-18 ENCOUNTER — PATIENT MESSAGE (OUTPATIENT)
Dept: OPTOMETRY | Facility: CLINIC | Age: 24
End: 2024-07-18
Payer: COMMERCIAL

## (undated) DEVICE — SLING ARM SIZE MEDIUM

## (undated) DEVICE — BANDAGE PLASTER FAST 3 X 3YD

## (undated) DEVICE — SCISSOR 5MMX35CM DIRECT DRIVE

## (undated) DEVICE — UNDERGLOVES BIOGEL PI SZ 7 LF

## (undated) DEVICE — PAD GROUNDING CONMED ADULT

## (undated) DEVICE — SYR 50ML CATH TIP

## (undated) DEVICE — SEAL UNIVERSAL 5MM-8MM XI

## (undated) DEVICE — SYR 10CC LUER LOCK

## (undated) DEVICE — SUT MONOCRYL 3-0 RB1

## (undated) DEVICE — TROCAR ENDOPATH XCEL 12X100MM

## (undated) DEVICE — SEE MEDLINE ITEM 152529

## (undated) DEVICE — TRAY CYSTO BASIN

## (undated) DEVICE — NDL MAYO CAT 1/2 CIR #6

## (undated) DEVICE — KIT VUETIP TROCAR SWAB

## (undated) DEVICE — TRAY SKIN SCRUB WET PREMIUM

## (undated) DEVICE — GLOVE BIOGEL SKINSENSE PI 7.0

## (undated) DEVICE — SEE MEDLINE ITEM 157131

## (undated) DEVICE — SEE MEDLINE ITEM 146308

## (undated) DEVICE — SOL ELECTROLUBE ANTI-STIC

## (undated) DEVICE — CATH POLLACK OPEN-END FLEXI-TI

## (undated) DEVICE — IRRIGATOR ENDOSCOPY DISP.

## (undated) DEVICE — SOL 9P NACL IRR PIC IL

## (undated) DEVICE — DRAPE STERI U-SHAPED 47X51IN

## (undated) DEVICE — BLADE CLIPPER SENICLIP SURGICA

## (undated) DEVICE — SUT PROLENE 3-0 FS-1 MONO18

## (undated) DEVICE — GOWN X-LG STERILE BACK

## (undated) DEVICE — SPONGE IV DRAIN 4X4 STERILE

## (undated) DEVICE — BAG URO DRAIN

## (undated) DEVICE — DRAPE PLASTIC U 60X72

## (undated) DEVICE — GLOVE BIOGEL SKINSENSE PI 7.5

## (undated) DEVICE — DRESSING XEROFORM FOIL PK 1X8

## (undated) DEVICE — SET IRR URLGY 2LINE UNIV SPIKE

## (undated) DEVICE — DRAPE SCOPE PILLOW WARMER

## (undated) DEVICE — PORT AIRSEAL 12/120MM LPI

## (undated) DEVICE — APPLICATOR CHLORAPREP ORN 26ML

## (undated) DEVICE — SYR 30CC LUER LOCK

## (undated) DEVICE — DRAPE ARM DAVINCI XI

## (undated) DEVICE — GUIDE WIRE MOTION .035 X 150CM

## (undated) DEVICE — PAD CAST SPECIALIST STRL 3

## (undated) DEVICE — NDL 18GA X1 1/2 REG BEVEL

## (undated) DEVICE — ALCOHOL 70% ISOP W/GREEN 16OZ

## (undated) DEVICE — DRESSING XEROFORM 1X8IN

## (undated) DEVICE — DRAPE STERI INSTRUMENT 1018

## (undated) DEVICE — ELECTRODE REM PLYHSV RETURN 9

## (undated) DEVICE — LEGGINGS 48X31 INCH

## (undated) DEVICE — GLOVE BIOGEL 7.5

## (undated) DEVICE — JELLY SURGILUBE LUBE TUBE 2OZ

## (undated) DEVICE — TRAY MINOR GEN SURG

## (undated) DEVICE — PAD CAST SPECIALIST STRL 4

## (undated) DEVICE — CLOSURE SKIN STERI STRIP 1/2X4

## (undated) DEVICE — GLOVE BIOGEL SKINSENSE PI 8.0

## (undated) DEVICE — Device

## (undated) DEVICE — CLIPPER BLADE MOD 4406 (CAREF)

## (undated) DEVICE — SOL NS 1000CC

## (undated) DEVICE — SEE MEDLINE ITEM 157166

## (undated) DEVICE — ELECTRODE BLD 1 INCH TEFLON

## (undated) DEVICE — TUBING SUC UNIV W/CONN 12FT

## (undated) DEVICE — TOURNIQUET SB QC DP 18X4IN

## (undated) DEVICE — LUBRICANT SURGILUBE 2 OZ

## (undated) DEVICE — CATH URETH FOLEY 2W 16FR 5ML

## (undated) DEVICE — SET TRI-LUMEN FILTERED TUBE

## (undated) DEVICE — DRAPE ABDOMINAL TIBURON 14X11

## (undated) DEVICE — KIT ANTIFOG

## (undated) DEVICE — CATH URET OPEN END 4.8X8F 70CM

## (undated) DEVICE — SET CYSTO IRRIGATION UNIV SPIK

## (undated) DEVICE — SYR ONLY LUER LOCK 20CC

## (undated) DEVICE — SUT MONOCYRL 4-0 PS2 UND

## (undated) DEVICE — COVER TIP CURVED SCISSORS XI

## (undated) DEVICE — PACK PERI/GYN OPTIMA

## (undated) DEVICE — GOWN SMARTGOWN LVL4 X-LONG XL

## (undated) DEVICE — CLIP HEMO-LOK MLX LARGE LF

## (undated) DEVICE — GOWN IMPERVIOUS UNIVERSAL SZ

## (undated) DEVICE — SOL WATER STRL IRR 1000ML

## (undated) DEVICE — PACK UPPER EXTREMITY BAPTIST

## (undated) DEVICE — DRAPE INCISE IOBAN 2 23X17IN

## (undated) DEVICE — SUT MONOCRYL 3-0 PS-2 UND

## (undated) DEVICE — SET Y-TYPE TUR IRRIGATION

## (undated) DEVICE — ADHESIVE DERMABOND ADVANCED

## (undated) DEVICE — TROCAR ENDOPATH XCEL 5MM 7.5CM

## (undated) DEVICE — GOWN SURGICAL X-LARGE

## (undated) DEVICE — SUT MCRYL PLUS 4-0 PS2 27IN

## (undated) DEVICE — PACK CYSTO

## (undated) DEVICE — SOL IRR NACL .9% 3000ML

## (undated) DEVICE — COVER LIGHT HANDLE

## (undated) DEVICE — NDL INSUF ULTRA VERESS 120MM

## (undated) DEVICE — BLADE SURG CARBON STEEL SZ11

## (undated) DEVICE — SOL IRR WATER STRL 3000 ML

## (undated) DEVICE — TOWEL OR DISP STRL BLUE 4/PK

## (undated) DEVICE — SEE MEDLINE ITEM 146298